# Patient Record
Sex: FEMALE | Employment: OTHER | URBAN - METROPOLITAN AREA
[De-identification: names, ages, dates, MRNs, and addresses within clinical notes are randomized per-mention and may not be internally consistent; named-entity substitution may affect disease eponyms.]

---

## 2017-02-04 ENCOUNTER — OFFICE VISIT (OUTPATIENT)
Dept: INTERNAL MEDICINE CLINIC | Age: 52
End: 2017-02-04

## 2017-02-04 VITALS
HEART RATE: 82 BPM | OXYGEN SATURATION: 98 % | WEIGHT: 259 LBS | SYSTOLIC BLOOD PRESSURE: 118 MMHG | DIASTOLIC BLOOD PRESSURE: 98 MMHG | HEIGHT: 67 IN | BODY MASS INDEX: 40.65 KG/M2

## 2017-02-04 DIAGNOSIS — M25.50 ARTHRALGIA, UNSPECIFIED JOINT: ICD-10-CM

## 2017-02-04 DIAGNOSIS — Z77.011 LEAD EXPOSURE: ICD-10-CM

## 2017-02-04 DIAGNOSIS — Z13.9 SCREENING: ICD-10-CM

## 2017-02-04 DIAGNOSIS — E78.00 PURE HYPERCHOLESTEROLEMIA: ICD-10-CM

## 2017-02-04 LAB
A/G RATIO: 1.5 (ref 1.1–2.2)
ALBUMIN SERPL-MCNC: 4.3 G/DL (ref 3.4–5)
ALP BLD-CCNC: 67 U/L (ref 40–129)
ALT SERPL-CCNC: 15 U/L (ref 10–40)
ANION GAP SERPL CALCULATED.3IONS-SCNC: 11 MMOL/L (ref 3–16)
AST SERPL-CCNC: 11 U/L (ref 15–37)
BILIRUB SERPL-MCNC: 0.3 MG/DL (ref 0–1)
BILIRUBIN URINE: NEGATIVE
BLOOD, URINE: ABNORMAL
BUN BLDV-MCNC: 10 MG/DL (ref 7–20)
CALCIUM SERPL-MCNC: 10 MG/DL (ref 8.3–10.6)
CHLORIDE BLD-SCNC: 101 MMOL/L (ref 99–110)
CHOLESTEROL, TOTAL: 221 MG/DL (ref 0–199)
CLARITY: CLEAR
CO2: 28 MMOL/L (ref 21–32)
COLOR: YELLOW
CREAT SERPL-MCNC: 0.7 MG/DL (ref 0.6–1.1)
CREATININE URINE: 191.4 MG/DL (ref 28–259)
EPITHELIAL CELLS, UA: 0 /HPF (ref 0–5)
GFR AFRICAN AMERICAN: >60
GFR NON-AFRICAN AMERICAN: >60
GLOBULIN: 2.8 G/DL
GLUCOSE BLD-MCNC: 93 MG/DL (ref 70–99)
GLUCOSE URINE: NEGATIVE MG/DL
HCT VFR BLD CALC: 43.6 % (ref 36–48)
HDLC SERPL-MCNC: 54 MG/DL (ref 40–60)
HEMOGLOBIN: 14.8 G/DL (ref 12–16)
HYALINE CASTS: 1 /HPF (ref 0–8)
KETONES, URINE: NEGATIVE MG/DL
LDL CHOLESTEROL CALCULATED: 153 MG/DL
LEUKOCYTE ESTERASE, URINE: NEGATIVE
MCH RBC QN AUTO: 31.7 PG (ref 26–34)
MCHC RBC AUTO-ENTMCNC: 33.9 G/DL (ref 31–36)
MCV RBC AUTO: 93.6 FL (ref 80–100)
MICROALBUMIN UR-MCNC: <1.2 MG/DL
MICROALBUMIN/CREAT UR-RTO: NORMAL MG/G (ref 0–30)
MICROSCOPIC EXAMINATION: YES
NITRITE, URINE: NEGATIVE
PDW BLD-RTO: 12.9 % (ref 12.4–15.4)
PH UA: 6.5
PLATELET # BLD: 193 K/UL (ref 135–450)
PMV BLD AUTO: 9.6 FL (ref 5–10.5)
POTASSIUM SERPL-SCNC: 4.2 MMOL/L (ref 3.5–5.1)
PROTEIN UA: NEGATIVE MG/DL
RBC # BLD: 4.66 M/UL (ref 4–5.2)
RBC UA: 44 /HPF (ref 0–4)
SODIUM BLD-SCNC: 140 MMOL/L (ref 136–145)
SPECIFIC GRAVITY UA: 1.02
TOTAL PROTEIN: 7.1 G/DL (ref 6.4–8.2)
TRIGL SERPL-MCNC: 68 MG/DL (ref 0–150)
TSH REFLEX: 1.4 UIU/ML (ref 0.27–4.2)
URINE TYPE: ABNORMAL
UROBILINOGEN, URINE: 0.2 E.U./DL
VLDLC SERPL CALC-MCNC: 14 MG/DL
WBC # BLD: 3 K/UL (ref 4–11)
WBC UA: 1 /HPF (ref 0–5)

## 2017-02-04 PROCEDURE — G8419 CALC BMI OUT NRM PARAM NOF/U: HCPCS | Performed by: INTERNAL MEDICINE

## 2017-02-04 PROCEDURE — G8484 FLU IMMUNIZE NO ADMIN: HCPCS | Performed by: INTERNAL MEDICINE

## 2017-02-04 PROCEDURE — 99214 OFFICE O/P EST MOD 30 MIN: CPT | Performed by: INTERNAL MEDICINE

## 2017-02-04 PROCEDURE — 3014F SCREEN MAMMO DOC REV: CPT | Performed by: INTERNAL MEDICINE

## 2017-02-04 PROCEDURE — G8427 DOCREV CUR MEDS BY ELIG CLIN: HCPCS | Performed by: INTERNAL MEDICINE

## 2017-02-04 PROCEDURE — 1036F TOBACCO NON-USER: CPT | Performed by: INTERNAL MEDICINE

## 2017-02-12 ASSESSMENT — ENCOUNTER SYMPTOMS
RESPIRATORY NEGATIVE: 1
GASTROINTESTINAL NEGATIVE: 1
EYES NEGATIVE: 1

## 2017-05-10 ENCOUNTER — TELEPHONE (OUTPATIENT)
Dept: INTERNAL MEDICINE CLINIC | Age: 52
End: 2017-05-10

## 2017-07-12 ENCOUNTER — OFFICE VISIT (OUTPATIENT)
Dept: INTERNAL MEDICINE CLINIC | Age: 52
End: 2017-07-12

## 2017-07-12 VITALS
HEART RATE: 74 BPM | BODY MASS INDEX: 41.35 KG/M2 | WEIGHT: 264 LBS | SYSTOLIC BLOOD PRESSURE: 124 MMHG | OXYGEN SATURATION: 98 % | DIASTOLIC BLOOD PRESSURE: 94 MMHG

## 2017-07-12 DIAGNOSIS — I83.93 VARICOSE VEINS OF BOTH LOWER EXTREMITIES: ICD-10-CM

## 2017-07-12 DIAGNOSIS — I10 ESSENTIAL HYPERTENSION: Primary | ICD-10-CM

## 2017-07-12 DIAGNOSIS — N63.0 BREAST LUMP: ICD-10-CM

## 2017-07-12 PROCEDURE — 99214 OFFICE O/P EST MOD 30 MIN: CPT | Performed by: INTERNAL MEDICINE

## 2017-07-12 RX ORDER — AMLODIPINE BESYLATE 2.5 MG/1
2.5 TABLET ORAL DAILY
Qty: 30 TABLET | Refills: 3 | Status: SHIPPED | OUTPATIENT
Start: 2017-07-12 | End: 2018-02-06 | Stop reason: ALTCHOICE

## 2017-07-25 ENCOUNTER — OFFICE VISIT (OUTPATIENT)
Dept: SURGERY | Age: 52
End: 2017-07-25

## 2017-07-25 ENCOUNTER — HOSPITAL ENCOUNTER (OUTPATIENT)
Dept: MAMMOGRAPHY | Age: 52
Discharge: HOME | End: 2017-07-25
Attending: SURGERY | Admitting: SURGERY

## 2017-07-25 VITALS
WEIGHT: 260 LBS | HEART RATE: 73 BPM | DIASTOLIC BLOOD PRESSURE: 91 MMHG | BODY MASS INDEX: 40.81 KG/M2 | SYSTOLIC BLOOD PRESSURE: 130 MMHG | HEIGHT: 67 IN

## 2017-07-25 DIAGNOSIS — L91.0 KELOID SCAR: ICD-10-CM

## 2017-07-25 DIAGNOSIS — R92.8 ABNORMAL FINDING ON MAMMOGRAPHY: ICD-10-CM

## 2017-07-25 DIAGNOSIS — L91.0 KELOID SCAR: Primary | ICD-10-CM

## 2017-07-25 DIAGNOSIS — Z12.31 VISIT FOR SCREENING MAMMOGRAM: ICD-10-CM

## 2017-07-25 PROCEDURE — 99244 OFF/OP CNSLTJ NEW/EST MOD 40: CPT | Performed by: SURGERY

## 2017-07-25 ASSESSMENT — ENCOUNTER SYMPTOMS
RESPIRATORY NEGATIVE: 1
GASTROINTESTINAL NEGATIVE: 1
EYES NEGATIVE: 1

## 2017-09-15 ENCOUNTER — OFFICE VISIT (OUTPATIENT)
Dept: INTERNAL MEDICINE CLINIC | Age: 52
End: 2017-09-15

## 2017-09-15 VITALS
HEIGHT: 67 IN | DIASTOLIC BLOOD PRESSURE: 82 MMHG | WEIGHT: 257.6 LBS | HEART RATE: 60 BPM | BODY MASS INDEX: 40.43 KG/M2 | SYSTOLIC BLOOD PRESSURE: 120 MMHG | TEMPERATURE: 98.4 F

## 2017-09-15 DIAGNOSIS — M54.2 NECK PAIN: ICD-10-CM

## 2017-09-15 DIAGNOSIS — I10 ESSENTIAL HYPERTENSION: ICD-10-CM

## 2017-09-15 DIAGNOSIS — R51.9 ACUTE NONINTRACTABLE HEADACHE, UNSPECIFIED HEADACHE TYPE: Primary | ICD-10-CM

## 2017-09-15 PROCEDURE — 99213 OFFICE O/P EST LOW 20 MIN: CPT | Performed by: NURSE PRACTITIONER

## 2017-09-15 RX ORDER — ECHINACEA PURPUREA EXTRACT 125 MG
1 TABLET ORAL PRN
Qty: 1 BOTTLE | Refills: 3 | Status: SHIPPED | OUTPATIENT
Start: 2017-09-15

## 2017-09-15 RX ORDER — LORATADINE AND PSEUDOEPHEDRINE 10; 240 MG/1; MG/1
1 TABLET, EXTENDED RELEASE ORAL DAILY
Qty: 10 TABLET | Refills: 0 | Status: ON HOLD | OUTPATIENT
Start: 2017-09-15 | End: 2019-12-03

## 2017-09-15 ASSESSMENT — PATIENT HEALTH QUESTIONNAIRE - PHQ9
SUM OF ALL RESPONSES TO PHQ9 QUESTIONS 1 & 2: 0
2. FEELING DOWN, DEPRESSED OR HOPELESS: 0
1. LITTLE INTEREST OR PLEASURE IN DOING THINGS: 0
SUM OF ALL RESPONSES TO PHQ QUESTIONS 1-9: 0

## 2017-09-15 ASSESSMENT — ENCOUNTER SYMPTOMS
PHOTOPHOBIA: 0
RHINORRHEA: 0
APNEA: 0
VOICE CHANGE: 0
SINUS PRESSURE: 1
CHEST TIGHTNESS: 0
GASTROINTESTINAL NEGATIVE: 1
SORE THROAT: 0
TROUBLE SWALLOWING: 0
SHORTNESS OF BREATH: 0

## 2017-09-18 ENCOUNTER — TELEPHONE (OUTPATIENT)
Dept: INTERNAL MEDICINE CLINIC | Age: 52
End: 2017-09-18

## 2017-10-18 ENCOUNTER — OFFICE VISIT (OUTPATIENT)
Dept: INTERNAL MEDICINE CLINIC | Age: 52
End: 2017-10-18

## 2017-10-18 VITALS
TEMPERATURE: 98.2 F | HEIGHT: 68 IN | SYSTOLIC BLOOD PRESSURE: 118 MMHG | DIASTOLIC BLOOD PRESSURE: 78 MMHG | HEART RATE: 78 BPM | RESPIRATION RATE: 12 BRPM | OXYGEN SATURATION: 97 % | BODY MASS INDEX: 40.25 KG/M2 | WEIGHT: 265.6 LBS

## 2017-10-18 DIAGNOSIS — I10 ESSENTIAL HYPERTENSION: Primary | ICD-10-CM

## 2017-10-18 DIAGNOSIS — G47.9 SLEEP DISORDER: ICD-10-CM

## 2017-10-18 DIAGNOSIS — B34.9 VIRAL SYNDROME: ICD-10-CM

## 2017-10-18 PROCEDURE — 1036F TOBACCO NON-USER: CPT | Performed by: INTERNAL MEDICINE

## 2017-10-18 PROCEDURE — G8417 CALC BMI ABV UP PARAM F/U: HCPCS | Performed by: INTERNAL MEDICINE

## 2017-10-18 PROCEDURE — 3014F SCREEN MAMMO DOC REV: CPT | Performed by: INTERNAL MEDICINE

## 2017-10-18 PROCEDURE — G8427 DOCREV CUR MEDS BY ELIG CLIN: HCPCS | Performed by: INTERNAL MEDICINE

## 2017-10-18 PROCEDURE — G8484 FLU IMMUNIZE NO ADMIN: HCPCS | Performed by: INTERNAL MEDICINE

## 2017-10-18 PROCEDURE — 3017F COLORECTAL CA SCREEN DOC REV: CPT | Performed by: INTERNAL MEDICINE

## 2017-10-18 PROCEDURE — 99214 OFFICE O/P EST MOD 30 MIN: CPT | Performed by: INTERNAL MEDICINE

## 2017-10-22 ASSESSMENT — ENCOUNTER SYMPTOMS
EYES NEGATIVE: 1
GASTROINTESTINAL NEGATIVE: 1

## 2017-12-20 ENCOUNTER — OFFICE VISIT (OUTPATIENT)
Dept: INTERNAL MEDICINE CLINIC | Age: 52
End: 2017-12-20

## 2017-12-20 VITALS
SYSTOLIC BLOOD PRESSURE: 124 MMHG | HEART RATE: 75 BPM | RESPIRATION RATE: 16 BRPM | OXYGEN SATURATION: 97 % | BODY MASS INDEX: 40.28 KG/M2 | DIASTOLIC BLOOD PRESSURE: 60 MMHG | WEIGHT: 261 LBS

## 2017-12-20 DIAGNOSIS — H02.9 EYELID LESION: ICD-10-CM

## 2017-12-20 DIAGNOSIS — I10 ESSENTIAL HYPERTENSION: ICD-10-CM

## 2017-12-20 PROCEDURE — 99214 OFFICE O/P EST MOD 30 MIN: CPT | Performed by: INTERNAL MEDICINE

## 2017-12-20 PROCEDURE — G8427 DOCREV CUR MEDS BY ELIG CLIN: HCPCS | Performed by: INTERNAL MEDICINE

## 2017-12-20 PROCEDURE — 3017F COLORECTAL CA SCREEN DOC REV: CPT | Performed by: INTERNAL MEDICINE

## 2017-12-20 PROCEDURE — 3014F SCREEN MAMMO DOC REV: CPT | Performed by: INTERNAL MEDICINE

## 2017-12-20 PROCEDURE — 1036F TOBACCO NON-USER: CPT | Performed by: INTERNAL MEDICINE

## 2017-12-20 PROCEDURE — G8484 FLU IMMUNIZE NO ADMIN: HCPCS | Performed by: INTERNAL MEDICINE

## 2017-12-20 PROCEDURE — G8417 CALC BMI ABV UP PARAM F/U: HCPCS | Performed by: INTERNAL MEDICINE

## 2017-12-20 NOTE — PROGRESS NOTES
physical activity discussed. 3. Eyelid lesion  Amb External Referral To Ophthalmology                   Plan:    See plans above.

## 2017-12-25 ASSESSMENT — ENCOUNTER SYMPTOMS
EYES NEGATIVE: 1
GASTROINTESTINAL NEGATIVE: 1

## 2018-02-06 ENCOUNTER — HOSPITAL ENCOUNTER (OUTPATIENT)
Dept: ENDOSCOPY | Age: 53
Discharge: HOME | End: 2018-02-06
Attending: INTERNAL MEDICINE | Admitting: INTERNAL MEDICINE

## 2018-02-06 VITALS
TEMPERATURE: 98 F | DIASTOLIC BLOOD PRESSURE: 70 MMHG | RESPIRATION RATE: 18 BRPM | OXYGEN SATURATION: 100 % | WEIGHT: 263 LBS | BODY MASS INDEX: 39.86 KG/M2 | HEIGHT: 68 IN | SYSTOLIC BLOOD PRESSURE: 144 MMHG | HEART RATE: 90 BPM

## 2018-02-06 LAB — PREGNANCY, URINE: NEGATIVE

## 2018-02-06 ASSESSMENT — PAIN - FUNCTIONAL ASSESSMENT: PAIN_FUNCTIONAL_ASSESSMENT: 0-10

## 2018-02-06 NOTE — OP NOTE
after midnight, as ordered [x] Verify NPO status [x] IV fluids as support [x] Clear liquids and/or ice chips as ordered  [x] Tolerating clear liquids  [x] Special diet as ordered  [x] D/C IV fluids   ACTIVITY  [x] Assess level of function  [x]  Specified by physician  [x]  Activity as tolerated [x] Position on left side [x] Position on left side and reposition patient as physician ordered [x] Gradually elevate HOB to popes position  [x] Position changes as patient tolerated  [x] Ambulate as pre-procedure   PATIENT / S.O. EDUCATION [x] Pre, Intra Post-procedure  teaching appropriate to procedure  [x] Conscious Sedation Teaching  [x] Pain Management - instructed [x] Encourage questions  [x] Clarify any concerns [x] Safety devices maintain to  prevent patient injury  [x] Assist and support patient  [x] Observe standard precautions [x] Physician confers with the family/S.O. [x] Short visit from family in [place] area  [x] Physician specific post-procedure orders  [x] S/S complications with proper [x]F/U; office visits F/U  [x] Review discharge instructions, medicine to family /S. O. [x] Medication/ special diet information given to family/ S.O. [x]  Copy of discharge instructions givn to family/S.O.   OUTCOME PLANNING  DISCHARGE PLAN [x] Patient/S. O. will verbalize understanding of admission procedure & expectations of outcome in realistic terms   [x] Patient verbalize the role of family/S. O. in plan of care  [x] Patient will have designated responsible person available for discharge.  [x] Patient will demonstrate an  understanding of the planned  procedure and its related procedures and conscious sedation [x] Patient will:  - receive services according to the           standards of care  - receive standards for conscious       sedation  - remain free of injury [x] Patient will:  - have stable vital signs based on Garland Score   - be pain free or tolerable, have no bleeding  - have minimal abdominal distention   - return to pre-procedure level of orientation   -  tolerate fluid with no nausea and vomiting  -  ambulate as pre-procedure ADL   - verbalize understanding of the discharge instructions     Electronically signed by Juan Shrestha RN on 2/6/2018 at 10:15 AM

## 2018-02-06 NOTE — H&P
History and Physical / Pre-Sedation Assessment    Patient:  Florence Bartlett   :   1965     Intended Procedure:  Colonoscopy and egd    HPI: colon cancer screening. GERD, r/o amin. dyspepsia    Nurses notes reviewed and agreed. Medications reviewed  Allergies: No Known Allergies    Physical Exam:  Vital Signs: BP (!) 144/70   Pulse 90   Temp 98 °F (36.7 °C)   Resp 18   Ht 5' 7.5\" (1.715 m)   Wt 263 lb (119.3 kg)   SpO2 100%   BMI 40.58 kg/m²    Pulmonary:Normal  Cardiac:Normal  Abdomen:Normal    Pre-Procedure Assessment / Plan:  ASA 2 - Patient with mild systemic disease with no functional limitations  Level of Sedation Plan: Moderate sedation  Post Procedure plan: Return to same level of care    I assessed the patient and find that the patient is in satisfactory condition to proceed with the planned procedure and sedation plan. I have explained the risk, benefits, and alternatives to the procedure. The patient understands and agrees to proceed.   Adilson Lackey  12:57 PM 2018

## 2018-02-07 NOTE — OP NOTE
[address], [address]                                 OPERATIVE REPORT    PATIENT NAME: Adan Loving             :        1965  MED REC NO:   [id number]                          ROOM:  ACCOUNT NO:   [de-identified]                          ADMIT DATE: 2018  PROVIDER:     Musa Cota MD    ADDENDUM    DATE OF PROCEDURE:  2018    SURGEON:  Musa Cota MD    A small polyp noted in the rectum, which was removed with the biopsy  forceps technique.         Bronson Manning MD    D: 2018 13:06:51       T: 2018 23:49:46     [email or web address]  Job#: [id number]     Doc#: [id number]    CC:

## 2018-03-06 ENCOUNTER — HOSPITAL ENCOUNTER (OUTPATIENT)
Dept: ULTRASOUND IMAGING | Age: 53
Discharge: HOME | End: 2018-03-06
Attending: INTERNAL MEDICINE | Admitting: INTERNAL MEDICINE

## 2018-03-06 DIAGNOSIS — R10.30 LOWER ABDOMINAL PAIN: ICD-10-CM

## 2018-04-12 ENCOUNTER — OFFICE VISIT (OUTPATIENT)
Dept: INTERNAL MEDICINE CLINIC | Age: 53
End: 2018-04-12

## 2018-04-12 VITALS
OXYGEN SATURATION: 98 % | HEIGHT: 68 IN | WEIGHT: 262.2 LBS | BODY MASS INDEX: 39.74 KG/M2 | HEART RATE: 85 BPM | SYSTOLIC BLOOD PRESSURE: 124 MMHG | DIASTOLIC BLOOD PRESSURE: 74 MMHG

## 2018-04-12 DIAGNOSIS — E55.9 VITAMIN D DEFICIENCY: ICD-10-CM

## 2018-04-12 DIAGNOSIS — D50.8 OTHER IRON DEFICIENCY ANEMIA: ICD-10-CM

## 2018-04-12 DIAGNOSIS — I10 ESSENTIAL HYPERTENSION: ICD-10-CM

## 2018-04-12 LAB
HCT VFR BLD CALC: 43.1 % (ref 36–48)
HEMOGLOBIN: 14.6 G/DL (ref 12–16)
MCH RBC QN AUTO: 31.6 PG (ref 26–34)
MCHC RBC AUTO-ENTMCNC: 33.8 G/DL (ref 31–36)
MCV RBC AUTO: 93.3 FL (ref 80–100)
PDW BLD-RTO: 13.6 % (ref 12.4–15.4)
PLATELET # BLD: 176 K/UL (ref 135–450)
PMV BLD AUTO: 9.7 FL (ref 5–10.5)
RBC # BLD: 4.62 M/UL (ref 4–5.2)
WBC # BLD: 2.7 K/UL (ref 4–11)

## 2018-04-12 PROCEDURE — G8427 DOCREV CUR MEDS BY ELIG CLIN: HCPCS | Performed by: INTERNAL MEDICINE

## 2018-04-12 PROCEDURE — G8417 CALC BMI ABV UP PARAM F/U: HCPCS | Performed by: INTERNAL MEDICINE

## 2018-04-12 PROCEDURE — 99214 OFFICE O/P EST MOD 30 MIN: CPT | Performed by: INTERNAL MEDICINE

## 2018-04-12 PROCEDURE — 3014F SCREEN MAMMO DOC REV: CPT | Performed by: INTERNAL MEDICINE

## 2018-04-12 PROCEDURE — 3017F COLORECTAL CA SCREEN DOC REV: CPT | Performed by: INTERNAL MEDICINE

## 2018-04-12 PROCEDURE — 1036F TOBACCO NON-USER: CPT | Performed by: INTERNAL MEDICINE

## 2018-04-12 RX ORDER — AMLODIPINE BESYLATE 2.5 MG/1
2.5 TABLET ORAL DAILY
Qty: 30 TABLET | Refills: 3 | Status: SHIPPED | OUTPATIENT
Start: 2018-04-12 | End: 2020-07-05

## 2018-04-13 LAB
A/G RATIO: 1.3 (ref 1.1–2.2)
ALBUMIN SERPL-MCNC: 4 G/DL (ref 3.4–5)
ALP BLD-CCNC: 59 U/L (ref 40–129)
ALT SERPL-CCNC: 18 U/L (ref 10–40)
ANION GAP SERPL CALCULATED.3IONS-SCNC: 11 MMOL/L (ref 3–16)
AST SERPL-CCNC: 16 U/L (ref 15–37)
BILIRUB SERPL-MCNC: 0.3 MG/DL (ref 0–1)
BUN BLDV-MCNC: 11 MG/DL (ref 7–20)
CALCIUM SERPL-MCNC: 9.4 MG/DL (ref 8.3–10.6)
CHLORIDE BLD-SCNC: 102 MMOL/L (ref 99–110)
CHOLESTEROL, TOTAL: 195 MG/DL (ref 0–199)
CO2: 25 MMOL/L (ref 21–32)
CREAT SERPL-MCNC: 0.6 MG/DL (ref 0.6–1.1)
GFR AFRICAN AMERICAN: >60
GFR NON-AFRICAN AMERICAN: >60
GLOBULIN: 3.2 G/DL
GLUCOSE BLD-MCNC: 81 MG/DL (ref 70–99)
HDLC SERPL-MCNC: 56 MG/DL (ref 40–60)
LDL CHOLESTEROL CALCULATED: 130 MG/DL
POTASSIUM SERPL-SCNC: 4.8 MMOL/L (ref 3.5–5.1)
SODIUM BLD-SCNC: 138 MMOL/L (ref 136–145)
TOTAL PROTEIN: 7.2 G/DL (ref 6.4–8.2)
TRIGL SERPL-MCNC: 47 MG/DL (ref 0–150)
TSH REFLEX: 0.94 UIU/ML (ref 0.27–4.2)
VITAMIN D 25-HYDROXY: 41.8 NG/ML
VLDLC SERPL CALC-MCNC: 9 MG/DL

## 2018-04-14 ASSESSMENT — ENCOUNTER SYMPTOMS
EYES NEGATIVE: 1
GASTROINTESTINAL NEGATIVE: 1

## 2018-10-24 ENCOUNTER — OFFICE VISIT (OUTPATIENT)
Dept: INTERNAL MEDICINE CLINIC | Age: 53
End: 2018-10-24
Payer: COMMERCIAL

## 2018-10-24 VITALS
HEART RATE: 72 BPM | SYSTOLIC BLOOD PRESSURE: 126 MMHG | HEIGHT: 67 IN | DIASTOLIC BLOOD PRESSURE: 72 MMHG | WEIGHT: 257.2 LBS | BODY MASS INDEX: 40.37 KG/M2 | OXYGEN SATURATION: 99 %

## 2018-10-24 DIAGNOSIS — Z77.011 LEAD EXPOSURE: ICD-10-CM

## 2018-10-24 DIAGNOSIS — I10 ESSENTIAL HYPERTENSION: Primary | ICD-10-CM

## 2018-10-24 DIAGNOSIS — M77.32 HEEL SPUR, LEFT: ICD-10-CM

## 2018-10-24 DIAGNOSIS — R73.9 HYPERGLYCEMIA: ICD-10-CM

## 2018-10-24 PROCEDURE — 99214 OFFICE O/P EST MOD 30 MIN: CPT | Performed by: INTERNAL MEDICINE

## 2018-10-24 PROCEDURE — 83036 HEMOGLOBIN GLYCOSYLATED A1C: CPT | Performed by: INTERNAL MEDICINE

## 2018-10-24 ASSESSMENT — PATIENT HEALTH QUESTIONNAIRE - PHQ9
SUM OF ALL RESPONSES TO PHQ QUESTIONS 1-9: 0
2. FEELING DOWN, DEPRESSED OR HOPELESS: 0
SUM OF ALL RESPONSES TO PHQ9 QUESTIONS 1 & 2: 0
1. LITTLE INTEREST OR PLEASURE IN DOING THINGS: 0
SUM OF ALL RESPONSES TO PHQ QUESTIONS 1-9: 0

## 2018-10-28 LAB — LEAD LEVEL BLOOD: <2 UG/DL (ref 0–4.9)

## 2018-11-05 ASSESSMENT — ENCOUNTER SYMPTOMS
RESPIRATORY NEGATIVE: 1
GASTROINTESTINAL NEGATIVE: 1
EYES NEGATIVE: 1

## 2018-11-08 ENCOUNTER — OFFICE VISIT (OUTPATIENT)
Dept: ORTHOPEDIC SURGERY | Age: 53
End: 2018-11-08
Payer: COMMERCIAL

## 2018-11-08 VITALS
SYSTOLIC BLOOD PRESSURE: 135 MMHG | WEIGHT: 257.28 LBS | HEART RATE: 77 BPM | HEIGHT: 67 IN | DIASTOLIC BLOOD PRESSURE: 74 MMHG | BODY MASS INDEX: 40.38 KG/M2

## 2018-11-08 DIAGNOSIS — M72.2 PLANTAR FASCIITIS OF RIGHT FOOT: ICD-10-CM

## 2018-11-08 DIAGNOSIS — M79.671 PAIN OF RIGHT HEEL: Primary | ICD-10-CM

## 2018-11-08 PROCEDURE — L3040 FT ARCH SUPRT PREMOLD LONGIT: HCPCS | Performed by: PODIATRIST

## 2018-11-08 PROCEDURE — G8427 DOCREV CUR MEDS BY ELIG CLIN: HCPCS | Performed by: PODIATRIST

## 2018-11-08 PROCEDURE — 99203 OFFICE O/P NEW LOW 30 MIN: CPT | Performed by: PODIATRIST

## 2018-11-08 PROCEDURE — G8417 CALC BMI ABV UP PARAM F/U: HCPCS | Performed by: PODIATRIST

## 2018-11-08 PROCEDURE — G8484 FLU IMMUNIZE NO ADMIN: HCPCS | Performed by: PODIATRIST

## 2018-11-08 PROCEDURE — 1036F TOBACCO NON-USER: CPT | Performed by: PODIATRIST

## 2018-11-08 PROCEDURE — 3017F COLORECTAL CA SCREEN DOC REV: CPT | Performed by: PODIATRIST

## 2018-11-08 RX ORDER — PREDNISONE 10 MG/1
TABLET ORAL
Qty: 26 TABLET | Refills: 0 | Status: SHIPPED | OUTPATIENT
Start: 2018-11-08 | End: 2019-11-22

## 2018-11-08 RX ORDER — MULTIVIT WITH MINERALS/LUTEIN
250 TABLET ORAL
COMMUNITY
End: 2019-03-14

## 2018-11-08 NOTE — PROGRESS NOTES
HISTORY OF PRESENT ILLNESS: This is an initial visit for a 28-year-old female with a chief complaint of right plantar heel pain. She's had pain for the past couple of months. There is no history of trauma. The pain is worsened with activity and relieved by rest.  Pain is often present in the mornings and after short periods of     inactivity. Initially, after movement, the pain is sharp but seems to resolve for a short time with activity, only to return with prolonged activity. The patient denies any shooting pain to the toes or on the bottom of the foot. No treatment has been tried other than rest     and occasional anti-inflammatories. FAMILY HISTORY: Documented in chart. SOCIAL HISTORY: Documented in chart. REVIEW OF SYSTEMS: The patient denies any problems with cardiovascular, pulmonary, gastrointestinal, neurologic, urologic, genitourinary, psychiatric, dermatologic, and HEENT systems. Family History, Social History, and Review of Systems were reviewed from patient history form dated on 11/8/2018 and available in the patient's chart under the MEDIA tab. PHYSICAL EXAMINATION:  The patient is alert and orientated x3. The area of greatest palpable tenderness is at the plantar medial aspect of the right heel. There is no pain with side-to-side compression of the heel. There are no paresthesias elicited at the tibial nerve, over the tarsal tunnel. Sensation is otherwise grossly intact. There is no erythema, ecchymosis, or edema noted. Palpable pedal pulses are present. The remainder of the exam is unremarkable. RADIOGRAPHS:Two weightbearing views of the right heel were taken. X-rays demonstrate no signs of stress fracture in the right heel. No acute fractures or periosteal reactions are noted. ASSESSMENT: Plantar fasciitis and Tenosynovitis of the foot, right foot, with right foot pain. PLAN: The patient was educated on the pathology and its treatment options.      The

## 2018-12-13 ENCOUNTER — OFFICE VISIT (OUTPATIENT)
Dept: ORTHOPEDIC SURGERY | Age: 53
End: 2018-12-13
Payer: COMMERCIAL

## 2018-12-13 VITALS
BODY MASS INDEX: 40.38 KG/M2 | DIASTOLIC BLOOD PRESSURE: 82 MMHG | HEIGHT: 67 IN | HEART RATE: 78 BPM | SYSTOLIC BLOOD PRESSURE: 133 MMHG | WEIGHT: 257.28 LBS

## 2018-12-13 DIAGNOSIS — M72.2 PLANTAR FASCIITIS OF RIGHT FOOT: Primary | ICD-10-CM

## 2018-12-13 PROCEDURE — G8417 CALC BMI ABV UP PARAM F/U: HCPCS | Performed by: PODIATRIST

## 2018-12-13 PROCEDURE — 99213 OFFICE O/P EST LOW 20 MIN: CPT | Performed by: PODIATRIST

## 2018-12-13 PROCEDURE — 1036F TOBACCO NON-USER: CPT | Performed by: PODIATRIST

## 2018-12-13 PROCEDURE — G8427 DOCREV CUR MEDS BY ELIG CLIN: HCPCS | Performed by: PODIATRIST

## 2018-12-13 PROCEDURE — G8484 FLU IMMUNIZE NO ADMIN: HCPCS | Performed by: PODIATRIST

## 2018-12-13 PROCEDURE — 3017F COLORECTAL CA SCREEN DOC REV: CPT | Performed by: PODIATRIST

## 2019-03-14 ENCOUNTER — OFFICE VISIT (OUTPATIENT)
Dept: INTERNAL MEDICINE CLINIC | Age: 54
End: 2019-03-14
Payer: COMMERCIAL

## 2019-03-14 VITALS
SYSTOLIC BLOOD PRESSURE: 132 MMHG | DIASTOLIC BLOOD PRESSURE: 78 MMHG | WEIGHT: 262 LBS | BODY MASS INDEX: 41.12 KG/M2 | OXYGEN SATURATION: 97 % | HEART RATE: 85 BPM | HEIGHT: 67 IN

## 2019-03-14 DIAGNOSIS — I10 ESSENTIAL HYPERTENSION: ICD-10-CM

## 2019-03-14 DIAGNOSIS — J11.1 FLU: Primary | ICD-10-CM

## 2019-03-14 PROCEDURE — 3017F COLORECTAL CA SCREEN DOC REV: CPT | Performed by: INTERNAL MEDICINE

## 2019-03-14 PROCEDURE — 1036F TOBACCO NON-USER: CPT | Performed by: INTERNAL MEDICINE

## 2019-03-14 PROCEDURE — 99214 OFFICE O/P EST MOD 30 MIN: CPT | Performed by: INTERNAL MEDICINE

## 2019-03-14 PROCEDURE — G8417 CALC BMI ABV UP PARAM F/U: HCPCS | Performed by: INTERNAL MEDICINE

## 2019-03-14 PROCEDURE — G8427 DOCREV CUR MEDS BY ELIG CLIN: HCPCS | Performed by: INTERNAL MEDICINE

## 2019-03-14 PROCEDURE — G8484 FLU IMMUNIZE NO ADMIN: HCPCS | Performed by: INTERNAL MEDICINE

## 2019-03-14 ASSESSMENT — PATIENT HEALTH QUESTIONNAIRE - PHQ9
2. FEELING DOWN, DEPRESSED OR HOPELESS: 0
SUM OF ALL RESPONSES TO PHQ QUESTIONS 1-9: 0
1. LITTLE INTEREST OR PLEASURE IN DOING THINGS: 0
SUM OF ALL RESPONSES TO PHQ QUESTIONS 1-9: 0
SUM OF ALL RESPONSES TO PHQ9 QUESTIONS 1 & 2: 0

## 2019-03-18 ASSESSMENT — ENCOUNTER SYMPTOMS
EYES NEGATIVE: 1
GASTROINTESTINAL NEGATIVE: 1

## 2019-07-17 ENCOUNTER — OFFICE VISIT (OUTPATIENT)
Dept: INTERNAL MEDICINE CLINIC | Age: 54
End: 2019-07-17
Payer: COMMERCIAL

## 2019-07-17 VITALS
HEART RATE: 75 BPM | TEMPERATURE: 98.2 F | WEIGHT: 260.8 LBS | DIASTOLIC BLOOD PRESSURE: 85 MMHG | HEIGHT: 68 IN | BODY MASS INDEX: 39.53 KG/M2 | OXYGEN SATURATION: 98 % | SYSTOLIC BLOOD PRESSURE: 127 MMHG | RESPIRATION RATE: 18 BRPM

## 2019-07-17 DIAGNOSIS — R10.30 LOWER ABDOMINAL PAIN: Primary | ICD-10-CM

## 2019-07-17 LAB
BILIRUBIN URINE: NEGATIVE MG/DL
BLOOD, URINE: ABNORMAL
CLARITY: ABNORMAL
COLOR: ABNORMAL
GLUCOSE URINE: NEGATIVE MG/DL
KETONES, URINE: NEGATIVE MG/DL
LEUKOCYTE ESTERASE, URINE: NEGATIVE
MICROSCOPIC EXAMINATION: YES
NITRITE, URINE: NEGATIVE
PH UA: 6 (ref 5–8)
PREGNANCY, URINE: NEGATIVE
PROTEIN UA: ABNORMAL MG/DL
SPECIFIC GRAVITY UA: 1.02 (ref 1–1.03)
UROBILINOGEN, URINE: 1 E.U./DL

## 2019-07-17 PROCEDURE — 81003 URINALYSIS AUTO W/O SCOPE: CPT

## 2019-07-17 PROCEDURE — 99213 OFFICE O/P EST LOW 20 MIN: CPT | Performed by: STUDENT IN AN ORGANIZED HEALTH CARE EDUCATION/TRAINING PROGRAM

## 2019-07-17 PROCEDURE — 84703 CHORIONIC GONADOTROPIN ASSAY: CPT

## 2019-07-17 NOTE — PROGRESS NOTES
Outpatient Clinic Visit Note    Patient: Kurtis Adan  : 1965 (47 y.o.)  MRN: [id number]  Date: 2019    CC:  Abdominal pain    HPI:    The patient is a 47 y.o. female who presents with abdominal pain. Pt states that she had onset of abdominal pain that stated yesterday. The pain worsened today upon waking. The pain is in the LLQ and radiates to the L flank. She woke up with 10/10 abd pain that was progressively improving during the course of the day to a 5/10 however the pain increased when ambulating. It is associated with nausea and loose bowel movement but non emesis. No blood or mucus in diarrhea. She has not tried any anelgesics for pain. The pain is similar to when she had fibroid pains which was diagnosed 6 years ago. She did state that she has had a 3.5cm L ovary cyst. She did indicate she has been having irregular periods over last year, every few months. Last period 4 mo ago. Pain more severe than prior menstrual cramps. Sexually active with  without dysparuinia. Hx of uterine ablation and bilateral tubal ligation. Pt had been fasting yesterday and ate at night. No diet changes from normal. No sick contacts. Pt denies F/C/N/V, HA, fatigue, CP, dyspnea, abdominal pain, and constipation/diarrhea She endorses urinary retention over the past year. Past Medical History:    Past Medical History:   Diagnosis Date    Arrhythmia     pt reports intermittent arrhythmias -- Dr. Steph Rios is cardiologist    Cataplexy     Essential hypertension 9/15/2017    High blood pressure     Migraine     Narcolepsy     Unspecified sleep apnea     Varicose veins of lower extremities 10/9/2011       Past Surgical History:  Past Surgical History:   Procedure Laterality Date    ENDOMETRIAL ABLATION  2009    TUBAL LIGATION      tubes tied 13 yrs ago --        Home Medications:  Has been reviewed and as documented. Allergies:    Patient has no known allergies.     Family

## 2019-07-18 ENCOUNTER — HOSPITAL ENCOUNTER (OUTPATIENT)
Dept: ULTRASOUND IMAGING | Age: 54
Discharge: HOME | End: 2019-07-18
Payer: COMMERCIAL

## 2019-07-18 DIAGNOSIS — R10.30 LOWER ABDOMINAL PAIN: ICD-10-CM

## 2019-07-18 PROCEDURE — 76830 TRANSVAGINAL US NON-OB: CPT

## 2019-07-18 PROCEDURE — 76856 US EXAM PELVIC COMPLETE: CPT

## 2019-09-26 ENCOUNTER — OFFICE VISIT (OUTPATIENT)
Dept: INTERNAL MEDICINE CLINIC | Age: 54
End: 2019-09-26
Payer: COMMERCIAL

## 2019-09-26 VITALS
HEART RATE: 77 BPM | TEMPERATURE: 98.1 F | BODY MASS INDEX: 38.19 KG/M2 | OXYGEN SATURATION: 98 % | HEIGHT: 68 IN | WEIGHT: 252 LBS | DIASTOLIC BLOOD PRESSURE: 82 MMHG | SYSTOLIC BLOOD PRESSURE: 128 MMHG

## 2019-09-26 DIAGNOSIS — K42.9 UMBILICAL HERNIA WITHOUT OBSTRUCTION AND WITHOUT GANGRENE: Primary | ICD-10-CM

## 2019-09-26 DIAGNOSIS — I83.93 VARICOSE VEINS OF BOTH LOWER EXTREMITIES, UNSPECIFIED WHETHER COMPLICATED: ICD-10-CM

## 2019-09-26 PROCEDURE — 1036F TOBACCO NON-USER: CPT | Performed by: INTERNAL MEDICINE

## 2019-09-26 PROCEDURE — 3017F COLORECTAL CA SCREEN DOC REV: CPT | Performed by: INTERNAL MEDICINE

## 2019-09-26 PROCEDURE — 99214 OFFICE O/P EST MOD 30 MIN: CPT | Performed by: INTERNAL MEDICINE

## 2019-09-26 PROCEDURE — G8427 DOCREV CUR MEDS BY ELIG CLIN: HCPCS | Performed by: INTERNAL MEDICINE

## 2019-09-26 PROCEDURE — G8417 CALC BMI ABV UP PARAM F/U: HCPCS | Performed by: INTERNAL MEDICINE

## 2019-09-26 NOTE — PROGRESS NOTES
Subjective:      Patient ID: Sidney Delatorre is a 47 y.o. female. HPIShe is here for a check up. Complains of an umbilical hernia which has increased in size. Minimal discomfort. She has varicose veins and questions whether something should be done about them. She continues to work on a more balanced meal plan and more physical activity. Review of Systems   Constitutional:        Elevated BMI at 38.9. See HPI. HENT: Negative. Eyes: Negative. Respiratory: Negative. Cardiovascular: Negative. Gastrointestinal:        Hernia, see HPI. Genitourinary: Negative. Musculoskeletal:        Varicose veins. Skin: Negative. Neurological: Negative. Psychiatric/Behavioral: Negative. Objective:   Physical Exam   Constitutional: She is oriented to person, place, and time. She appears well-developed and well-nourished. No distress. HENT:   Head: Normocephalic and atraumatic. Right Ear: External ear normal.   Left Ear: External ear normal.   Nose: Nose normal.   Mouth/Throat: Oropharynx is clear and moist.   Eyes: Pupils are equal, round, and reactive to light. Conjunctivae and EOM are normal. No scleral icterus. Neck: Normal range of motion. Neck supple. No thyromegaly present. Cardiovascular: Normal rate, regular rhythm, normal heart sounds and intact distal pulses. Pulmonary/Chest: Effort normal and breath sounds normal.   Abdominal: Soft. Bowel sounds are normal. She exhibits no mass. Umbilical hernia and keloid tissue. Tender to deeper palpation. Musculoskeletal:   Varicose veins, most prominent in calf region. Slightly tender. Lymphadenopathy:     She has no cervical adenopathy. Neurological: She is alert and oriented to person, place, and time. She has normal reflexes. Skin: Skin is warm and dry. Psychiatric: She has a normal mood and affect.  Her behavior is normal. Judgment and thought content normal.       Assessment:        Diagnosis

## 2019-09-30 ASSESSMENT — ENCOUNTER SYMPTOMS
EYES NEGATIVE: 1
RESPIRATORY NEGATIVE: 1

## 2019-10-16 ENCOUNTER — HOSPITAL ENCOUNTER (OUTPATIENT)
Dept: CT IMAGING | Age: 54
Discharge: HOME | End: 2019-10-16
Payer: COMMERCIAL

## 2019-10-16 DIAGNOSIS — K42.9 UMBILICAL HERNIA WITHOUT OBSTRUCTION AND WITHOUT GANGRENE: ICD-10-CM

## 2019-10-16 LAB
GFR AFRICAN AMERICAN: >60
GFR NON-AFRICAN AMERICAN: >60
PERFORMED ON: NORMAL
POC CREATININE: 0.7 MG/DL (ref 0.6–1.1)
POC SAMPLE TYPE: NORMAL

## 2019-10-16 PROCEDURE — 6360000004 HC RX CONTRAST MEDICATION: Performed by: INTERNAL MEDICINE

## 2019-10-16 PROCEDURE — 82565 ASSAY OF CREATININE: CPT

## 2019-10-16 PROCEDURE — 74160 CT ABDOMEN W/CONTRAST: CPT

## 2019-10-16 RX ADMIN — IOHEXOL 50 ML: 240 INJECTION, SOLUTION INTRATHECAL; INTRAVASCULAR; INTRAVENOUS; ORAL at 11:11

## 2019-10-16 RX ADMIN — IOPAMIDOL 75 ML: 755 INJECTION, SOLUTION INTRAVENOUS at 11:11

## 2019-10-18 ENCOUNTER — OFFICE VISIT (OUTPATIENT)
Dept: SURGERY | Age: 54
End: 2019-10-18
Payer: COMMERCIAL

## 2019-10-18 VITALS
DIASTOLIC BLOOD PRESSURE: 90 MMHG | BODY MASS INDEX: 39.25 KG/M2 | HEIGHT: 68 IN | WEIGHT: 259 LBS | TEMPERATURE: 98 F | SYSTOLIC BLOOD PRESSURE: 130 MMHG

## 2019-10-18 DIAGNOSIS — K43.2 INCISIONAL HERNIA, WITHOUT OBSTRUCTION OR GANGRENE: Primary | ICD-10-CM

## 2019-10-18 PROCEDURE — G8484 FLU IMMUNIZE NO ADMIN: HCPCS | Performed by: SURGERY

## 2019-10-18 PROCEDURE — 99203 OFFICE O/P NEW LOW 30 MIN: CPT | Performed by: SURGERY

## 2019-10-18 PROCEDURE — 3017F COLORECTAL CA SCREEN DOC REV: CPT | Performed by: SURGERY

## 2019-10-18 PROCEDURE — G8427 DOCREV CUR MEDS BY ELIG CLIN: HCPCS | Performed by: SURGERY

## 2019-10-18 PROCEDURE — G8417 CALC BMI ABV UP PARAM F/U: HCPCS | Performed by: SURGERY

## 2019-10-18 PROCEDURE — 1036F TOBACCO NON-USER: CPT | Performed by: SURGERY

## 2019-10-22 ENCOUNTER — TELEPHONE (OUTPATIENT)
Dept: SURGERY | Age: 54
End: 2019-10-22

## 2019-11-06 ENCOUNTER — TELEPHONE (OUTPATIENT)
Dept: INTERNAL MEDICINE CLINIC | Age: 54
End: 2019-11-06

## 2019-11-07 ENCOUNTER — OFFICE VISIT (OUTPATIENT)
Dept: INTERNAL MEDICINE CLINIC | Age: 54
End: 2019-11-07
Payer: COMMERCIAL

## 2019-11-07 VITALS
SYSTOLIC BLOOD PRESSURE: 130 MMHG | DIASTOLIC BLOOD PRESSURE: 80 MMHG | OXYGEN SATURATION: 99 % | TEMPERATURE: 98.3 F | HEART RATE: 77 BPM | BODY MASS INDEX: 40.49 KG/M2 | HEIGHT: 67 IN | WEIGHT: 258 LBS

## 2019-11-07 DIAGNOSIS — Z01.818 PRE-OP EXAMINATION: Primary | ICD-10-CM

## 2019-11-07 DIAGNOSIS — K42.9 UMBILICAL HERNIA WITHOUT OBSTRUCTION AND WITHOUT GANGRENE: ICD-10-CM

## 2019-11-07 PROCEDURE — G8427 DOCREV CUR MEDS BY ELIG CLIN: HCPCS | Performed by: INTERNAL MEDICINE

## 2019-11-07 PROCEDURE — G8484 FLU IMMUNIZE NO ADMIN: HCPCS | Performed by: INTERNAL MEDICINE

## 2019-11-07 PROCEDURE — 93000 ELECTROCARDIOGRAM COMPLETE: CPT | Performed by: INTERNAL MEDICINE

## 2019-11-07 PROCEDURE — 99244 OFF/OP CNSLTJ NEW/EST MOD 40: CPT | Performed by: INTERNAL MEDICINE

## 2019-11-07 PROCEDURE — G8417 CALC BMI ABV UP PARAM F/U: HCPCS | Performed by: INTERNAL MEDICINE

## 2019-11-25 ENCOUNTER — TELEPHONE (OUTPATIENT)
Dept: SURGERY | Age: 54
End: 2019-11-25

## 2019-11-27 ENCOUNTER — TELEPHONE (OUTPATIENT)
Dept: SURGERY | Age: 54
End: 2019-11-27

## 2019-12-02 ENCOUNTER — ANESTHESIA EVENT (OUTPATIENT)
Dept: OPERATING ROOM | Age: 54
End: 2019-12-02
Payer: COMMERCIAL

## 2019-12-03 ENCOUNTER — HOSPITAL ENCOUNTER (OUTPATIENT)
Age: 54
Setting detail: OUTPATIENT SURGERY
Discharge: HOME | End: 2019-12-03
Attending: SURGERY | Admitting: SURGERY
Payer: COMMERCIAL

## 2019-12-03 ENCOUNTER — ANESTHESIA (OUTPATIENT)
Dept: OPERATING ROOM | Age: 54
End: 2019-12-03
Payer: COMMERCIAL

## 2019-12-03 VITALS
HEIGHT: 68 IN | BODY MASS INDEX: 38.95 KG/M2 | WEIGHT: 257 LBS | HEART RATE: 77 BPM | SYSTOLIC BLOOD PRESSURE: 113 MMHG | RESPIRATION RATE: 18 BRPM | OXYGEN SATURATION: 93 % | DIASTOLIC BLOOD PRESSURE: 77 MMHG | TEMPERATURE: 98.4 F

## 2019-12-03 VITALS — TEMPERATURE: 97 F | DIASTOLIC BLOOD PRESSURE: 70 MMHG | OXYGEN SATURATION: 86 % | SYSTOLIC BLOOD PRESSURE: 148 MMHG

## 2019-12-03 DIAGNOSIS — K43.2 INCISIONAL HERNIA, WITHOUT OBSTRUCTION OR GANGRENE: Primary | ICD-10-CM

## 2019-12-03 LAB — PREGNANCY, URINE: NEGATIVE

## 2019-12-03 PROCEDURE — 6370000000 HC RX 637 (ALT 250 FOR IP): Performed by: STUDENT IN AN ORGANIZED HEALTH CARE EDUCATION/TRAINING PROGRAM

## 2019-12-03 PROCEDURE — 2709999900 HC NON-CHARGEABLE SUPPLY: Performed by: SURGERY

## 2019-12-03 PROCEDURE — 2500000003 HC RX 250 WO HCPCS: Performed by: SURGERY

## 2019-12-03 PROCEDURE — 2580000003 HC RX 258: Performed by: ANESTHESIOLOGY

## 2019-12-03 PROCEDURE — 7100000000 HC PACU RECOVERY - FIRST 15 MIN: Performed by: SURGERY

## 2019-12-03 PROCEDURE — 6360000002 HC RX W HCPCS: Performed by: SURGERY

## 2019-12-03 PROCEDURE — 49568 PR IMPLANT MESH HERNIA REPAIR/DEBRIDEMENT CLOSURE: CPT | Performed by: SURGERY

## 2019-12-03 PROCEDURE — 7100000010 HC PHASE II RECOVERY - FIRST 15 MIN: Performed by: SURGERY

## 2019-12-03 PROCEDURE — 2580000003 HC RX 258: Performed by: SURGERY

## 2019-12-03 PROCEDURE — 6360000002 HC RX W HCPCS: Performed by: ANESTHESIOLOGY

## 2019-12-03 PROCEDURE — 88304 TISSUE EXAM BY PATHOLOGIST: CPT

## 2019-12-03 PROCEDURE — 6360000002 HC RX W HCPCS: Performed by: NURSE ANESTHETIST, CERTIFIED REGISTERED

## 2019-12-03 PROCEDURE — 3700000001 HC ADD 15 MINUTES (ANESTHESIA): Performed by: SURGERY

## 2019-12-03 PROCEDURE — 2500000003 HC RX 250 WO HCPCS: Performed by: ANESTHESIOLOGY

## 2019-12-03 PROCEDURE — 6370000000 HC RX 637 (ALT 250 FOR IP): Performed by: SURGERY

## 2019-12-03 PROCEDURE — 49560 PR REPAIR INCISIONAL HERNIA,REDUCIBLE: CPT | Performed by: SURGERY

## 2019-12-03 PROCEDURE — C1781 MESH (IMPLANTABLE): HCPCS | Performed by: SURGERY

## 2019-12-03 PROCEDURE — 7100000011 HC PHASE II RECOVERY - ADDTL 15 MIN: Performed by: SURGERY

## 2019-12-03 PROCEDURE — 2500000003 HC RX 250 WO HCPCS: Performed by: NURSE ANESTHETIST, CERTIFIED REGISTERED

## 2019-12-03 PROCEDURE — 3600000004 HC SURGERY LEVEL 4 BASE: Performed by: SURGERY

## 2019-12-03 PROCEDURE — 3700000000 HC ANESTHESIA ATTENDED CARE: Performed by: SURGERY

## 2019-12-03 PROCEDURE — 88302 TISSUE EXAM BY PATHOLOGIST: CPT

## 2019-12-03 PROCEDURE — 7100000001 HC PACU RECOVERY - ADDTL 15 MIN: Performed by: SURGERY

## 2019-12-03 PROCEDURE — L0450 TLSO FLEX TRUNK/THOR PRE OTS: HCPCS | Performed by: SURGERY

## 2019-12-03 PROCEDURE — 84703 CHORIONIC GONADOTROPIN ASSAY: CPT

## 2019-12-03 PROCEDURE — 3600000014 HC SURGERY LEVEL 4 ADDTL 15MIN: Performed by: SURGERY

## 2019-12-03 DEVICE — MESH HERN W6XL6IN INGUINAL POLYPR SQ L PORE MFIL SFT KNIT: Type: IMPLANTABLE DEVICE | Site: ABDOMEN | Status: FUNCTIONAL

## 2019-12-03 RX ORDER — PROPOFOL 10 MG/ML
INJECTION, EMULSION INTRAVENOUS PRN
Status: DISCONTINUED | OUTPATIENT
Start: 2019-12-03 | End: 2019-12-03 | Stop reason: SDUPTHER

## 2019-12-03 RX ORDER — FENTANYL CITRATE 50 UG/ML
INJECTION, SOLUTION INTRAMUSCULAR; INTRAVENOUS PRN
Status: DISCONTINUED | OUTPATIENT
Start: 2019-12-03 | End: 2019-12-03 | Stop reason: SDUPTHER

## 2019-12-03 RX ORDER — DEXAMETHASONE SODIUM PHOSPHATE 4 MG/ML
INJECTION, SOLUTION INTRA-ARTICULAR; INTRALESIONAL; INTRAMUSCULAR; INTRAVENOUS; SOFT TISSUE PRN
Status: DISCONTINUED | OUTPATIENT
Start: 2019-12-03 | End: 2019-12-03 | Stop reason: SDUPTHER

## 2019-12-03 RX ORDER — SODIUM CHLORIDE, SODIUM LACTATE, POTASSIUM CHLORIDE, CALCIUM CHLORIDE 600; 310; 30; 20 MG/100ML; MG/100ML; MG/100ML; MG/100ML
INJECTION, SOLUTION INTRAVENOUS CONTINUOUS
Status: DISCONTINUED | OUTPATIENT
Start: 2019-12-03 | End: 2019-12-03 | Stop reason: HOSPADM

## 2019-12-03 RX ORDER — SODIUM CHLORIDE 0.9 % (FLUSH) 0.9 %
10 SYRINGE (ML) INJECTION PRN
Status: DISCONTINUED | OUTPATIENT
Start: 2019-12-03 | End: 2019-12-03 | Stop reason: HOSPADM

## 2019-12-03 RX ORDER — GLYCOPYRROLATE 0.2 MG/ML
0.2 INJECTION INTRAMUSCULAR; INTRAVENOUS ONCE
Status: COMPLETED | OUTPATIENT
Start: 2019-12-03 | End: 2019-12-03

## 2019-12-03 RX ORDER — OXYCODONE HYDROCHLORIDE 5 MG/1
5 TABLET ORAL EVERY 4 HOURS PRN
Status: DISCONTINUED | OUTPATIENT
Start: 2019-12-03 | End: 2019-12-03 | Stop reason: HOSPADM

## 2019-12-03 RX ORDER — LABETALOL 20 MG/4 ML (5 MG/ML) INTRAVENOUS SYRINGE
5 EVERY 10 MIN PRN
Status: DISCONTINUED | OUTPATIENT
Start: 2019-12-03 | End: 2019-12-03 | Stop reason: HOSPADM

## 2019-12-03 RX ORDER — MORPHINE SULFATE 4 MG/ML
2 INJECTION, SOLUTION INTRAMUSCULAR; INTRAVENOUS EVERY 5 MIN PRN
Status: DISCONTINUED | OUTPATIENT
Start: 2019-12-03 | End: 2019-12-03 | Stop reason: HOSPADM

## 2019-12-03 RX ORDER — MIDAZOLAM HYDROCHLORIDE 1 MG/ML
INJECTION INTRAMUSCULAR; INTRAVENOUS PRN
Status: DISCONTINUED | OUTPATIENT
Start: 2019-12-03 | End: 2019-12-03 | Stop reason: SDUPTHER

## 2019-12-03 RX ORDER — SODIUM CHLORIDE 0.9 % (FLUSH) 0.9 %
10 SYRINGE (ML) INJECTION EVERY 12 HOURS SCHEDULED
Status: DISCONTINUED | OUTPATIENT
Start: 2019-12-03 | End: 2019-12-03 | Stop reason: HOSPADM

## 2019-12-03 RX ORDER — ENALAPRILAT 2.5 MG/2ML
1.25 INJECTION INTRAVENOUS
Status: DISCONTINUED | OUTPATIENT
Start: 2019-12-03 | End: 2019-12-03 | Stop reason: HOSPADM

## 2019-12-03 RX ORDER — ONDANSETRON 2 MG/ML
4 INJECTION INTRAMUSCULAR; INTRAVENOUS ONCE
Status: COMPLETED | OUTPATIENT
Start: 2019-12-03 | End: 2019-12-03

## 2019-12-03 RX ORDER — FENTANYL CITRATE 50 UG/ML
25 INJECTION, SOLUTION INTRAMUSCULAR; INTRAVENOUS EVERY 5 MIN PRN
Status: DISCONTINUED | OUTPATIENT
Start: 2019-12-03 | End: 2019-12-03 | Stop reason: HOSPADM

## 2019-12-03 RX ORDER — LIDOCAINE HYDROCHLORIDE 10 MG/ML
1 INJECTION, SOLUTION EPIDURAL; INFILTRATION; INTRACAUDAL; PERINEURAL
Status: DISCONTINUED | OUTPATIENT
Start: 2019-12-03 | End: 2019-12-03 | Stop reason: HOSPADM

## 2019-12-03 RX ORDER — DOCUSATE SODIUM 100 MG/1
100 CAPSULE, LIQUID FILLED ORAL 2 TIMES DAILY
Qty: 10 CAPSULE | Refills: 1 | Status: SHIPPED | OUTPATIENT
Start: 2019-12-03 | End: 2019-12-08

## 2019-12-03 RX ORDER — DEXAMETHASONE SODIUM PHOSPHATE 4 MG/ML
4 INJECTION, SOLUTION INTRA-ARTICULAR; INTRALESIONAL; INTRAMUSCULAR; INTRAVENOUS; SOFT TISSUE ONCE
Status: COMPLETED | OUTPATIENT
Start: 2019-12-03 | End: 2019-12-03

## 2019-12-03 RX ORDER — ONDANSETRON 2 MG/ML
4 INJECTION INTRAMUSCULAR; INTRAVENOUS
Status: COMPLETED | OUTPATIENT
Start: 2019-12-03 | End: 2019-12-03

## 2019-12-03 RX ORDER — LIDOCAINE HYDROCHLORIDE 20 MG/ML
INJECTION, SOLUTION INTRAVENOUS PRN
Status: DISCONTINUED | OUTPATIENT
Start: 2019-12-03 | End: 2019-12-03 | Stop reason: SDUPTHER

## 2019-12-03 RX ORDER — ROCURONIUM BROMIDE 10 MG/ML
INJECTION, SOLUTION INTRAVENOUS PRN
Status: DISCONTINUED | OUTPATIENT
Start: 2019-12-03 | End: 2019-12-03 | Stop reason: SDUPTHER

## 2019-12-03 RX ORDER — BUPIVACAINE HYDROCHLORIDE 5 MG/ML
INJECTION, SOLUTION EPIDURAL; INTRACAUDAL PRN
Status: DISCONTINUED | OUTPATIENT
Start: 2019-12-03 | End: 2019-12-03 | Stop reason: ALTCHOICE

## 2019-12-03 RX ORDER — FIBRINOGEN HUMAN AND THROMBIN HUMAN 90; 500 [IU]/ML; [IU]/ML
SOLUTION TOPICAL PRN
Status: DISCONTINUED | OUTPATIENT
Start: 2019-12-03 | End: 2019-12-03 | Stop reason: ALTCHOICE

## 2019-12-03 RX ORDER — ONDANSETRON 2 MG/ML
INJECTION INTRAMUSCULAR; INTRAVENOUS PRN
Status: DISCONTINUED | OUTPATIENT
Start: 2019-12-03 | End: 2019-12-03 | Stop reason: SDUPTHER

## 2019-12-03 RX ORDER — HYDRALAZINE HYDROCHLORIDE 20 MG/ML
5 INJECTION INTRAMUSCULAR; INTRAVENOUS EVERY 5 MIN PRN
Status: DISCONTINUED | OUTPATIENT
Start: 2019-12-03 | End: 2019-12-03 | Stop reason: HOSPADM

## 2019-12-03 RX ORDER — OXYCODONE HYDROCHLORIDE 5 MG/1
5 TABLET ORAL EVERY 6 HOURS PRN
Qty: 20 TABLET | Refills: 0 | Status: SHIPPED | OUTPATIENT
Start: 2019-12-03 | End: 2019-12-08

## 2019-12-03 RX ORDER — MAGNESIUM HYDROXIDE 1200 MG/15ML
LIQUID ORAL CONTINUOUS PRN
Status: COMPLETED | OUTPATIENT
Start: 2019-12-03 | End: 2019-12-03

## 2019-12-03 RX ADMIN — FENTANYL CITRATE 100 MCG: 50 INJECTION INTRAMUSCULAR; INTRAVENOUS at 09:45

## 2019-12-03 RX ADMIN — Medication 2 G: at 09:46

## 2019-12-03 RX ADMIN — SODIUM CHLORIDE, SODIUM LACTATE, POTASSIUM CHLORIDE, AND CALCIUM CHLORIDE: 600; 310; 30; 20 INJECTION, SOLUTION INTRAVENOUS at 07:57

## 2019-12-03 RX ADMIN — PROPOFOL 150 MG: 10 INJECTION, EMULSION INTRAVENOUS at 09:45

## 2019-12-03 RX ADMIN — ROCURONIUM BROMIDE 10 MG: 10 INJECTION, SOLUTION INTRAVENOUS at 10:37

## 2019-12-03 RX ADMIN — FENTANYL CITRATE 25 MCG: 50 INJECTION INTRAMUSCULAR; INTRAVENOUS at 12:00

## 2019-12-03 RX ADMIN — FENTANYL CITRATE 25 MCG: 50 INJECTION INTRAMUSCULAR; INTRAVENOUS at 13:13

## 2019-12-03 RX ADMIN — ROCURONIUM BROMIDE 50 MG: 10 INJECTION, SOLUTION INTRAVENOUS at 09:45

## 2019-12-03 RX ADMIN — DEXAMETHASONE SODIUM PHOSPHATE 4 MG: 4 INJECTION, SOLUTION INTRAMUSCULAR; INTRAVENOUS at 10:48

## 2019-12-03 RX ADMIN — OXYCODONE HYDROCHLORIDE 5 MG: 5 TABLET ORAL at 15:12

## 2019-12-03 RX ADMIN — FENTANYL CITRATE 25 MCG: 50 INJECTION INTRAMUSCULAR; INTRAVENOUS at 12:06

## 2019-12-03 RX ADMIN — ONDANSETRON 4 MG: 2 INJECTION INTRAMUSCULAR; INTRAVENOUS at 08:18

## 2019-12-03 RX ADMIN — ONDANSETRON 4 MG: 2 INJECTION INTRAMUSCULAR; INTRAVENOUS at 11:24

## 2019-12-03 RX ADMIN — HYDROMORPHONE HYDROCHLORIDE 0.5 MG: 1 INJECTION, SOLUTION INTRAMUSCULAR; INTRAVENOUS; SUBCUTANEOUS at 13:43

## 2019-12-03 RX ADMIN — ONDANSETRON 4 MG: 2 INJECTION INTRAMUSCULAR; INTRAVENOUS at 13:45

## 2019-12-03 RX ADMIN — DEXAMETHASONE SODIUM PHOSPHATE 4 MG: 4 INJECTION, SOLUTION INTRAMUSCULAR; INTRAVENOUS at 08:17

## 2019-12-03 RX ADMIN — ROCURONIUM BROMIDE 10 MG: 10 INJECTION, SOLUTION INTRAVENOUS at 10:10

## 2019-12-03 RX ADMIN — MIDAZOLAM HYDROCHLORIDE 2 MG: 2 INJECTION, SOLUTION INTRAMUSCULAR; INTRAVENOUS at 09:35

## 2019-12-03 RX ADMIN — LIDOCAINE HYDROCHLORIDE 50 MG: 20 INJECTION, SOLUTION INTRAVENOUS at 09:45

## 2019-12-03 RX ADMIN — SUGAMMADEX 200 MG: 100 INJECTION, SOLUTION INTRAVENOUS at 11:27

## 2019-12-03 RX ADMIN — FENTANYL CITRATE 50 MCG: 50 INJECTION INTRAMUSCULAR; INTRAVENOUS at 11:42

## 2019-12-03 RX ADMIN — GLYCOPYRROLATE 0.2 MG: 0.2 INJECTION, SOLUTION INTRAMUSCULAR; INTRAVENOUS at 08:55

## 2019-12-03 RX ADMIN — FENTANYL CITRATE 100 MCG: 50 INJECTION INTRAMUSCULAR; INTRAVENOUS at 10:04

## 2019-12-03 RX ADMIN — ROCURONIUM BROMIDE 10 MG: 10 INJECTION, SOLUTION INTRAVENOUS at 11:00

## 2019-12-03 RX ADMIN — ROCURONIUM BROMIDE 10 MG: 10 INJECTION, SOLUTION INTRAVENOUS at 10:28

## 2019-12-03 ASSESSMENT — PULMONARY FUNCTION TESTS
PIF_VALUE: 23
PIF_VALUE: 20
PIF_VALUE: 23
PIF_VALUE: 21
PIF_VALUE: 20
PIF_VALUE: 20
PIF_VALUE: 19
PIF_VALUE: 20
PIF_VALUE: 1
PIF_VALUE: 19
PIF_VALUE: 22
PIF_VALUE: 0
PIF_VALUE: 20
PIF_VALUE: 2
PIF_VALUE: 20
PIF_VALUE: 21
PIF_VALUE: 20
PIF_VALUE: 19
PIF_VALUE: 4
PIF_VALUE: 23
PIF_VALUE: 19
PIF_VALUE: 22
PIF_VALUE: 22
PIF_VALUE: 19
PIF_VALUE: 20
PIF_VALUE: 21
PIF_VALUE: 19
PIF_VALUE: 4
PIF_VALUE: 0
PIF_VALUE: 20
PIF_VALUE: 23
PIF_VALUE: 1
PIF_VALUE: 19
PIF_VALUE: 19
PIF_VALUE: 21
PIF_VALUE: 21
PIF_VALUE: 20
PIF_VALUE: 25
PIF_VALUE: 19
PIF_VALUE: 20
PIF_VALUE: 20
PIF_VALUE: 22
PIF_VALUE: 22
PIF_VALUE: 21
PIF_VALUE: 20
PIF_VALUE: 21
PIF_VALUE: 23
PIF_VALUE: 21
PIF_VALUE: 21
PIF_VALUE: 22
PIF_VALUE: 19
PIF_VALUE: 20
PIF_VALUE: 1
PIF_VALUE: 1
PIF_VALUE: 19
PIF_VALUE: 19
PIF_VALUE: 21
PIF_VALUE: 20
PIF_VALUE: 20
PIF_VALUE: 18
PIF_VALUE: 21
PIF_VALUE: 4
PIF_VALUE: 21
PIF_VALUE: 21
PIF_VALUE: 19
PIF_VALUE: 20
PIF_VALUE: 3
PIF_VALUE: 20
PIF_VALUE: 20
PIF_VALUE: 23
PIF_VALUE: 19
PIF_VALUE: 20
PIF_VALUE: 23
PIF_VALUE: 22
PIF_VALUE: 20
PIF_VALUE: 21
PIF_VALUE: 24
PIF_VALUE: 21
PIF_VALUE: 21
PIF_VALUE: 23
PIF_VALUE: 20
PIF_VALUE: 21
PIF_VALUE: 19
PIF_VALUE: 0
PIF_VALUE: 22
PIF_VALUE: 23
PIF_VALUE: 1
PIF_VALUE: 3
PIF_VALUE: 21
PIF_VALUE: 21
PIF_VALUE: 23
PIF_VALUE: 29
PIF_VALUE: 22
PIF_VALUE: 26
PIF_VALUE: 20
PIF_VALUE: 21
PIF_VALUE: 21
PIF_VALUE: 19
PIF_VALUE: 20
PIF_VALUE: 24
PIF_VALUE: 19
PIF_VALUE: 23
PIF_VALUE: 21
PIF_VALUE: 19
PIF_VALUE: 23
PIF_VALUE: 19
PIF_VALUE: 21
PIF_VALUE: 21
PIF_VALUE: 2
PIF_VALUE: 19
PIF_VALUE: 19
PIF_VALUE: 24
PIF_VALUE: 0
PIF_VALUE: 21

## 2019-12-03 ASSESSMENT — PAIN SCALES - GENERAL
PAINLEVEL_OUTOF10: 4
PAINLEVEL_OUTOF10: 5
PAINLEVEL_OUTOF10: 7
PAINLEVEL_OUTOF10: 4

## 2019-12-03 ASSESSMENT — PAIN DESCRIPTION - PAIN TYPE: TYPE: SURGICAL PAIN

## 2019-12-03 ASSESSMENT — PAIN DESCRIPTION - ORIENTATION: ORIENTATION: MID

## 2019-12-03 ASSESSMENT — PAIN - FUNCTIONAL ASSESSMENT: PAIN_FUNCTIONAL_ASSESSMENT: 0-10

## 2019-12-03 ASSESSMENT — PAIN DESCRIPTION - DESCRIPTORS: DESCRIPTORS: ACHING;PRESSURE

## 2019-12-03 ASSESSMENT — PAIN DESCRIPTION - LOCATION: LOCATION: ABDOMEN

## 2019-12-04 ENCOUNTER — TELEPHONE (OUTPATIENT)
Dept: SURGERY | Age: 54
End: 2019-12-04

## 2019-12-13 ENCOUNTER — OFFICE VISIT (OUTPATIENT)
Dept: SURGERY | Age: 54
End: 2019-12-13

## 2019-12-13 VITALS
HEIGHT: 68 IN | SYSTOLIC BLOOD PRESSURE: 134 MMHG | BODY MASS INDEX: 39.56 KG/M2 | DIASTOLIC BLOOD PRESSURE: 92 MMHG | WEIGHT: 261 LBS

## 2019-12-13 DIAGNOSIS — Z09 POSTOP CHECK: Primary | ICD-10-CM

## 2019-12-13 PROCEDURE — 99024 POSTOP FOLLOW-UP VISIT: CPT | Performed by: SURGERY

## 2020-06-03 ENCOUNTER — VIRTUAL VISIT (OUTPATIENT)
Dept: INTERNAL MEDICINE CLINIC | Age: 55
End: 2020-06-03
Payer: COMMERCIAL

## 2020-06-03 PROCEDURE — 3017F COLORECTAL CA SCREEN DOC REV: CPT | Performed by: INTERNAL MEDICINE

## 2020-06-03 PROCEDURE — G8427 DOCREV CUR MEDS BY ELIG CLIN: HCPCS | Performed by: INTERNAL MEDICINE

## 2020-06-03 PROCEDURE — 99214 OFFICE O/P EST MOD 30 MIN: CPT | Performed by: INTERNAL MEDICINE

## 2020-06-03 PROCEDURE — G8417 CALC BMI ABV UP PARAM F/U: HCPCS | Performed by: INTERNAL MEDICINE

## 2020-06-03 PROCEDURE — 1036F TOBACCO NON-USER: CPT | Performed by: INTERNAL MEDICINE

## 2020-06-03 ASSESSMENT — PATIENT HEALTH QUESTIONNAIRE - PHQ9
SUM OF ALL RESPONSES TO PHQ QUESTIONS 1-9: 0
2. FEELING DOWN, DEPRESSED OR HOPELESS: 0
SUM OF ALL RESPONSES TO PHQ9 QUESTIONS 1 & 2: 0
SUM OF ALL RESPONSES TO PHQ QUESTIONS 1-9: 0
1. LITTLE INTEREST OR PLEASURE IN DOING THINGS: 0

## 2020-06-03 NOTE — PROGRESS NOTES
6/3/2020    TELEHEALTH EVALUATION -- Audio/Visual (During WQFAJ-90 public health emergency)    HPI:    Micah Christopher (:  1965) has requested an audio/video evaluation for the following concern(s):    Elevated blood pressure: was treated with b/p med briefly but now is diet controlled. B/P 123/83, 137/89. Continues to work on balanced meal planning and regular physical activity. Varicose veins: wearing support hose now. Seem to help. Review of Systems   Constitutional:        Elevated BMI 40.28 last office visit. HENT: Negative. Eyes: Negative. Respiratory: Negative. Cardiovascular:        Elevated b/p, h/o HTN. See HPI. See HPI. Gastrointestinal: Negative. Endocrine:        Elevated lipids have been noted. Genitourinary: Negative. Musculoskeletal:        Varicose veins, see HPI. Skin: Negative. Neurological: Negative. Psychiatric/Behavioral: Negative. Prior to Visit Medications    Medication Sig Taking? Authorizing Provider   amLODIPine (NORVASC) 2.5 MG tablet Take 1 tablet by mouth daily  Patient not taking: Reported on 2019  Ledy Caraballo MD   sodium chloride (ALTAMIST SPRAY) 0.65 % nasal spray 1 spray by Nasal route as needed for Congestion  Patient not taking: Reported on 2019  NICOLE Merino - CNP   ibuprofen (ADVIL;MOTRIN) 800 MG tablet Take 1 tablet by mouth every 8 hours as needed for Pain  Subhash Spears MD   ascorbic acid (VITAMIN C) 500 MG tablet Take 500 mg by mouth daily. Historical Provider, MD       Social History     Tobacco Use    Smoking status: Never Smoker    Smokeless tobacco: Never Used   Substance Use Topics    Alcohol use: No     Alcohol/week: 0.0 standard drinks    Drug use:  No            PHYSICAL EXAMINATION:  [ INSTRUCTIONS:  \"[x]\" Indicates a positive item  \"[]\" Indicates a negative item  -- DELETE ALL ITEMS NOT EXAMINED]  Vital Signs: (As obtained by patient/caregiver or practitioner observation)    Blood pressure-123/83  Heart rate-    Respiratory rate-    Temperature-  Pulse oximetry-     Constitutional: [x] Appears well-developed and well-nourished [x] No apparent distress      [] Abnormal-   Mental status  [x] Alert and awake  [x] Oriented to person/place/time []Able to follow commands      Eyes:  EOM    [x]  Normal  [] Abnormal-  Sclera  [x]  Normal  [] Abnormal -         Discharge [x]  None visible  [] Abnormal -    HENT:   [x] Normocephalic, atraumatic. [] Abnormal   [x] Mouth/Throat: Mucous membranes are moist.     External Ears [x] Normal  [] Abnormal-     Neck: [x] No visualized mass     Pulmonary/Chest: [x] Respiratory effort normal.  [x] No visualized signs of difficulty breathing or respiratory distress        [] Abnormal-      Musculoskeletal:   [x] Normal gait with no signs of ataxia         [x] Normal range of motion of neck             Varicose both lower extremities. [] Abnormal-       Neurological:        [x] No Facial Asymmetry (Cranial nerve 7 motor function) (limited exam to video visit)          [x] No gaze palsy        [] Abnormal-         Skin:        [x] No significant exanthematous lesions or discoloration noted on facial skin         [] Abnormal-            Psychiatric:       [x] Normal Affect [] No Hallucinations        [] Abnormal-     Other pertinent observable physical exam findings-     ASSESSMENT/PLAN:   Diagnosis Orders   1. Elevated blood pressure reading  DASH diet and low sodium diet discussed. TSH with Reflex    CBC    MICROALBUMIN / CREATININE URINE RATIO   2. Breast cancer screening  RODY DIGITAL SCREEN W OR WO CAD BILATERAL    COMPREHENSIVE METABOLIC PANEL   3. Mixed hyperlipidemia  Lipid Panel  Heart healthy diet discussed. 4. Varicose veins of both lower extremities, unspecified whether complicated  Weight reduction, support hose, lower sodium discussed. No follow-ups on file.     Due to this being a TeleHealth encounter (During GTWTL-51 public health emergency), evaluation of the following organ systems was limited: Vitals/Constitutional/EENT/Resp/CV/GI//MS/Neuro/Skin/Heme-Lymph-Imm. Pursuant to the emergency declaration under the [facility], [address] [place] [address] authority and the [employer] Act, this Virtual Visit was conducted with patient's (and/or legal guardian's) consent, to reduce the patient's risk of exposure to COVID-19 and provide necessary medical care. The patient (and/or legal guardian) has also been advised to contact this office for worsening conditions or problems, and seek emergency medical treatment and/or call 911 if deemed necessary. Patient identification was verified at the start of the visit: YES. Total time spent on this encounter: billing not based on time. Services were provided through a video synchronous discussion virtually to substitute for in-person clinic visit. Patient and provider were located at their individual homes. --Ruba Watts MD on 6/3/2020 at 4:56 PM    An electronic signature was used to authenticate this note.

## 2020-07-05 ASSESSMENT — ENCOUNTER SYMPTOMS
EYES NEGATIVE: 1
RESPIRATORY NEGATIVE: 1
GASTROINTESTINAL NEGATIVE: 1

## 2020-11-20 DIAGNOSIS — E78.2 MIXED HYPERLIPIDEMIA: ICD-10-CM

## 2020-11-20 DIAGNOSIS — Z12.39 BREAST CANCER SCREENING: ICD-10-CM

## 2020-11-20 DIAGNOSIS — R03.0 ELEVATED BLOOD PRESSURE READING: ICD-10-CM

## 2020-11-21 LAB
A/G RATIO: 1.7 (ref 1.1–2.2)
ALBUMIN SERPL-MCNC: 4.4 G/DL (ref 3.4–5)
ALP BLD-CCNC: 81 U/L (ref 40–129)
ALT SERPL-CCNC: 18 U/L (ref 10–40)
ANION GAP SERPL CALCULATED.3IONS-SCNC: 11 MMOL/L (ref 3–16)
AST SERPL-CCNC: 13 U/L (ref 15–37)
BILIRUB SERPL-MCNC: <0.2 MG/DL (ref 0–1)
BUN BLDV-MCNC: 14 MG/DL (ref 7–20)
CALCIUM SERPL-MCNC: 10.7 MG/DL (ref 8.3–10.6)
CHLORIDE BLD-SCNC: 105 MMOL/L (ref 99–110)
CHOLESTEROL, TOTAL: 214 MG/DL (ref 0–199)
CO2: 27 MMOL/L (ref 21–32)
CREAT SERPL-MCNC: 0.5 MG/DL (ref 0.6–1.1)
CREATININE URINE: 222.4 MG/DL (ref 28–259)
GFR AFRICAN AMERICAN: >60
GFR NON-AFRICAN AMERICAN: >60
GLOBULIN: 2.6 G/DL
GLUCOSE BLD-MCNC: 86 MG/DL (ref 70–99)
HCT VFR BLD CALC: 42.3 % (ref 36–48)
HDLC SERPL-MCNC: 53 MG/DL (ref 40–60)
HEMOGLOBIN: 14.3 G/DL (ref 12–16)
LDL CHOLESTEROL CALCULATED: 142 MG/DL
MCH RBC QN AUTO: 31.7 PG (ref 26–34)
MCHC RBC AUTO-ENTMCNC: 33.9 G/DL (ref 31–36)
MCV RBC AUTO: 93.5 FL (ref 80–100)
MICROALBUMIN UR-MCNC: <1.2 MG/DL
MICROALBUMIN/CREAT UR-RTO: NORMAL MG/G (ref 0–30)
PDW BLD-RTO: 13.1 % (ref 12.4–15.4)
PLATELET # BLD: 176 K/UL (ref 135–450)
PMV BLD AUTO: 9.5 FL (ref 5–10.5)
POTASSIUM SERPL-SCNC: 4.2 MMOL/L (ref 3.5–5.1)
RBC # BLD: 4.52 M/UL (ref 4–5.2)
SODIUM BLD-SCNC: 143 MMOL/L (ref 136–145)
TOTAL PROTEIN: 7 G/DL (ref 6.4–8.2)
TRIGL SERPL-MCNC: 93 MG/DL (ref 0–150)
TSH REFLEX: 1.33 UIU/ML (ref 0.27–4.2)
VLDLC SERPL CALC-MCNC: 19 MG/DL
WBC # BLD: 2.8 K/UL (ref 4–11)

## 2020-11-24 ENCOUNTER — TELEPHONE (OUTPATIENT)
Dept: INTERNAL MEDICINE CLINIC | Age: 55
End: 2020-11-24

## 2020-11-24 NOTE — TELEPHONE ENCOUNTER
----- Message from Rosario Arnel sent at 11/24/2020 10:05 AM EST -----  Subject: Message to Provider    QUESTIONS  Information for Provider? Pt is requesting orders for the COVID 19   anti-body testing. Pt would like a call back when this has been ordered. She would like to know if she had COVID 19 or not.  ---------------------------------------------------------------------------  --------------  CALL BACK INFO  What is the best way for the office to contact you? OK to leave message on   voicemail  Preferred Call Back Phone Number? [phone]  ---------------------------------------------------------------------------  --------------  SCRIPT ANSWERS  Relationship to Patient?  Self

## 2021-01-21 ENCOUNTER — OFFICE VISIT (OUTPATIENT)
Dept: INTERNAL MEDICINE CLINIC | Age: 56
End: 2021-01-21
Payer: COMMERCIAL

## 2021-01-21 VITALS
WEIGHT: 270 LBS | DIASTOLIC BLOOD PRESSURE: 64 MMHG | TEMPERATURE: 97.2 F | SYSTOLIC BLOOD PRESSURE: 130 MMHG | OXYGEN SATURATION: 99 % | HEIGHT: 67 IN | BODY MASS INDEX: 42.38 KG/M2 | HEART RATE: 92 BPM

## 2021-01-21 DIAGNOSIS — I10 ESSENTIAL HYPERTENSION: Primary | ICD-10-CM

## 2021-01-21 DIAGNOSIS — R73.02 IGT (IMPAIRED GLUCOSE TOLERANCE): ICD-10-CM

## 2021-01-21 LAB
CHP ED QC CHECK: NORMAL
GLUCOSE BLD-MCNC: 100 MG/DL
HBA1C MFR BLD: 5.9 %

## 2021-01-21 PROCEDURE — 1036F TOBACCO NON-USER: CPT | Performed by: INTERNAL MEDICINE

## 2021-01-21 PROCEDURE — 82962 GLUCOSE BLOOD TEST: CPT | Performed by: INTERNAL MEDICINE

## 2021-01-21 PROCEDURE — 83036 HEMOGLOBIN GLYCOSYLATED A1C: CPT | Performed by: INTERNAL MEDICINE

## 2021-01-21 PROCEDURE — 99214 OFFICE O/P EST MOD 30 MIN: CPT | Performed by: INTERNAL MEDICINE

## 2021-01-21 PROCEDURE — G8484 FLU IMMUNIZE NO ADMIN: HCPCS | Performed by: INTERNAL MEDICINE

## 2021-01-21 PROCEDURE — G8427 DOCREV CUR MEDS BY ELIG CLIN: HCPCS | Performed by: INTERNAL MEDICINE

## 2021-01-21 PROCEDURE — G8417 CALC BMI ABV UP PARAM F/U: HCPCS | Performed by: INTERNAL MEDICINE

## 2021-01-21 PROCEDURE — 3017F COLORECTAL CA SCREEN DOC REV: CPT | Performed by: INTERNAL MEDICINE

## 2021-01-21 SDOH — ECONOMIC STABILITY: FOOD INSECURITY: WITHIN THE PAST 12 MONTHS, THE FOOD YOU BOUGHT JUST DIDN'T LAST AND YOU DIDN'T HAVE MONEY TO GET MORE.: NEVER TRUE

## 2021-01-21 SDOH — ECONOMIC STABILITY: TRANSPORTATION INSECURITY
IN THE PAST 12 MONTHS, HAS THE LACK OF TRANSPORTATION KEPT YOU FROM MEDICAL APPOINTMENTS OR FROM GETTING MEDICATIONS?: NO

## 2021-01-21 ASSESSMENT — PATIENT HEALTH QUESTIONNAIRE - PHQ9
SUM OF ALL RESPONSES TO PHQ QUESTIONS 1-9: 0
SUM OF ALL RESPONSES TO PHQ QUESTIONS 1-9: 0

## 2021-01-21 NOTE — PROGRESS NOTES
Patient: Melanie Hough is a 48 y.o. female who presents today with the following Chief Complaint(s):    Chief Complaint   Patient presents with    Hypertension         HPI She is here for a check up and f/u on hypertension, elevated BMI and blood sugar. She continues to work on better meal planning and more physical activity. She does a treadmill work out, twice weekly. 30 minutes. Current Outpatient Medications   Medication Sig Dispense Refill    sodium chloride (ALTAMIST SPRAY) 0.65 % nasal spray 1 spray by Nasal route as needed for Congestion 1 Bottle 3    ascorbic acid (VITAMIN C) 500 MG tablet Take 500 mg by mouth daily.  ibuprofen (ADVIL;MOTRIN) 800 MG tablet Take 1 tablet by mouth every 8 hours as needed for Pain 15 tablet 0     No current facility-administered medications for this visit. Patient's past medical history, surgical history, family history, medications,and allergies  were all reviewed and updated as appropriate today. Review of Systems   Constitutional:        Elevated BMI 42. 3. see HPI. HENT: Negative. Eyes: Negative. Respiratory: Negative. Cardiovascular:        H/O HTN. Diet controlled. Gastrointestinal: Negative. Endocrine:        H/O elevated blood sugar. A1c 5.9. Genitourinary: Negative. Musculoskeletal: Negative. Skin: Negative. Neurological: Negative. Psychiatric/Behavioral: Negative. Physical Exam  Constitutional:       General: She is not in acute distress. Appearance: She is well-developed. HENT:      Head: Normocephalic and atraumatic. Right Ear: External ear normal.      Left Ear: External ear normal.      Nose: Nose normal.   Eyes:      General: No scleral icterus. Conjunctiva/sclera: Conjunctivae normal.      Pupils: Pupils are equal, round, and reactive to light. Neck:      Musculoskeletal: Normal range of motion and neck supple. Thyroid: No thyromegaly.    Cardiovascular: Rate and Rhythm: Normal rate and regular rhythm. Heart sounds: Normal heart sounds. Pulmonary:      Effort: Pulmonary effort is normal.      Breath sounds: Normal breath sounds. Abdominal:      General: Bowel sounds are normal.      Palpations: Abdomen is soft. There is no mass. Lymphadenopathy:      Cervical: No cervical adenopathy. Skin:     General: Skin is warm and dry. Neurological:      Mental Status: She is alert and oriented to person, place, and time. Deep Tendon Reflexes: Reflexes are normal and symmetric. Psychiatric:         Behavior: Behavior normal.         Thought Content: Thought content normal.         Judgment: Judgment normal.         Vitals:    01/21/21 1302   BP: 130/64   Pulse: 92   Temp: 97.2 °F (36.2 °C)   SpO2: 99%       Assessment:   Diagnosis Orders   1. Essential hypertension  No medication at this time. DASH and low sodium diet emphasized. 2. IGT (impaired glucose tolerance)  Diet, physical activity and weight reduction stressed. 3. BMI 40.0-44.9, adult (HCC)  Lower calories and regular physical activity discussed. Plan:  See plans above.

## 2021-02-06 ASSESSMENT — ENCOUNTER SYMPTOMS
GASTROINTESTINAL NEGATIVE: 1
RESPIRATORY NEGATIVE: 1
EYES NEGATIVE: 1

## 2021-04-15 LAB
ALBUMIN SERPL-MCNC: 4.2 G/DL (ref 3.4–5)
ALP BLD-CCNC: 82 U/L (ref 40–129)
ALT SERPL-CCNC: 16 U/L (ref 10–40)
AST SERPL-CCNC: 20 U/L (ref 15–37)
BILIRUB SERPL-MCNC: 0.3 MG/DL (ref 0–1)
BILIRUBIN DIRECT: <0.2 MG/DL (ref 0–0.3)
BILIRUBIN, INDIRECT: NORMAL MG/DL (ref 0–1)
TOTAL PROTEIN: 7.1 G/DL (ref 6.4–8.2)

## 2021-04-21 ENCOUNTER — OFFICE VISIT (OUTPATIENT)
Dept: INTERNAL MEDICINE CLINIC | Age: 56
End: 2021-04-21
Payer: COMMERCIAL

## 2021-04-21 VITALS
BODY MASS INDEX: 42.79 KG/M2 | HEART RATE: 72 BPM | WEIGHT: 272.6 LBS | SYSTOLIC BLOOD PRESSURE: 125 MMHG | DIASTOLIC BLOOD PRESSURE: 80 MMHG | HEIGHT: 67 IN | OXYGEN SATURATION: 98 %

## 2021-04-21 DIAGNOSIS — F43.21 GRIEF AT LOSS OF CHILD: ICD-10-CM

## 2021-04-21 DIAGNOSIS — I10 ESSENTIAL HYPERTENSION: Primary | ICD-10-CM

## 2021-04-21 DIAGNOSIS — Z63.4 GRIEF AT LOSS OF CHILD: ICD-10-CM

## 2021-04-21 PROCEDURE — 1036F TOBACCO NON-USER: CPT | Performed by: INTERNAL MEDICINE

## 2021-04-21 PROCEDURE — G8427 DOCREV CUR MEDS BY ELIG CLIN: HCPCS | Performed by: INTERNAL MEDICINE

## 2021-04-21 PROCEDURE — 3017F COLORECTAL CA SCREEN DOC REV: CPT | Performed by: INTERNAL MEDICINE

## 2021-04-21 PROCEDURE — G8417 CALC BMI ABV UP PARAM F/U: HCPCS | Performed by: INTERNAL MEDICINE

## 2021-04-21 PROCEDURE — 99214 OFFICE O/P EST MOD 30 MIN: CPT | Performed by: INTERNAL MEDICINE

## 2021-04-21 SDOH — SOCIAL STABILITY - SOCIAL INSECURITY: DISSAPEARANCE AND DEATH OF FAMILY MEMBER: Z63.4

## 2021-04-21 SDOH — EDUCATIONAL SECURITY: EDUCATION ATTAINMENT
WHAT IS THE HIGHEST LEVEL OF SCHOOL YOU HAVE COMPLETED OR THE HIGHEST DEGREE YOU HAVE RECEIVED?: ASSOCIATE DEGREE: OCCUPATIONAL, TECHNICAL, OR VOCATIONAL PROGRAM

## 2021-04-21 ASSESSMENT — PATIENT HEALTH QUESTIONNAIRE - PHQ9
1. LITTLE INTEREST OR PLEASURE IN DOING THINGS: 0
SUM OF ALL RESPONSES TO PHQ QUESTIONS 1-9: 0
SUM OF ALL RESPONSES TO PHQ QUESTIONS 1-9: 0
SUM OF ALL RESPONSES TO PHQ9 QUESTIONS 1 & 2: 0

## 2021-04-21 NOTE — PROGRESS NOTES
Patient: Kenzie Vidal is a 48 y.o. female who presents today with the following Chief Complaint(s):    Chief Complaint   Patient presents with    Hypertension         HPI She is here for a check up. Grieving the death of her son who was murdered recently. Doing the best she can in the process. She has a H/O HTN managed without medication. Mindful of DASH/low sodium diet. She is obese and continues to work on regular physical activity and meal planning. Current Outpatient Medications   Medication Sig Dispense Refill    sodium chloride (ALTAMIST SPRAY) 0.65 % nasal spray 1 spray by Nasal route as needed for Congestion 1 Bottle 3    ibuprofen (ADVIL;MOTRIN) 800 MG tablet Take 1 tablet by mouth every 8 hours as needed for Pain 15 tablet 0    ascorbic acid (VITAMIN C) 500 MG tablet Take 500 mg by mouth daily. No current facility-administered medications for this visit. Patient's past medical history, surgical history, family history, medications,and allergies  were all reviewed and updated as appropriate today. Left medial ankle rash. Ventral hernia keloid. Review of Systems   Constitutional:        Elevated BMI at 42. 7. see HPI. HENT: Negative. Eyes: Negative. Respiratory: Negative. Cardiovascular:        HTN, see HPI. Gastrointestinal: Negative. Genitourinary: Negative. Musculoskeletal: Negative. Skin: Negative. Neurological: Negative. Psychiatric/Behavioral:        Grief, see HPI. Physical Exam    Vitals:    04/21/21 1148   BP: 125/80   Pulse: 72   SpO2: 98%       Assessment:   Diagnosis Orders   1. Essential hypertension  DASH and low sodium diet discussed. Weight loss and physical activity stressed. 2. Grief at loss of child  Counseled and discussed the stages of grief. Advised formal counseling. 3. BMI 40.0-44.9, adult (HCC)  Lower calories and regular physical activity discussed.         Plan:  See

## 2021-05-21 ASSESSMENT — ENCOUNTER SYMPTOMS
GASTROINTESTINAL NEGATIVE: 1
EYES NEGATIVE: 1
RESPIRATORY NEGATIVE: 1

## 2021-06-02 ENCOUNTER — OFFICE VISIT (OUTPATIENT)
Dept: INTERNAL MEDICINE CLINIC | Age: 56
End: 2021-06-02
Payer: COMMERCIAL

## 2021-06-02 VITALS
HEIGHT: 67 IN | BODY MASS INDEX: 42.38 KG/M2 | OXYGEN SATURATION: 97 % | HEART RATE: 76 BPM | WEIGHT: 270 LBS | SYSTOLIC BLOOD PRESSURE: 130 MMHG | DIASTOLIC BLOOD PRESSURE: 70 MMHG

## 2021-06-02 DIAGNOSIS — B34.9 VIRAL SYNDROME: Primary | ICD-10-CM

## 2021-06-02 DIAGNOSIS — Z63.4 GRIEF AT LOSS OF CHILD: ICD-10-CM

## 2021-06-02 DIAGNOSIS — I10 ESSENTIAL HYPERTENSION: ICD-10-CM

## 2021-06-02 DIAGNOSIS — F43.21 GRIEF AT LOSS OF CHILD: ICD-10-CM

## 2021-06-02 PROCEDURE — 1036F TOBACCO NON-USER: CPT | Performed by: INTERNAL MEDICINE

## 2021-06-02 PROCEDURE — G8417 CALC BMI ABV UP PARAM F/U: HCPCS | Performed by: INTERNAL MEDICINE

## 2021-06-02 PROCEDURE — 3017F COLORECTAL CA SCREEN DOC REV: CPT | Performed by: INTERNAL MEDICINE

## 2021-06-02 PROCEDURE — 99214 OFFICE O/P EST MOD 30 MIN: CPT | Performed by: INTERNAL MEDICINE

## 2021-06-02 PROCEDURE — G8427 DOCREV CUR MEDS BY ELIG CLIN: HCPCS | Performed by: INTERNAL MEDICINE

## 2021-06-02 RX ORDER — TERBINAFINE HYDROCHLORIDE 250 MG/1
TABLET ORAL
Status: ON HOLD | COMMUNITY
Start: 2021-04-19 | End: 2022-11-03 | Stop reason: HOSPADM

## 2021-06-02 SDOH — EDUCATIONAL SECURITY: EDUCATION ATTAINMENT: WHAT IS THE HIGHEST LEVEL OF SCHOOL YOU HAVE COMPLETED OR THE HIGHEST DEGREE YOU HAVE RECEIVED?: PATIENT REFUSED

## 2021-06-02 SDOH — SOCIAL STABILITY - SOCIAL INSECURITY: DISSAPEARANCE AND DEATH OF FAMILY MEMBER: Z63.4

## 2021-06-02 SDOH — ECONOMIC STABILITY: FOOD INSECURITY: WITHIN THE PAST 12 MONTHS, THE FOOD YOU BOUGHT JUST DIDN'T LAST AND YOU DIDN'T HAVE MONEY TO GET MORE.: NEVER TRUE

## 2021-06-02 SDOH — ECONOMIC STABILITY: INCOME INSECURITY: HOW HARD IS IT FOR YOU TO PAY FOR THE VERY BASICS LIKE FOOD, HOUSING, MEDICAL CARE, AND HEATING?: NOT VERY HARD

## 2021-06-02 ASSESSMENT — PATIENT HEALTH QUESTIONNAIRE - PHQ9
SUM OF ALL RESPONSES TO PHQ QUESTIONS 1-9: 0
SUM OF ALL RESPONSES TO PHQ QUESTIONS 1-9: 0
1. LITTLE INTEREST OR PLEASURE IN DOING THINGS: 0
2. FEELING DOWN, DEPRESSED OR HOPELESS: 0
SUM OF ALL RESPONSES TO PHQ QUESTIONS 1-9: 0
SUM OF ALL RESPONSES TO PHQ9 QUESTIONS 1 & 2: 0

## 2021-06-02 NOTE — PROGRESS NOTES
Patient: Veda Lujan is a 48 y.o. female who presents today with the following Chief Complaint(s):    Chief Complaint   Patient presents with    Follow-up         HPI She recently experienced sob, anosmia, ageusia,, fatigue. Not tested for COVID 19 infection. Took C and D vitamins, beet juice with ginger. She got thru it. Feels almost back to baseline now. Current Outpatient Medications   Medication Sig Dispense Refill    terbinafine (LAMISIL) 250 MG tablet TAKE 1 TABLET BY MOUTH DAILY      sodium chloride (ALTAMIST SPRAY) 0.65 % nasal spray 1 spray by Nasal route as needed for Congestion 1 Bottle 3    ibuprofen (ADVIL;MOTRIN) 800 MG tablet Take 1 tablet by mouth every 8 hours as needed for Pain 15 tablet 0    ascorbic acid (VITAMIN C) 500 MG tablet Take 500 mg by mouth daily. No current facility-administered medications for this visit. Patient's past medical history, surgical history, family history, medications,and allergies  were all reviewed and updated as appropriate today. Review of Systems   Constitutional:        See HPI. Elevated BMI at 41.9. HENT: Negative. Eyes: Negative. Respiratory:        See HPI. Cardiovascular:        Hypertension, controlled with lifestyle. Gastrointestinal: Negative. Genitourinary: Negative. Musculoskeletal: Negative. Skin: Negative. Neurological: Negative. Psychiatric/Behavioral:        In grief from the death of her son. Doing a bit better. Physical Exam  Constitutional:       General: She is not in acute distress. Appearance: She is well-developed. She is obese. HENT:      Head: Normocephalic and atraumatic. Right Ear: External ear normal.      Left Ear: External ear normal.      Nose: Nose normal.   Eyes:      General: No scleral icterus. Conjunctiva/sclera: Conjunctivae normal.      Pupils: Pupils are equal, round, and reactive to light. Neck:      Thyroid: No thyromegaly.

## 2021-06-10 LAB
ALBUMIN SERPL-MCNC: 4.2 G/DL (ref 3.4–5)
ALP BLD-CCNC: 75 U/L (ref 40–129)
ALT SERPL-CCNC: 20 U/L (ref 10–40)
AST SERPL-CCNC: 13 U/L (ref 15–37)
BILIRUB SERPL-MCNC: 0.4 MG/DL (ref 0–1)
BILIRUBIN DIRECT: <0.2 MG/DL (ref 0–0.3)
BILIRUBIN, INDIRECT: ABNORMAL MG/DL (ref 0–1)
TOTAL PROTEIN: 6.7 G/DL (ref 6.4–8.2)

## 2021-06-25 ENCOUNTER — OFFICE VISIT (OUTPATIENT)
Dept: INTERNAL MEDICINE CLINIC | Age: 56
End: 2021-06-25
Payer: COMMERCIAL

## 2021-06-25 VITALS
DIASTOLIC BLOOD PRESSURE: 70 MMHG | HEIGHT: 67 IN | WEIGHT: 268 LBS | BODY MASS INDEX: 42.06 KG/M2 | SYSTOLIC BLOOD PRESSURE: 128 MMHG | HEART RATE: 82 BPM | OXYGEN SATURATION: 93 %

## 2021-06-25 DIAGNOSIS — M25.461 PAIN AND SWELLING OF RIGHT KNEE: Primary | ICD-10-CM

## 2021-06-25 DIAGNOSIS — M25.561 PAIN AND SWELLING OF RIGHT KNEE: Primary | ICD-10-CM

## 2021-06-25 PROCEDURE — 1036F TOBACCO NON-USER: CPT | Performed by: NURSE PRACTITIONER

## 2021-06-25 PROCEDURE — G8417 CALC BMI ABV UP PARAM F/U: HCPCS | Performed by: NURSE PRACTITIONER

## 2021-06-25 PROCEDURE — 99213 OFFICE O/P EST LOW 20 MIN: CPT | Performed by: NURSE PRACTITIONER

## 2021-06-25 PROCEDURE — 3017F COLORECTAL CA SCREEN DOC REV: CPT | Performed by: NURSE PRACTITIONER

## 2021-06-25 PROCEDURE — G8427 DOCREV CUR MEDS BY ELIG CLIN: HCPCS | Performed by: NURSE PRACTITIONER

## 2021-06-25 RX ORDER — NAPROXEN 500 MG/1
500 TABLET ORAL 2 TIMES DAILY WITH MEALS
Qty: 60 TABLET | Refills: 0 | Status: SHIPPED | OUTPATIENT
Start: 2021-06-25 | End: 2021-10-30

## 2021-06-25 RX ORDER — TRIAMCINOLONE ACETONIDE 1 MG/G
CREAM TOPICAL
Status: ON HOLD | COMMUNITY
Start: 2021-04-29 | End: 2022-11-03 | Stop reason: HOSPADM

## 2021-06-25 ASSESSMENT — ENCOUNTER SYMPTOMS
GASTROINTESTINAL NEGATIVE: 1
RESPIRATORY NEGATIVE: 1

## 2021-06-25 NOTE — PATIENT INSTRUCTIONS
I will call or send a Panorama9 message when I get your xray results. Patient Education        Knee Pain or Injury: Care Instructions  Your Care Instructions     Injuries are a common cause of knee problems. Sudden (acute) injuries may be caused by a direct blow to the knee. They can also be caused by abnormal twisting, bending, or falling on the knee. Pain, bruising, or swelling may be severe, and may start within minutes of the injury. Overuse is another cause of knee pain. Other causes are climbing stairs, kneeling, and other activities that use the knee. Everyday wear and tear, especially as you get older, also can cause knee pain. Rest, along with home treatment, often relieves pain and allows your knee to heal. If you have a serious knee injury, you may need tests and treatment. Follow-up care is a key part of your treatment and safety. Be sure to make and go to all appointments, and call your doctor if you are having problems. It's also a good idea to know your test results and keep a list of the medicines you take. How can you care for yourself at home? · Be safe with medicines. Read and follow all instructions on the label. ? If the doctor gave you a prescription medicine for pain, take it as prescribed. ? If you are not taking a prescription pain medicine, ask your doctor if you can take an over-the-counter medicine. · Rest and protect your knee. Take a break from any activity that may cause pain. · Put ice or a cold pack on your knee for 10 to 20 minutes at a time. Put a thin cloth between the ice and your skin. · Prop up a sore knee on a pillow when you ice it or anytime you sit or lie down for the next 3 days. Try to keep it above the level of your heart. This will help reduce swelling. · If your knee is not swollen, you can put moist heat, a heating pad, or a warm cloth on your knee.   · If your doctor recommends an elastic bandage, sleeve, or other type of support for your knee, wear it as an account, please click on the \"Sign Up Now\" link. Current as of: October 19, 2020               Content Version: 12.9  © 2006-2021 Healthwise[employer]. Care instructions adapted under license by [facility] [place] ([facility]). If you have questions about a medical condition or this instruction, always ask your healthcare professional. [address] any warranty or liability for your use of this information.

## 2021-06-25 NOTE — PROGRESS NOTES
Patient: Florecita Dorsey is a 48 y.o. female who presents today with the following Chief Complaint(s):  Chief Complaint   Patient presents with    Knee Pain     right side        HPI:  Patient presents to the office c/o right knee pain that started about 5 days ago. Knee Pain   The incident occurred 3 to 5 days ago. There was no injury mechanism. The pain is present in the right knee. The quality of the pain is described as aching. The pain is at a severity of 7/10. The pain is moderate. The pain has been improving since onset. Pertinent negatives include no inability to bear weight, loss of motion, loss of sensation, muscle weakness, numbness or tingling. She reports no foreign bodies present. The symptoms are aggravated by weight bearing and palpation (sitting to standing). Treatments tried: topical pain meds and knee brace. The treatment provided mild relief. Current Outpatient Medications   Medication Sig Dispense Refill    triamcinolone (KENALOG) 0.1 % cream APPLY TOPICALLY TO THE AFFECTED AREA TWICE DAILY      sodium oxybate (XYREM) 500 MG/ML SOLN solution Take by mouth 2 times daily as needed.  cyanocobalamin 1000 MCG tablet Take 1,000 mcg by mouth daily      naproxen (NAPROSYN) 500 MG tablet Take 1 tablet by mouth 2 times daily (with meals) 60 tablet 0    terbinafine (LAMISIL) 250 MG tablet TAKE 1 TABLET BY MOUTH DAILY      sodium chloride (ALTAMIST SPRAY) 0.65 % nasal spray 1 spray by Nasal route as needed for Congestion 1 Bottle 3    ascorbic acid (VITAMIN C) 500 MG tablet Take 500 mg by mouth daily.  ibuprofen (ADVIL;MOTRIN) 800 MG tablet Take 1 tablet by mouth every 8 hours as needed for Pain 15 tablet 0     No current facility-administered medications for this visit. Patient's past medical history, surgical history, family history, medications,  and allergies  were all reviewed and updated as appropriate today.     Patient Active Problem List Diagnosis    Narcolepsy    Cataplexy    Varicose veins of both lower extremities    History of migraine    Essential hypertension    Plantar fasciitis of right foot    Incisional hernia, without obstruction or gangrene    Pain and swelling of right knee     Past Medical History:   Diagnosis Date    Arrhythmia     pt reports intermittent arrhythmias -- Dr. Roc Quiñones is cardiologist    Cataplexy     Essential hypertension 9/15/2017    High blood pressure     Migraine     Narcolepsy     Unspecified sleep apnea     NO CPAP    Varicose veins of lower extremities 10/9/2011      Past Surgical History:   Procedure Laterality Date    COLONOSCOPY      ENDOMETRIAL ABLATION  06/2009    TUBAL LIGATION      tubes tied 13 yrs ago -- 2001    [facility] N/A 12/3/2019    OPEN INCISIONAL HERNIA REPAIR WITH UMBILECTOMY performed by Jeremy Sun MD at [address] History   Problem Relation Age of Onset    Diabetes Mother     High Blood Pressure Mother     Mental Illness Father     Arthritis Father     Rheum Arthritis Neg Hx     Osteoarthritis Neg Hx     Asthma Neg Hx     Breast Cancer Neg Hx     Cancer Neg Hx     Heart Failure Neg Hx     High Cholesterol Neg Hx     Hypertension Neg Hx     Migraines Neg Hx     Ovarian Cancer Neg Hx     Rashes/Skin Problems Neg Hx     Seizures Neg Hx     Stroke Neg Hx     Thyroid Disease Neg Hx       No Known Allergies      Review of Systems   Constitutional: Negative. Respiratory: Negative. Cardiovascular: Negative. Gastrointestinal: Negative. Genitourinary: Negative. Musculoskeletal: Positive for arthralgias and joint swelling. Skin: Negative. Neurological: Negative for tingling and numbness. Psychiatric/Behavioral: Negative.         Vitals:    06/25/21 1018   BP: 128/70   Pulse: 82   SpO2: 93%   Weight: 268 lb (121.6 kg)   Height: 5' 7\" (1.702 m)     Physical Exam  Constitutional:       General: She is not in acute distress. Appearance: Normal appearance. She is not ill-appearing, toxic-appearing or diaphoretic. Cardiovascular:      Rate and Rhythm: Normal rate and regular rhythm. Pulses: Normal pulses. Heart sounds: Normal heart sounds. No murmur heard. No friction rub. No gallop. Comments: Negative Francesco's sign  Pulmonary:      Effort: Pulmonary effort is normal. No respiratory distress. Breath sounds: Normal breath sounds. No stridor. No wheezing, rhonchi or rales. Abdominal:      General: Bowel sounds are normal. There is no distension. Palpations: Abdomen is soft. Tenderness: There is no abdominal tenderness. There is no guarding. Musculoskeletal:      Cervical back: Normal range of motion and neck supple. No rigidity. Right knee: Swelling and crepitus present. Normal range of motion. Tenderness present. Left knee: Normal.      Right lower leg: No edema. Left lower leg: No edema. Skin:     General: Skin is warm and dry. Neurological:      Mental Status: She is alert and oriented to person, place, and time. Psychiatric:         Mood and Affect: Mood normal.         Behavior: Behavior normal.         Thought Content: Thought content normal.         Judgment: Judgment normal.         Assessment/Plan:  1. Pain and swelling of right knee  - XR KNEE RIGHT (3 VIEWS); Future        Return if symptoms worsen or fail to improve.

## 2021-06-29 ENCOUNTER — HOSPITAL ENCOUNTER (OUTPATIENT)
Age: 56
Discharge: HOME | End: 2021-06-29
Payer: COMMERCIAL

## 2021-06-29 ENCOUNTER — HOSPITAL ENCOUNTER (OUTPATIENT)
Dept: GENERAL RADIOLOGY | Age: 56
Discharge: HOME | End: 2021-06-29
Payer: COMMERCIAL

## 2021-06-29 DIAGNOSIS — M25.561 PAIN AND SWELLING OF RIGHT KNEE: ICD-10-CM

## 2021-06-29 DIAGNOSIS — M25.461 PAIN AND SWELLING OF RIGHT KNEE: ICD-10-CM

## 2021-06-29 PROCEDURE — 73562 X-RAY EXAM OF KNEE 3: CPT

## 2021-06-29 PROCEDURE — 73560 X-RAY EXAM OF KNEE 1 OR 2: CPT

## 2021-06-30 DIAGNOSIS — M25.461 PAIN AND SWELLING OF RIGHT KNEE: Primary | ICD-10-CM

## 2021-06-30 DIAGNOSIS — M25.461 KNEE EFFUSION, RIGHT: ICD-10-CM

## 2021-06-30 DIAGNOSIS — M25.561 PAIN AND SWELLING OF RIGHT KNEE: Primary | ICD-10-CM

## 2021-07-01 ASSESSMENT — ENCOUNTER SYMPTOMS
GASTROINTESTINAL NEGATIVE: 1
EYES NEGATIVE: 1

## 2021-07-22 ENCOUNTER — OFFICE VISIT (OUTPATIENT)
Dept: ORTHOPEDIC SURGERY | Age: 56
End: 2021-07-22

## 2021-07-22 VITALS
HEART RATE: 76 BPM | SYSTOLIC BLOOD PRESSURE: 117 MMHG | HEIGHT: 67 IN | DIASTOLIC BLOOD PRESSURE: 76 MMHG | BODY MASS INDEX: 42.38 KG/M2 | WEIGHT: 270 LBS

## 2021-07-22 DIAGNOSIS — M25.461 PAIN AND SWELLING OF RIGHT KNEE: Primary | ICD-10-CM

## 2021-07-22 DIAGNOSIS — M25.561 PAIN AND SWELLING OF RIGHT KNEE: Primary | ICD-10-CM

## 2021-07-22 PROCEDURE — G8417 CALC BMI ABV UP PARAM F/U: HCPCS | Performed by: ORTHOPAEDIC SURGERY

## 2021-07-22 PROCEDURE — 99203 OFFICE O/P NEW LOW 30 MIN: CPT | Performed by: ORTHOPAEDIC SURGERY

## 2021-07-22 PROCEDURE — 3017F COLORECTAL CA SCREEN DOC REV: CPT | Performed by: ORTHOPAEDIC SURGERY

## 2021-07-22 PROCEDURE — 1036F TOBACCO NON-USER: CPT | Performed by: ORTHOPAEDIC SURGERY

## 2021-07-22 PROCEDURE — G8427 DOCREV CUR MEDS BY ELIG CLIN: HCPCS | Performed by: ORTHOPAEDIC SURGERY

## 2021-07-22 NOTE — PROGRESS NOTES
Patient Name: Bon Sabillon  : 1965  DOS: 2021        Chief Complaint:   Chief Complaint   Patient presents with    Knee Pain     RIGHT KNEE-Pain for one month, no injury       History of Present Illness:  Bon Sabillon is a 48 y.o. female who presents with a chief complaint of continued complaints of pain in the knee with irritation with walking, stairs and with overuse. Pain in the knee regularly with swelling and discomfort. Increase in pain over the past several months time. No obvious injury. No history of lupus/Ra    No additional joint issues. No exposure to woods/camping. Medical History  Current Medications:   Prior to Admission medications    Medication Sig Start Date End Date Taking? Authorizing Provider   diclofenac (VOLTAREN) 50 MG EC tablet Take 1 tablet by mouth 2 times daily (with meals) 21  Yes Harpal Kennedy MD   triamcinolone (KENALOG) 0.1 % cream APPLY TOPICALLY TO THE AFFECTED AREA TWICE DAILY 21   Historical Provider, MD   sodium oxybate (XYREM) 500 MG/ML SOLN solution Take by mouth 2 times daily as needed. Historical Provider, MD   cyanocobalamin 1000 MCG tablet Take 1,000 mcg by mouth daily    Historical Provider, MD   naproxen (NAPROSYN) 500 MG tablet Take 1 tablet by mouth 2 times daily (with meals) 21   NICOLE Carrera   terbinafine (LAMISIL) 250 MG tablet TAKE 1 TABLET BY MOUTH DAILY 21   Historical Provider, MD   sodium chloride (ALTAMIST SPRAY) 0.65 % nasal spray 1 spray by Nasal route as needed for Congestion 9/15/17   NICOLE York - CNP   ibuprofen (ADVIL;MOTRIN) 800 MG tablet Take 1 tablet by mouth every 8 hours as needed for Pain 17  Jaime Topete MD   ascorbic acid (VITAMIN C) 500 MG tablet Take 500 mg by mouth daily.     Historical Provider, MD     Allergies: No Known Allergies    Review of Systems:     Review of Systems ______  Constitutional: Negative for fever and diaphoresis. ____________  Respiratory: Negative for shortness of breath.  ________  Gastrointestinal: Negative for abdominal pain. ________  Musculoskeletal: Positive for joint pain. ____  Skin: Negative for itching. ____  Neurological: Negative for loss of consciousness.  ______________  All other systems reviewed and negative     Past Medical History:   Diagnosis Date    Arrhythmia     pt reports intermittent arrhythmias -- Dr. Monica Lovell is cardiologist    Cataplexy     Essential hypertension 9/15/2017    High blood pressure     Migraine     Narcolepsy     Unspecified sleep apnea     NO CPAP    Varicose veins of lower extremities 10/9/2011     Family History   Problem Relation Age of Onset    Diabetes Mother     High Blood Pressure Mother     Mental Illness Father     Arthritis Father     Rheum Arthritis Neg Hx     Osteoarthritis Neg Hx     Asthma Neg Hx     Breast Cancer Neg Hx     Cancer Neg Hx     Heart Failure Neg Hx     High Cholesterol Neg Hx     Hypertension Neg Hx     Migraines Neg Hx     Ovarian Cancer Neg Hx     Rashes/Skin Problems Neg Hx     Seizures Neg Hx     Stroke Neg Hx     Thyroid Disease Neg Hx      Social History     Socioeconomic History    Marital status:      Spouse name: Not on file    Number of children: Not on file    Years of education: Not on file    Highest education level: Not on file   Occupational History    Not on file   Tobacco Use    Smoking status: Never Smoker    Smokeless tobacco: Never Used   Vaping Use    Vaping Use: Never used   Substance and Sexual Activity    Alcohol use: No     Alcohol/week: 0.0 standard drinks    Drug use: No    Sexual activity: Yes     Partners: Male   Other Topics Concern    Not on file   Social History Narrative    Not on file     Social Determinants of Health     Financial Resource Strain: Low Risk     Difficulty of Paying Living Expenses: Not hard at all   Food Insecurity: No Food Insecurity    Worried About [address] in the Last Year: Never true    [place] of Food in the Last Year: Never true   Transportation Needs: No Transportation Needs    Lack of Transportation (Medical): No    Lack of Transportation (Non-Medical): No   Physical Activity:     Days of Exercise per Week:     Minutes of Exercise per Session:    Stress:     Feeling of Stress :    Social Connections:     Frequency of Communication with Friends and Family:     Frequency of Social Gatherings with Friends and Family:     Attends [employer] Services:     Active Member of Clubs or Organizations:     Attends Club or Organization Meetings:     Marital Status:    Intimate Partner Violence:     Fear of Current or Ex-Partner:     Emotionally Abused:     Physically Abused:     Sexually Abused:          Physical Exam __  Constitutional: She is oriented to person, place, and time and well-developed, well-nourished, and in no distress. No distress. ____  HENT:   Head: Normocephalic and atraumatic. ____  Eyes: Conjunctivae are normal. ________  Cardiovascular: Intact distal pulses. ____  Pulmonary/Chest: Effort normal. ________________________  Neurological: She is alert and oriented to person, place, and time. ____  Skin: Skin is dry. She is not diaphoretic. ____  Psychiatric: Mood, affect and judgment normal. ______          Assessment   Vital Signs:      Vitals:    07/22/21 1428   BP: 117/76   Pulse: 76       right Knee shows evidence for no obvious pseudolaxity, pain with weight bearing, antalgic gait relatively mild    Inspection: Moderate anterior swelling. Swelling is present with  mild effusion. The posterior aspect of the knee appears to be full with tenderness. There is no erythema, rash, or ecchymosis. Range of Motion:  Right 0-120 left0-120     Pain with medial joint line and mild patellofemroal testing    There is deformitynone    Strength:  Hamstrings rated: 4/5.  Quadriceps rated: 5/5    Palpation: There is moderate tenderness along the medial joint line. Special Tests: Patellar Compression test is mildly positive. Valgus & Varus test is positive. No obvious instability  Skin: There are no rashes, ulcerations or lesions. Gait: Gait pattern is antalgic  Skin shows no rashes/ecchymosis to the affected area, no hyperesthesias, no discoloration, no temperature or color discrepancies. NEUROLOGICALLY: There is no evidence for sensory or motor deficits in the extremity. Coordination appears full with no spacticity or rigidity. Reflexes appear to be symmetric. Distal circulation intact. No signs of RSD. Additional Comments:no hip pathology. Procedure(s): none    Diagnostic Test Findings:   Xray   Have reviewed the xrays above from 07/22/21   and my impression is: no major osteoarthritis changes. Assessment and Plan:       Diagnosis Orders   1. Pain and swelling of right knee                The orders below, if any, were placed during this visit:   No orders of the defined types were placed in this encounter. Treatment Plan:     I discussed the diagnosis of arthritis with the patient. We've discussed treatment options which would include anti-inflammatory medication, physical therapy, bracing, cortisone injections, and Visco supplementation. We have also discussed the benefits of low impact exercise and weight loss. Diclofenac. No physical therapy at this point as she has adequate strength and rom. .     Swelling control. Use ice on a regular basis to control pain inflammation and swelling.           Rory Brown MD

## 2021-10-20 ENCOUNTER — OFFICE VISIT (OUTPATIENT)
Dept: INTERNAL MEDICINE CLINIC | Age: 56
End: 2021-10-20
Payer: COMMERCIAL

## 2021-10-20 VITALS
OXYGEN SATURATION: 95 % | HEIGHT: 67 IN | WEIGHT: 272.6 LBS | TEMPERATURE: 98.3 F | SYSTOLIC BLOOD PRESSURE: 130 MMHG | BODY MASS INDEX: 42.79 KG/M2 | DIASTOLIC BLOOD PRESSURE: 80 MMHG | HEART RATE: 83 BPM

## 2021-10-20 DIAGNOSIS — Z12.31 ENCOUNTER FOR SCREENING MAMMOGRAM FOR BREAST CANCER: ICD-10-CM

## 2021-10-20 DIAGNOSIS — R73.02 IGT (IMPAIRED GLUCOSE TOLERANCE): ICD-10-CM

## 2021-10-20 DIAGNOSIS — Z00.00 PHYSICAL EXAM: Primary | ICD-10-CM

## 2021-10-20 DIAGNOSIS — M17.11 ARTHRITIS OF RIGHT KNEE: ICD-10-CM

## 2021-10-20 LAB
CHP ED QC CHECK: NORMAL
GLUCOSE BLD-MCNC: 83 MG/DL
HBA1C MFR BLD: 5.4 %

## 2021-10-20 PROCEDURE — G8427 DOCREV CUR MEDS BY ELIG CLIN: HCPCS | Performed by: INTERNAL MEDICINE

## 2021-10-20 PROCEDURE — G8484 FLU IMMUNIZE NO ADMIN: HCPCS | Performed by: INTERNAL MEDICINE

## 2021-10-20 PROCEDURE — 3017F COLORECTAL CA SCREEN DOC REV: CPT | Performed by: INTERNAL MEDICINE

## 2021-10-20 PROCEDURE — 82962 GLUCOSE BLOOD TEST: CPT | Performed by: INTERNAL MEDICINE

## 2021-10-20 PROCEDURE — 1036F TOBACCO NON-USER: CPT | Performed by: INTERNAL MEDICINE

## 2021-10-20 PROCEDURE — G8417 CALC BMI ABV UP PARAM F/U: HCPCS | Performed by: INTERNAL MEDICINE

## 2021-10-20 PROCEDURE — 99396 PREV VISIT EST AGE 40-64: CPT | Performed by: INTERNAL MEDICINE

## 2021-10-20 PROCEDURE — 83036 HEMOGLOBIN GLYCOSYLATED A1C: CPT | Performed by: INTERNAL MEDICINE

## 2021-10-20 RX ORDER — MELOXICAM 7.5 MG/1
7.5 TABLET ORAL 2 TIMES DAILY PRN
Qty: 30 TABLET | Refills: 1 | Status: ON HOLD | OUTPATIENT
Start: 2021-10-20 | End: 2022-11-03 | Stop reason: HOSPADM

## 2021-10-20 SDOH — ECONOMIC STABILITY: FOOD INSECURITY: WITHIN THE PAST 12 MONTHS, THE FOOD YOU BOUGHT JUST DIDN'T LAST AND YOU DIDN'T HAVE MONEY TO GET MORE.: NEVER TRUE

## 2021-10-20 SDOH — EDUCATIONAL SECURITY: EDUCATION ATTAINMENT: WHAT IS THE HIGHEST LEVEL OF SCHOOL YOU HAVE COMPLETED OR THE HIGHEST DEGREE YOU HAVE RECEIVED?: PATIENT REFUSED

## 2021-10-20 SDOH — ECONOMIC STABILITY: INCOME INSECURITY: HOW HARD IS IT FOR YOU TO PAY FOR THE VERY BASICS LIKE FOOD, HOUSING, MEDICAL CARE, AND HEATING?: NOT HARD AT ALL

## 2021-10-20 ASSESSMENT — PATIENT HEALTH QUESTIONNAIRE - PHQ9
1. LITTLE INTEREST OR PLEASURE IN DOING THINGS: 0
SUM OF ALL RESPONSES TO PHQ QUESTIONS 1-9: 0
2. FEELING DOWN, DEPRESSED OR HOPELESS: 0
SUM OF ALL RESPONSES TO PHQ9 QUESTIONS 1 & 2: 0
SUM OF ALL RESPONSES TO PHQ QUESTIONS 1-9: 0
SUM OF ALL RESPONSES TO PHQ QUESTIONS 1-9: 0

## 2021-10-20 NOTE — PROGRESS NOTES
Patient: Trinidad Black is a 55 y.o. female who presents today with the following Chief Complaint(s):    Chief Complaint   Patient presents with    Annual Exam    Abdominal Pain    Joint Pain     knee right          HPI She is here for a physical exam. Complains of right knee pain and stiffness with radiographic proof of arthritis. Doing exercises and working on weight reduction. Has seen ortho. Current Outpatient Medications   Medication Sig Dispense Refill    diclofenac (VOLTAREN) 50 MG EC tablet Take 1 tablet by mouth 2 times daily (with meals) 60 tablet 3    triamcinolone (KENALOG) 0.1 % cream APPLY TOPICALLY TO THE AFFECTED AREA TWICE DAILY      sodium oxybate (XYREM) 500 MG/ML SOLN solution Take by mouth 2 times daily as needed.  cyanocobalamin 1000 MCG tablet Take 1,000 mcg by mouth daily      naproxen (NAPROSYN) 500 MG tablet Take 1 tablet by mouth 2 times daily (with meals) 60 tablet 0    terbinafine (LAMISIL) 250 MG tablet TAKE 1 TABLET BY MOUTH DAILY      sodium chloride (ALTAMIST SPRAY) 0.65 % nasal spray 1 spray by Nasal route as needed for Congestion 1 Bottle 3    ascorbic acid (VITAMIN C) 500 MG tablet Take 500 mg by mouth daily.  ibuprofen (ADVIL;MOTRIN) 800 MG tablet Take 1 tablet by mouth every 8 hours as needed for Pain 15 tablet 0     No current facility-administered medications for this visit. Patient's past medical history, surgical history, family history, medications,and allergies  were all reviewed and updated as appropriate today. Review of Systems   Constitutional:        Elevated BMI at 42.7. HENT: Negative. Eyes: Negative. Respiratory: Negative. Cardiovascular:        Hypertension, diet controlled. Gastrointestinal: Negative. Endocrine:        IGT, A1c 5.4 currently. Diet controlled. Genitourinary: Negative. Musculoskeletal:        Right knee arthritis, see HPI. Skin: Negative. Neurological: Negative. Psychiatric/Behavioral: Negative. Physical Exam  Constitutional:       General: She is not in acute distress. Appearance: She is well-developed. HENT:      Head: Normocephalic and atraumatic. Right Ear: External ear normal.      Left Ear: External ear normal.      Nose: Nose normal.   Eyes:      General: No scleral icterus. Conjunctiva/sclera: Conjunctivae normal.      Pupils: Pupils are equal, round, and reactive to light. Neck:      Thyroid: No thyromegaly. Cardiovascular:      Rate and Rhythm: Normal rate and regular rhythm. Heart sounds: Normal heart sounds. Pulmonary:      Effort: Pulmonary effort is normal.      Breath sounds: Normal breath sounds. Abdominal:      General: Bowel sounds are normal.      Palpations: Abdomen is soft. There is no mass. Musculoskeletal:      Cervical back: Normal range of motion and neck supple. Lymphadenopathy:      Cervical: No cervical adenopathy. Skin:     General: Skin is warm and dry. Neurological:      Mental Status: She is alert and oriented to person, place, and time. Deep Tendon Reflexes: Reflexes are normal and symmetric. Psychiatric:         Behavior: Behavior normal.         Thought Content: Thought content normal.         Judgment: Judgment normal.         Vitals:    10/20/21 1146   BP: 130/80   Pulse: 83   Temp: 98.3 °F (36.8 °C)   SpO2: 95%       Assessment:   Diagnosis Orders   1. IGT (impaired glucose tolerance)  POCT Glucose  Diet, exercise and weight reduction discussed. POCT glycosylated hemoglobin (Hb A1C)   2. Arthritis of right knee  Exercises discussed. External Referral To Orthopedic Surgery    meloxicam (MOBIC) 7.5 MG tablet   3. BMI 40.0-44.9, adult (HCC)  Lower calories and regular physical activity discussed. 4. Encounter for screening mammogram for breast cancer  RODY Digital Screen Bilateral [[id number]]   5. Physical exam: health and wellness advice given. Questions answered.  Literature

## 2021-10-30 ASSESSMENT — ENCOUNTER SYMPTOMS
EYES NEGATIVE: 1
RESPIRATORY NEGATIVE: 1
GASTROINTESTINAL NEGATIVE: 1

## 2022-01-19 ENCOUNTER — OFFICE VISIT (OUTPATIENT)
Dept: INTERNAL MEDICINE CLINIC | Age: 57
End: 2022-01-19
Payer: COMMERCIAL

## 2022-01-19 VITALS
HEIGHT: 67 IN | WEIGHT: 278.6 LBS | DIASTOLIC BLOOD PRESSURE: 70 MMHG | SYSTOLIC BLOOD PRESSURE: 115 MMHG | OXYGEN SATURATION: 98 % | HEART RATE: 85 BPM | TEMPERATURE: 96.4 F | BODY MASS INDEX: 43.73 KG/M2

## 2022-01-19 DIAGNOSIS — R35.89 POLYURIA: ICD-10-CM

## 2022-01-19 DIAGNOSIS — R68.81 EARLY SATIETY: ICD-10-CM

## 2022-01-19 DIAGNOSIS — D70.8 OTHER NEUTROPENIA (HCC): ICD-10-CM

## 2022-01-19 DIAGNOSIS — Z00.00 PHYSICAL EXAM: ICD-10-CM

## 2022-01-19 DIAGNOSIS — N39.41 URGE INCONTINENCE OF URINE: ICD-10-CM

## 2022-01-19 DIAGNOSIS — M17.12 ARTHRITIS OF LEFT KNEE: Primary | ICD-10-CM

## 2022-01-19 PROCEDURE — 99214 OFFICE O/P EST MOD 30 MIN: CPT | Performed by: INTERNAL MEDICINE

## 2022-01-19 ASSESSMENT — PATIENT HEALTH QUESTIONNAIRE - PHQ9
2. FEELING DOWN, DEPRESSED OR HOPELESS: 0
SUM OF ALL RESPONSES TO PHQ QUESTIONS 1-9: 0
1. LITTLE INTEREST OR PLEASURE IN DOING THINGS: 0
SUM OF ALL RESPONSES TO PHQ9 QUESTIONS 1 & 2: 0
SUM OF ALL RESPONSES TO PHQ QUESTIONS 1-9: 0

## 2022-01-19 NOTE — PATIENT INSTRUCTIONS
[facility] Locations - No appointment necessary. Sites open Monday to Friday. @ indicates the location is open Saturdays. Call your preferred location for test preparation, business hours and other information you need. [employer] accepts [employer]. [facility]    @ [place] [facility] [address] [facility] [address] [place] [address]. [address]   Ph: [phone] [facility] [address]   Ph: [phone]  @ [facility] 3[address] [facility]   Ph: [phone]    [facility] [address]   Ph: [address] Lab Sv[facility]  Ph: [phone] [facility] Sv[facility]. [address] [address]   Ph: [phone]      [facility] 1801 [facility], 400 [facility]   Ph: [address] [facility] [address], 800 [facility]   Ph: [phone] [facility] S[facility] [address] [facility], [address] [place] [address]   Ph: [phone] [address] [facility]. Lab Svcs. 211 [facility], [address]   Ph: [address] Lab Svcs[address] [facility] [place] [address]   Ph: [phone] [address] [facility]. Lab Svcs.   54 [facility]   Ph: [phone]

## 2022-01-19 NOTE — PROGRESS NOTES
Patient: Sadia Waldron is a 55 y.o. female who presents today with the following Chief Complaint(s):    Chief Complaint   Patient presents with    Hypertension    Abdominal Pain         HPIShe is here for a physical exam. She complains of abdominal bloating. BM ok. No N/V. Has protuberant abdominal wall, muscle laxity. H/O hernia repair. She also complains of urine incontinence. If she has to urinate unable to stop it. No dysuria, or blood noted. Left knee pain and stiffness with h/o arthritis. Current Outpatient Medications   Medication Sig Dispense Refill    meloxicam (MOBIC) 7.5 MG tablet Take 1 tablet by mouth 2 times daily as needed for Pain 30 tablet 1    triamcinolone (KENALOG) 0.1 % cream APPLY TOPICALLY TO THE AFFECTED AREA TWICE DAILY      sodium oxybate (XYREM) 500 MG/ML SOLN solution Take by mouth 2 times daily as needed.  cyanocobalamin 1000 MCG tablet Take 1,000 mcg by mouth daily      terbinafine (LAMISIL) 250 MG tablet TAKE 1 TABLET BY MOUTH DAILY      sodium chloride (ALTAMIST SPRAY) 0.65 % nasal spray 1 spray by Nasal route as needed for Congestion 1 Bottle 3    ascorbic acid (VITAMIN C) 500 MG tablet Take 500 mg by mouth daily.  ibuprofen (ADVIL;MOTRIN) 800 MG tablet Take 1 tablet by mouth every 8 hours as needed for Pain 15 tablet 0     No current facility-administered medications for this visit. Patient's past medical history, surgical history, family history, medications,and allergies  were all reviewed and updated as appropriate today. Review of Systems   Constitutional: Negative. HENT: Negative. Eyes: Negative. Respiratory: Negative. Cardiovascular: Negative. Gastrointestinal: Negative for nausea and vomiting. See HPI. H/O early satiety. Genitourinary: Positive for frequency. Negative for dysuria and vaginal pain. See HPI. Musculoskeletal: Positive for arthralgias. See HPI.    Skin: Negative. Neurological: Negative. Hematological:        H/O neutropenia. Psychiatric/Behavioral: Negative. Physical Exam  Constitutional:       General: She is not in acute distress. Appearance: She is well-developed. HENT:      Head: Normocephalic and atraumatic. Right Ear: External ear normal.      Left Ear: External ear normal.      Nose: Nose normal.   Eyes:      General: No scleral icterus. Conjunctiva/sclera: Conjunctivae normal.      Pupils: Pupils are equal, round, and reactive to light. Neck:      Thyroid: No thyromegaly. Cardiovascular:      Rate and Rhythm: Normal rate and regular rhythm. Heart sounds: Normal heart sounds. Comments: H/O elevated b/p. Controlled without medication. Pulmonary:      Effort: Pulmonary effort is normal.      Breath sounds: Normal breath sounds. Abdominal:      General: Bowel sounds are normal. There is distension. Palpations: Abdomen is soft. There is no mass. Tenderness: There is no abdominal tenderness. There is no guarding or rebound. Musculoskeletal:      Cervical back: Normal range of motion and neck supple. Comments: Arthritic left knee with crepitance and limited extension. Lymphadenopathy:      Cervical: No cervical adenopathy. Skin:     General: Skin is warm and dry. Neurological:      Mental Status: She is alert and oriented to person, place, and time. Deep Tendon Reflexes: Reflexes are normal and symmetric. Psychiatric:         Behavior: Behavior normal.         Thought Content: Thought content normal.         Judgment: Judgment normal.         Vitals:    01/19/22 1158   BP: 115/70   Pulse: 85   Temp: 96.4 °F (35.8 °C)   SpO2: 98%       Assessment:   Diagnosis Orders   1. Arthritis of left knee  [address], Danisha Buckner MD, Orthopedic Surgery, [facility]  Exercises, diet, weight reduction discussed. 2. Polyuria  External Referral To Urology    URINALYSIS    Culture, Urine   3. Physical exam  Health and wellness advice given. Questions answered. Literature provided. COMPREHENSIVE METABOLIC PANEL    TSH with Reflex    Lipid Panel   4. Early satiety  [facility] - Minh Holley MD, Gastroenterology, [facility]   5. Urge incontinence of urine  External Referral To Urology    Culture, Urine. Urinalysis. 6. Other neutropenia (HCC)  CBC WITH AUTO DIFFERENTIAL  Discussed possible causes. Plan:  See plans above.

## 2022-02-05 ASSESSMENT — ENCOUNTER SYMPTOMS
RESPIRATORY NEGATIVE: 1
VOMITING: 0
EYES NEGATIVE: 1
NAUSEA: 0

## 2022-02-09 ENCOUNTER — OFFICE VISIT (OUTPATIENT)
Dept: ORTHOPEDIC SURGERY | Age: 57
End: 2022-02-09
Payer: COMMERCIAL

## 2022-02-09 VITALS — HEIGHT: 67 IN | WEIGHT: 278 LBS | BODY MASS INDEX: 43.63 KG/M2

## 2022-02-09 DIAGNOSIS — M25.461 PAIN AND SWELLING OF RIGHT KNEE: Primary | ICD-10-CM

## 2022-02-09 DIAGNOSIS — M25.561 PAIN AND SWELLING OF RIGHT KNEE: Primary | ICD-10-CM

## 2022-02-09 PROCEDURE — 99214 OFFICE O/P EST MOD 30 MIN: CPT | Performed by: ORTHOPAEDIC SURGERY

## 2022-02-09 NOTE — PROGRESS NOTES
Patient Name: Gayatri Rosa  : 1965  DOS: 2022        Chief Complaint:   Chief Complaint   Patient presents with    Knee Pain     RT KNEE-Last visit 21     Right knee with continued throbbing of the knee. History of Present Illness:  Gayatri Rosa is a 55 y.o. female who presents with a chief complaint of continued complaints of pain in the knee with irritation with walking, stairs and with overuse. Pain in the knee regularly with swelling and discomfort. Increase in pain over the past several months time. No obvious injury. No history of lupus/Ra    No additional joint issues. No exposure to woods/camping. Medical History  Current Medications:   Prior to Admission medications    Medication Sig Start Date End Date Taking? Authorizing Provider   meloxicam (MOBIC) 7.5 MG tablet Take 1 tablet by mouth 2 times daily as needed for Pain 10/20/21   Salinas Shah MD   triamcinolone (KENALOG) 0.1 % cream APPLY TOPICALLY TO THE AFFECTED AREA TWICE DAILY 21   Historical Provider, MD   sodium oxybate (XYREM) 500 MG/ML SOLN solution Take by mouth 2 times daily as needed. Historical Provider, MD   cyanocobalamin 1000 MCG tablet Take 1,000 mcg by mouth daily    Historical Provider, MD   terbinafine (LAMISIL) 250 MG tablet TAKE 1 TABLET BY MOUTH DAILY 21   Historical Provider, MD   sodium chloride (ALTAMIST SPRAY) 0.65 % nasal spray 1 spray by Nasal route as needed for Congestion 9/15/17   NICOLE Marino - CNP   ibuprofen (ADVIL;MOTRIN) 800 MG tablet Take 1 tablet by mouth every 8 hours as needed for Pain 17  Niya Linda MD   ascorbic acid (VITAMIN C) 500 MG tablet Take 500 mg by mouth daily. Historical Provider, MD     Allergies: No Known Allergies    Review of Systems:     Review of Systems ______  Constitutional: Negative for fever and diaphoresis. ____________  Respiratory: Negative for shortness of breath. ________  Gastrointestinal: Negative for abdominal pain. ________  Musculoskeletal: Positive for joint pain. ____  Skin: Negative for itching. ____  Neurological: Negative for loss of consciousness.  ______________  All other systems reviewed and negative     Past Medical History:   Diagnosis Date    Arrhythmia     pt reports intermittent arrhythmias -- Dr. Shantanu Blackwell is cardiologist    Cataplexy     Essential hypertension 9/15/2017    High blood pressure     Migraine     Narcolepsy     Unspecified sleep apnea     NO CPAP    Varicose veins of lower extremities 10/9/2011     Family History   Problem Relation Age of Onset    Diabetes Mother     High Blood Pressure Mother     Mental Illness Father     Arthritis Father     Rheum Arthritis Neg Hx     Osteoarthritis Neg Hx     Asthma Neg Hx     Breast Cancer Neg Hx     Cancer Neg Hx     Heart Failure Neg Hx     High Cholesterol Neg Hx     Hypertension Neg Hx     Migraines Neg Hx     Ovarian Cancer Neg Hx     Rashes/Skin Problems Neg Hx     Seizures Neg Hx     Stroke Neg Hx     Thyroid Disease Neg Hx      Social History     Socioeconomic History    Marital status:      Spouse name: Not on file    Number of children: Not on file    Years of education: Not on file    Highest education level: Not on file   Occupational History    Not on file   Tobacco Use    Smoking status: Never Smoker    Smokeless tobacco: Never Used   Vaping Use    Vaping Use: Never used   Substance and Sexual Activity    Alcohol use: No     Alcohol/week: 0.0 standard drinks    Drug use: No    Sexual activity: Yes     Partners: Male   Other Topics Concern    Not on file   Social History Narrative    Not on file     Social Determinants of Health     Financial Resource Strain:     Difficulty of Paying Living Expenses: Not on file   Food Insecurity:     Worried About Running Out of Food in the Last Year: Not on file    Piedad of Food in the Last Year: Not on file   Transportation Needs:     Lack of Transportation (Medical): Not on file    Lack of Transportation (Non-Medical): Not on file   Physical Activity:     Days of Exercise per Week: Not on file    Minutes of Exercise per Session: Not on file   Stress:     Feeling of Stress : Not on file   Social Connections:     Frequency of Communication with Friends and Family: Not on file    Frequency of Social Gatherings with Friends and Family: Not on file    Attends Scientology Services: Not on file    Active Member of [address] [place] [address] [employer] or Organizations: Not on file    Attends Club or Organization Meetings: Not on file    Marital Status: Not on file   Intimate Partner Violence:     Fear of Current or Ex-Partner: Not on file    Emotionally Abused: Not on file    Physically Abused: Not on file    Sexually Abused: Not on file   Housing Stability:     Unable to Pay for Housing in the Last Year: Not on file    Number of [place] in the Last Year: Not on file    Unstable Housing in the Last Year: Not on file         Physical Exam __  Constitutional: She is oriented to person, place, and time and well-developed, well-nourished, and in no distress. No distress. ____  HENT:   Head: Normocephalic and atraumatic. ____  Eyes: Conjunctivae are normal. ________  Cardiovascular: Intact distal pulses. ____  Pulmonary/Chest: Effort normal. ________________________  Neurological: She is alert and oriented to person, place, and time. ____  Skin: Skin is dry. She is not diaphoretic. ____  Psychiatric: Mood, affect and judgment normal. ______          Assessment   Vital Signs: There were no vitals filed for this visit.    right Knee shows evidence for no obvious pseudolaxity, pain with weight bearing, antalgic gait relatively mild    Inspection: Moderate anterior swelling. Swelling is present with  mild effusion. The posterior aspect of the knee appears to be full with tenderness. There is no erythema, rash, or ecchymosis. Range of Motion:  Right 0-120 left0-120     Pain with medial joint line and mild patellofemroal testing    There is deformitynone    Strength:  Hamstrings rated: 4/5. Quadriceps rated: 5/5    Palpation: There is moderate tenderness along the medial joint line. Special Tests: Patellar Compression test is mildly positive. Valgus & Varus test is positive. No obvious instability  Skin: There are no rashes, ulcerations or lesions. Gait: Gait pattern is antalgic  Skin shows no rashes/ecchymosis to the affected area, no hyperesthesias, no discoloration, no temperature or color discrepancies. NEUROLOGICALLY: There is no evidence for sensory or motor deficits in the extremity. Coordination appears full with no spacticity or rigidity. Reflexes appear to be symmetric. Distal circulation intact. No signs of RSD. Additional Comments:no hip pathology. Procedure(s): none    Diagnostic Test Findings:   Xray   Have reviewed the xrays above from 02/09/22   and my impression is: no major osteoarthritis changes. Assessment and Plan:       Diagnosis Orders   1. Pain and swelling of right knee                The orders below, if any, were placed during this visit:   No orders of the defined types were placed in this encounter. Treatment Plan:     I discussed the diagnosis of arthritis with the patient. We've discussed treatment options which would include anti-inflammatory medication, physical therapy, bracing, cortisone injections, and Visco supplementation. We have also discussed the benefits of low impact exercise and weight loss. Diclofenac. No physical therapy at this point as she has adequate strength and rom. .     Swelling control. Use ice on a regular basis to control pain inflammation and swelling.      Use of mobic at night and potentially bid       Gloria Gutierrez MD

## 2022-02-25 ENCOUNTER — TELEPHONE (OUTPATIENT)
Dept: ORTHOPEDIC SURGERY | Age: 57
End: 2022-02-25

## 2022-02-25 NOTE — TELEPHONE ENCOUNTER
I called the patient regarding her current insurance. She states that it is [employer], but she is having trouble with getting established with them. Once she gets it corrected she will call and let us know the correct information.

## 2022-03-03 ENCOUNTER — HOSPITAL ENCOUNTER (OUTPATIENT)
Dept: MRI IMAGING | Age: 57
Discharge: HOME | End: 2022-03-03

## 2022-03-03 DIAGNOSIS — M25.461 PAIN AND SWELLING OF RIGHT KNEE: ICD-10-CM

## 2022-03-03 DIAGNOSIS — Z00.00 PHYSICAL EXAM: ICD-10-CM

## 2022-03-03 DIAGNOSIS — R35.89 POLYURIA: ICD-10-CM

## 2022-03-03 DIAGNOSIS — N39.41 URGE INCONTINENCE OF URINE: ICD-10-CM

## 2022-03-03 DIAGNOSIS — D70.8 OTHER NEUTROPENIA (HCC): ICD-10-CM

## 2022-03-03 DIAGNOSIS — M25.561 PAIN AND SWELLING OF RIGHT KNEE: ICD-10-CM

## 2022-03-03 LAB
A/G RATIO: 1.6 (ref 1.1–2.2)
ALBUMIN SERPL-MCNC: 4 G/DL (ref 3.4–5)
ALP BLD-CCNC: 73 U/L (ref 40–129)
ALT SERPL-CCNC: 17 U/L (ref 10–40)
ANION GAP SERPL CALCULATED.3IONS-SCNC: 11 MMOL/L (ref 3–16)
AST SERPL-CCNC: 11 U/L (ref 15–37)
BASOPHILS ABSOLUTE: 0 K/UL (ref 0–0.2)
BASOPHILS RELATIVE PERCENT: 0.8 %
BILIRUB SERPL-MCNC: <0.2 MG/DL (ref 0–1)
BUN BLDV-MCNC: 13 MG/DL (ref 7–20)
CALCIUM SERPL-MCNC: 9.6 MG/DL (ref 8.3–10.6)
CHLORIDE BLD-SCNC: 106 MMOL/L (ref 99–110)
CHOLESTEROL, TOTAL: 208 MG/DL (ref 0–199)
CO2: 25 MMOL/L (ref 21–32)
CREAT SERPL-MCNC: 0.7 MG/DL (ref 0.6–1.1)
EOSINOPHILS ABSOLUTE: 0.1 K/UL (ref 0–0.6)
EOSINOPHILS RELATIVE PERCENT: 3.6 %
GFR AFRICAN AMERICAN: >60
GFR NON-AFRICAN AMERICAN: >60
GLUCOSE BLD-MCNC: 94 MG/DL (ref 70–99)
HCT VFR BLD CALC: 42.1 % (ref 36–48)
HDLC SERPL-MCNC: 52 MG/DL (ref 40–60)
HEMOGLOBIN: 14 G/DL (ref 12–16)
LDL CHOLESTEROL CALCULATED: 144 MG/DL
LYMPHOCYTES ABSOLUTE: 1.7 K/UL (ref 1–5.1)
LYMPHOCYTES RELATIVE PERCENT: 51.4 %
MCH RBC QN AUTO: 30.9 PG (ref 26–34)
MCHC RBC AUTO-ENTMCNC: 33.2 G/DL (ref 31–36)
MCV RBC AUTO: 93.2 FL (ref 80–100)
MONOCYTES ABSOLUTE: 0.3 K/UL (ref 0–1.3)
MONOCYTES RELATIVE PERCENT: 8.5 %
NEUTROPHILS ABSOLUTE: 1.2 K/UL (ref 1.7–7.7)
NEUTROPHILS RELATIVE PERCENT: 35.7 %
PDW BLD-RTO: 13.3 % (ref 12.4–15.4)
PLATELET # BLD: 226 K/UL (ref 135–450)
PMV BLD AUTO: 9 FL (ref 5–10.5)
POTASSIUM SERPL-SCNC: 4.7 MMOL/L (ref 3.5–5.1)
RBC # BLD: 4.52 M/UL (ref 4–5.2)
SODIUM BLD-SCNC: 142 MMOL/L (ref 136–145)
TOTAL PROTEIN: 6.5 G/DL (ref 6.4–8.2)
TRIGL SERPL-MCNC: 58 MG/DL (ref 0–150)
TSH REFLEX: 2.09 UIU/ML (ref 0.27–4.2)
VLDLC SERPL CALC-MCNC: 12 MG/DL
WBC # BLD: 3.3 K/UL (ref 4–11)

## 2022-03-04 ENCOUNTER — HOSPITAL ENCOUNTER (OUTPATIENT)
Dept: CT IMAGING | Age: 57
Discharge: HOME | End: 2022-03-04
Payer: COMMERCIAL

## 2022-03-04 DIAGNOSIS — R19.00 ABDOMINAL SWELLING: ICD-10-CM

## 2022-03-04 LAB — URINE CULTURE, ROUTINE: NORMAL

## 2022-03-04 PROCEDURE — 6360000004 HC RX CONTRAST MEDICATION: Performed by: INTERNAL MEDICINE

## 2022-03-04 PROCEDURE — 74177 CT ABD & PELVIS W/CONTRAST: CPT

## 2022-03-04 RX ADMIN — IOPAMIDOL 75 ML: 755 INJECTION, SOLUTION INTRAVENOUS at 08:23

## 2022-03-18 ENCOUNTER — OFFICE VISIT (OUTPATIENT)
Dept: SURGERY | Age: 57
End: 2022-03-18
Payer: COMMERCIAL

## 2022-03-18 VITALS
HEIGHT: 67 IN | TEMPERATURE: 97.6 F | DIASTOLIC BLOOD PRESSURE: 91 MMHG | SYSTOLIC BLOOD PRESSURE: 137 MMHG | HEART RATE: 82 BPM | WEIGHT: 280.4 LBS | BODY MASS INDEX: 44.01 KG/M2

## 2022-03-18 DIAGNOSIS — K43.2 INCISIONAL HERNIA, WITHOUT OBSTRUCTION OR GANGRENE: Primary | ICD-10-CM

## 2022-03-18 PROCEDURE — 99214 OFFICE O/P EST MOD 30 MIN: CPT | Performed by: SURGERY

## 2022-03-21 ENCOUNTER — TELEPHONE (OUTPATIENT)
Dept: ORTHOPEDIC SURGERY | Age: 57
End: 2022-03-21

## 2022-03-21 NOTE — TELEPHONE ENCOUNTER
I spoke with the patient and she believes that the insurance is good to go now. I will have pre-cert run the MRI through again. LM on 3/22 that her MRI has been authorized.

## 2022-03-22 ENCOUNTER — TELEPHONE (OUTPATIENT)
Dept: SURGERY | Age: 57
End: 2022-03-22

## 2022-03-25 ENCOUNTER — HOSPITAL ENCOUNTER (OUTPATIENT)
Dept: MRI IMAGING | Age: 57
Discharge: HOME | End: 2022-03-25
Payer: COMMERCIAL

## 2022-03-25 DIAGNOSIS — M25.561 RIGHT KNEE PAIN, UNSPECIFIED CHRONICITY: ICD-10-CM

## 2022-03-25 PROCEDURE — 73721 MRI JNT OF LWR EXTRE W/O DYE: CPT

## 2022-03-28 NOTE — PROGRESS NOTES
drinks    Drug use: No    Sexual activity: Yes     Partners: Male   Other Topics Concern    Not on file   Social History Narrative    Not on file     Social Determinants of Health     Financial Resource Strain:     Difficulty of Paying Living Expenses: Not on file   Food Insecurity:     Worried About Running Out of Food in the Last Year: Not on file    Piedad of Food in the Last Year: Not on file   Transportation Needs:     Lack of Transportation (Medical): Not on file    Lack of Transportation (Non-Medical):  Not on file   Physical Activity:     Days of Exercise per Week: Not on file    Minutes of Exercise per Session: Not on file   Stress:     Feeling of Stress : Not on file   Social Connections:     Frequency of Communication with Friends and Family: Not on file    Frequency of Social Gatherings with Friends and Family: Not on file    Attends Rastafarian Services: Not on file    Active Member of [address] [place] [address] or Organizations: Not on file    Attends Club or Organization Meetings: Not on file    Marital Status: Not on file   Intimate Partner Violence:     Fear of Current or Ex-Partner: Not on file    Emotionally Abused: Not on file    Physically Abused: Not on file    Sexually Abused: Not on file   Housing Stability:     Unable to Pay for Housing in the Last Year: Not on file    Number of [place] in the Last Year: Not on file    Unstable Housing in the Last Year: Not on file       Allergy: No Known Allergies    PHYSICAL EXAM:  VITALS:  BP (!) 137/91   Pulse 82   Temp 97.6 °F (36.4 °C)   Ht 5' 7\" (1.702 m)   Wt 280 lb 6.4 oz (127.2 kg)   BMI 43.92 kg/m²     CONSTITUTIONAL:  alert, no apparent distress and moderately obese  EYES:  sclera clear  ENT:  normocepalic, without obvious abnormality  NECK:  supple, symmetrical, trachea midline and no carotid bruits  LUNGS:  clear to auscultation  CARDIOVASCULAR:  regular rate and rhythm and no murmur noted  ABDOMEN:  scars noted umbilical, normal bowel sounds, soft, non-distended, non-tender, voluntary guarding absent, no masses palpated and hernia present at the umbilicus  MUSCULOSKELETAL:  0+ pitting edema lower extremities  NEUROLOGIC:  Mental Status Exam:  Level of Alertness:   awake  Orientation:   person, place, time  SKIN:  no bruising or bleeding and normal skin color, texture, turgor    DATA:    Radiology Review:    Organs: The liver, spleen, pancreas, adrenal glands and kidneys are   unremarkable.  No change in the too small to characterize low attenuating   lesion within the right hepatic lobe favoring a benign cyst.       GI/Bowel: There is no bowel obstruction.  The appendix is within normal   limits.       Pelvis: There is a fibroid uterus.       Peritoneum/Retroperitoneum: There is no evidence of free fluid or adenopathy.       There is a new large ventral hernia containing multiple loops of unobstructed   small bowel, likely incisional.  There is approximately 4.7 cm of diastasis   involving the rectus abdominus muscles.       Bones/Soft Tissues: Degenerative changes involve the thoracolumbar spine.  No   change in the grade 1 anterolisthesis of L4 on L5. IMPRESSION/RECOMMENDATIONS:    Patient with a symptomatic recurrent incisional hernia for which I have recommended repair. We discussed the surgical options and have elected to proceed with open repair. We discussed her weight and have elected to pursue weight loss before proceeding with repair. She will return to the office in 2 months or unless she develops increasing symptoms.      Lane Glasgow MD

## 2022-04-22 ENCOUNTER — TELEPHONE (OUTPATIENT)
Dept: SURGERY | Age: 57
End: 2022-04-22

## 2022-05-02 ENCOUNTER — OFFICE VISIT (OUTPATIENT)
Dept: SURGERY | Age: 57
End: 2022-05-02
Payer: COMMERCIAL

## 2022-05-02 VITALS
HEIGHT: 67 IN | WEIGHT: 282.6 LBS | BODY MASS INDEX: 44.36 KG/M2 | SYSTOLIC BLOOD PRESSURE: 127 MMHG | HEART RATE: 84 BPM | DIASTOLIC BLOOD PRESSURE: 88 MMHG

## 2022-05-02 DIAGNOSIS — K43.2 INCISIONAL HERNIA, WITHOUT OBSTRUCTION OR GANGRENE: Primary | ICD-10-CM

## 2022-05-02 PROCEDURE — 99213 OFFICE O/P EST LOW 20 MIN: CPT | Performed by: SURGERY

## 2022-05-11 ENCOUNTER — OFFICE VISIT (OUTPATIENT)
Dept: INTERNAL MEDICINE CLINIC | Age: 57
End: 2022-05-11
Payer: COMMERCIAL

## 2022-05-11 VITALS
DIASTOLIC BLOOD PRESSURE: 70 MMHG | WEIGHT: 283.8 LBS | BODY MASS INDEX: 44.54 KG/M2 | SYSTOLIC BLOOD PRESSURE: 128 MMHG | HEIGHT: 67 IN | OXYGEN SATURATION: 98 % | TEMPERATURE: 96.3 F | HEART RATE: 100 BPM

## 2022-05-11 DIAGNOSIS — Z12.31 ENCOUNTER FOR SCREENING MAMMOGRAM FOR BREAST CANCER: Primary | ICD-10-CM

## 2022-05-11 DIAGNOSIS — K46.9 RECURRENT HERNIA: ICD-10-CM

## 2022-05-11 DIAGNOSIS — M17.12 ARTHRITIS OF LEFT KNEE: ICD-10-CM

## 2022-05-11 PROCEDURE — 99214 OFFICE O/P EST MOD 30 MIN: CPT | Performed by: INTERNAL MEDICINE

## 2022-05-11 SDOH — ECONOMIC STABILITY: FOOD INSECURITY: WITHIN THE PAST 12 MONTHS, YOU WORRIED THAT YOUR FOOD WOULD RUN OUT BEFORE YOU GOT MONEY TO BUY MORE.: NEVER TRUE

## 2022-05-11 SDOH — ECONOMIC STABILITY: FOOD INSECURITY: WITHIN THE PAST 12 MONTHS, THE FOOD YOU BOUGHT JUST DIDN'T LAST AND YOU DIDN'T HAVE MONEY TO GET MORE.: NEVER TRUE

## 2022-05-11 ASSESSMENT — ANXIETY QUESTIONNAIRES
6. BECOMING EASILY ANNOYED OR IRRITABLE: 0
IF YOU CHECKED OFF ANY PROBLEMS ON THIS QUESTIONNAIRE, HOW DIFFICULT HAVE THESE PROBLEMS MADE IT FOR YOU TO DO YOUR WORK, TAKE CARE OF THINGS AT HOME, OR GET ALONG WITH OTHER PEOPLE: NOT DIFFICULT AT ALL
1. FEELING NERVOUS, ANXIOUS, OR ON EDGE: 0
5. BEING SO RESTLESS THAT IT IS HARD TO SIT STILL: 0
2. NOT BEING ABLE TO STOP OR CONTROL WORRYING: 0
GAD7 TOTAL SCORE: 0
7. FEELING AFRAID AS IF SOMETHING AWFUL MIGHT HAPPEN: 0
3. WORRYING TOO MUCH ABOUT DIFFERENT THINGS: 0
4. TROUBLE RELAXING: 0

## 2022-05-11 ASSESSMENT — PATIENT HEALTH QUESTIONNAIRE - PHQ9
SUM OF ALL RESPONSES TO PHQ QUESTIONS 1-9: 0
1. LITTLE INTEREST OR PLEASURE IN DOING THINGS: 0
SUM OF ALL RESPONSES TO PHQ QUESTIONS 1-9: 0
SUM OF ALL RESPONSES TO PHQ9 QUESTIONS 1 & 2: 0
SUM OF ALL RESPONSES TO PHQ QUESTIONS 1-9: 0
SUM OF ALL RESPONSES TO PHQ QUESTIONS 1-9: 0
2. FEELING DOWN, DEPRESSED OR HOPELESS: 0

## 2022-05-11 ASSESSMENT — SOCIAL DETERMINANTS OF HEALTH (SDOH): HOW HARD IS IT FOR YOU TO PAY FOR THE VERY BASICS LIKE FOOD, HOUSING, MEDICAL CARE, AND HEATING?: NOT HARD AT ALL

## 2022-05-11 NOTE — PROGRESS NOTES
Patient: Lupe Roberts is a 55 y.o. female who presents today with the following Chief Complaint(s):    Chief Complaint   Patient presents with    Follow-up         HPI will need repeat hernia surgery. Also \  Right knee arthtris, meniscus tear. Given anti inflammatory including ice. Left medial ankle hyperpigmented rash. bx suggested. Irregularly shaped. Satellite circumscribed lesions noted. Current Outpatient Medications   Medication Sig Dispense Refill    diclofenac sodium (VOLTAREN) 1 % GEL Apply 4 g topically 4 times daily 350 g 1    meloxicam (MOBIC) 7.5 MG tablet Take 1 tablet by mouth 2 times daily as needed for Pain 30 tablet 1    triamcinolone (KENALOG) 0.1 % cream APPLY TOPICALLY TO THE AFFECTED AREA TWICE DAILY      sodium oxybate (XYREM) 500 MG/ML SOLN solution Take by mouth 2 times daily as needed.  cyanocobalamin 1000 MCG tablet Take 1,000 mcg by mouth daily      terbinafine (LAMISIL) 250 MG tablet TAKE 1 TABLET BY MOUTH DAILY      sodium chloride (ALTAMIST SPRAY) 0.65 % nasal spray 1 spray by Nasal route as needed for Congestion 1 Bottle 3    ascorbic acid (VITAMIN C) 500 MG tablet Take 500 mg by mouth daily.  ibuprofen (ADVIL;MOTRIN) 800 MG tablet Take 1 tablet by mouth every 8 hours as needed for Pain 15 tablet 0     No current facility-administered medications for this visit. Patient's past medical history, surgical history, family history, medications,and allergies  were all reviewed and updated as appropriate today. Review of Systems      Physical Exam    Vitals:    05/11/22 1419   BP: 128/70   Pulse: 100   Temp: 96.3 °F (35.7 °C)   SpO2: 98%       Assessment:  Encounter Diagnosis   Name Primary?  Encounter for screening mammogram for breast cancer Yes       Plan:  1.  Encounter for screening mammogram for breast cancer  ***

## 2022-05-11 NOTE — PATIENT INSTRUCTIONS
[facility] Locations - No appointment necessary. @ indicates the location is open Saturdays in addition to Monday through Friday. Call your preferred location for test preparation, business hours and other information you need. [facility] accepts [employer]. [facility]    @ [place] [facility] [address]   Ph: [phone] [facility] Lab Svcs. [address]   Ph: [phone]  @ [place] [facility] [address] [facility]   Ph: [phone]    [facility] S[facility] [address] [facility] [place], [address]   Ph: [phone] @ [place] [facility] [address]  Ph: [phone] @ [facility] Lab Svcs. [address] Matthews [facility]   Ph: [phone]    [place]   @ [facility] [address] [facility] [address] [place] [address]   Ph: [phone] [facility]. [address] [place] [address]  Ph: [id number] [place] [address] 30:  [address] Lab Svcs[address] [facility]   Ph: [phone] [facility]. Lab Svcs.   [address]   Ph: [phone]

## 2022-05-17 NOTE — PROGRESS NOTES
PATIENT NAME: Kristy Mackay     YOB: 1965     TODAY'S DATE: 10/18/2019    Reason for Visit:  Recurrent incisional hernia    Requesting Physician:  Dr. Caroline Lau:              The patient is a 55 y.o. female with a PMHx as delineated below who presents with a a bulge above her umbilicus that has been noted for some time. This has increased in size and more recently has been associated with discomfort with activities. No obstructive complaints.     Chief Complaint   Patient presents with    Follow-up     Ventral hernia        REVIEW OF SYSTEMS:  CONSTITUTIONAL:  negative  HEENT:  negative  RESPIRATORY:  negative  CARDIOVASCULAR:  negative  GASTROINTESTINAL:  negative except for abdominal pain  GENITOURINARY:  negative  HEMATOLOGIC/LYMPHATIC:  negative  NEUROLOGICAL:  negative    PMH  Past Medical History:   Diagnosis Date    Arrhythmia     pt reports intermittent arrhythmias -- Dr. Andrew Cherry is cardiologist    Cataplexy     Essential hypertension 9/15/2017    High blood pressure     Migraine     Narcolepsy     Unspecified sleep apnea     NO CPAP    Varicose veins of lower extremities 10/9/2011       PSH  Past Surgical History:   Procedure Laterality Date    COLONOSCOPY      ENDOMETRIAL ABLATION  06/2009    TUBAL LIGATION      tubes tied 13 yrs ago -- 2001    VENTRAL HERNIA REPAIR N/A 12/3/2019    OPEN INCISIONAL HERNIA REPAIR WITH UMBILECTOMY performed by Max Guerrero MD at [address] [facility] [address] History     Socioeconomic History    Marital status:      Spouse name: Not on file    Number of children: Not on file    Years of education: Not on file    Highest education level: Not on file   Occupational History    Not on file   Tobacco Use    Smoking status: Never Smoker    Smokeless tobacco: Never Used   Vaping Use    Vaping Use: Never used   Substance and Sexual Activity    Alcohol use: No     Alcohol/week: 0.0 standard drinks    Drug use: No    Sexual activity: Yes     Partners: Male   Other Topics Concern    Not on file   Social History Narrative    Not on file     Social Determinants of Health     Financial Resource Strain: Low Risk     Difficulty of Paying Living Expenses: Not hard at all   Food Insecurity: No Food Insecurity    Worried About Running Out of Food in the Last Year: Never true    [address] in the Last Year: Never true   Transportation Needs:     Lack of Transportation (Medical): Not on file    Lack of Transportation (Non-Medical):  Not on file   Physical Activity:     Days of Exercise per Week: Not on file    Minutes of Exercise per Session: Not on file   Stress:     Feeling of Stress : Not on file   Social Connections:     Frequency of Communication with Friends and Family: Not on file    Frequency of Social Gatherings with Friends and Family: Not on file    Attends Yazdanism Services: Not on file    Active Member of [address] [place] [address] or Organizations: Not on file    Attends Club or Organization Meetings: Not on file    Marital Status: Not on file   Intimate Partner Violence:     Fear of Current or Ex-Partner: Not on file    Emotionally Abused: Not on file    Physically Abused: Not on file    Sexually Abused: Not on file   Housing Stability:     Unable to Pay for Housing in the Last Year: Not on file    Number of [place] in the Last Year: Not on file    Unstable Housing in the Last Year: Not on file       Allergy: No Known Allergies    PHYSICAL EXAM:  VITALS:  /88   Pulse 84   Ht 5' 7\" (1.702 m)   Wt 282 lb 9.6 oz (128.2 kg)   BMI 44.26 kg/m²     CONSTITUTIONAL:  alert, no apparent distress and moderately obese  EYES:  sclera clear  ENT:  normocepalic, without obvious abnormality  NECK:  supple, symmetrical, trachea midline and no carotid bruits  LUNGS:  clear to auscultation  CARDIOVASCULAR:  regular rate and rhythm and no murmur noted  ABDOMEN:  scars noted umbilical, normal bowel sounds, soft, non-distended, non-tender, voluntary guarding absent, no masses palpated and hernia present at the umbilicus  MUSCULOSKELETAL:  0+ pitting edema lower extremities  NEUROLOGIC:  Mental Status Exam:  Level of Alertness:   awake  Orientation:   person, place, time  SKIN:  no bruising or bleeding and normal skin color, texture, turgor    DATA:    Radiology Review:    Organs: The liver, spleen, pancreas, adrenal glands and kidneys are   unremarkable.  No change in the too small to characterize low attenuating   lesion within the right hepatic lobe favoring a benign cyst.       GI/Bowel: There is no bowel obstruction.  The appendix is within normal   limits.       Pelvis: There is a fibroid uterus.       Peritoneum/Retroperitoneum: There is no evidence of free fluid or adenopathy.       There is a new large ventral hernia containing multiple loops of unobstructed   small bowel, likely incisional.  There is approximately 4.7 cm of diastasis   involving the rectus abdominus muscles.       Bones/Soft Tissues: Degenerative changes involve the thoracolumbar spine.  No   change in the grade 1 anterolisthesis of L4 on L5. IMPRESSION/RECOMMENDATIONS:    Patient with a symptomatic recurrent incisional hernia for which I have recommended repair. We discussed the surgical options and have elected to proceed with open repair. We discussed her weight and have elected to pursue weight loss before proceeding with repair. She will return to the office in 2 months or unless she develops increasing symptoms.      Kelly Turk MD

## 2022-06-03 ENCOUNTER — OFFICE VISIT (OUTPATIENT)
Dept: SURGERY | Age: 57
End: 2022-06-03
Payer: COMMERCIAL

## 2022-06-03 VITALS
SYSTOLIC BLOOD PRESSURE: 143 MMHG | HEIGHT: 67 IN | HEART RATE: 68 BPM | BODY MASS INDEX: 44.39 KG/M2 | TEMPERATURE: 97.2 F | WEIGHT: 282.8 LBS | DIASTOLIC BLOOD PRESSURE: 95 MMHG

## 2022-06-03 DIAGNOSIS — K43.2 RECURRENT INCISIONAL HERNIA: Primary | ICD-10-CM

## 2022-06-03 PROCEDURE — 99213 OFFICE O/P EST LOW 20 MIN: CPT | Performed by: SURGERY

## 2022-06-03 NOTE — LETTER
P - [facility] M.D. [facility]  [address] [place] [address]  Phone: [phone] Fax: [phone]            Surgery Order/Time:  6/3/22/11:05 AM  Conf. # _________________  Scheduled by: Jessica Osei Lpn                                **Pre-Surgical Orders in [facility]**  [facility].    Surgery Date: November 3 2022 Time: 730am    Pt arrival: [place]  Second Surgeon: N/A        Patient Name: Melissa Shannon  : 1965  Home [id number] (home)  [facility] [facility] [address]  PCP:  Nataliya Rondon MD   Does patient speak English?: yes    needed? no                                             PROCEDURE: Open repair of recurrent incisional hernia  CPT: 03184: Repair, recurrent incisional or ventral hernia   DIAGNOSIS:      ICD-10-CM    1. Recurrent incisional hernia  K43.2        Anesthesia: General  Time Needed:  2.5 hours   Pt Position:  supine      23 hr Observation   Cardiac Clearance: no  Patient to meet with Anesthesiology prior to surgery: no    Patient Allergies: No Known Allergies  Pre-Op H&P to be done by: Nataliya Rondon MD  Pre-Op Antibiotics: Ancef: 2g IV On Call to OR      Physician: Danielle Mccarty MD Date: 22             Insurance:     ID #      Ph #   Date called ________________   Nori Booty to: _____________ Precert Needed?  Yes  /  No  PreAuth # & Details   ______________________________________________________________________

## 2022-06-06 ENCOUNTER — TELEPHONE (OUTPATIENT)
Dept: SURGERY | Age: 57
End: 2022-06-06

## 2022-06-10 ASSESSMENT — ENCOUNTER SYMPTOMS
VOMITING: 0
RESPIRATORY NEGATIVE: 1
EYES NEGATIVE: 1
NAUSEA: 0

## 2022-06-11 NOTE — PROGRESS NOTES
Patient: Marry Duron is a 55 y.o. female who presents today with the following Chief Complaint(s):    Chief Complaint   Patient presents with    Follow-up         HPIShe is here for a physical exam. She complains of abdominal bloating. BM ok. No N/V. Has protuberant abdominal wall, muscle laxity. H/O hernia repair. Recurrence of hernia. Will need repeat surgery. Left knee pain and stiffness with h/o arthritis. MRI reveals medial meniscus and possible lateral meniscus tear. Has ortho f/u. Current Outpatient Medications   Medication Sig Dispense Refill    diclofenac sodium (VOLTAREN) 1 % GEL Apply 4 g topically 4 times daily 350 g 1    meloxicam (MOBIC) 7.5 MG tablet Take 1 tablet by mouth 2 times daily as needed for Pain 30 tablet 1    triamcinolone (KENALOG) 0.1 % cream APPLY TOPICALLY TO THE AFFECTED AREA TWICE DAILY      sodium oxybate (XYREM) 500 MG/ML SOLN solution Take by mouth 2 times daily as needed.  cyanocobalamin 1000 MCG tablet Take 1,000 mcg by mouth daily      terbinafine (LAMISIL) 250 MG tablet TAKE 1 TABLET BY MOUTH DAILY      sodium chloride (ALTAMIST SPRAY) 0.65 % nasal spray 1 spray by Nasal route as needed for Congestion 1 Bottle 3    ascorbic acid (VITAMIN C) 500 MG tablet Take 500 mg by mouth daily.  ibuprofen (ADVIL;MOTRIN) 800 MG tablet Take 1 tablet by mouth every 8 hours as needed for Pain 15 tablet 0     No current facility-administered medications for this visit. Patient's past medical history, surgical history, family history, medications,and allergies  were all reviewed and updated as appropriate today. Review of Systems   Constitutional: Negative. HENT: Negative. Eyes: Negative. Respiratory: Negative. Cardiovascular: Negative. Gastrointestinal: Negative for nausea and vomiting. See HPI. H/O early satiety. Musculoskeletal: Positive for arthralgias. See HPI. Skin: Negative.     Neurological: Negative. Psychiatric/Behavioral: Negative. Physical Exam  Constitutional:       General: She is not in acute distress. Appearance: She is well-developed. HENT:      Head: Normocephalic and atraumatic. Right Ear: External ear normal.      Left Ear: External ear normal.      Nose: Nose normal.   Eyes:      General: No scleral icterus. Conjunctiva/sclera: Conjunctivae normal.      Pupils: Pupils are equal, round, and reactive to light. Neck:      Thyroid: No thyromegaly. Cardiovascular:      Rate and Rhythm: Normal rate and regular rhythm. Heart sounds: Normal heart sounds. Comments: H/O elevated b/p. Controlled without medication. Pulmonary:      Effort: Pulmonary effort is normal.      Breath sounds: Normal breath sounds. Abdominal:      General: Bowel sounds are normal. There is distension. Palpations: Abdomen is soft. There is no mass. Tenderness: There is no abdominal tenderness. There is no guarding or rebound. Musculoskeletal:      Cervical back: Normal range of motion and neck supple. Comments: Arthritic left knee with crepitance and limited extension. Lymphadenopathy:      Cervical: No cervical adenopathy. Skin:     General: Skin is warm and dry. Neurological:      Mental Status: She is alert and oriented to person, place, and time. Deep Tendon Reflexes: Reflexes are normal and symmetric. Psychiatric:         Behavior: Behavior normal.         Thought Content: Thought content normal.         Judgment: Judgment normal.         Vitals:    05/11/22 1419   BP: 128/70   Pulse: 100   Temp: 96.3 °F (35.7 °C)   SpO2: 98%       Assessment:   Diagnosis Orders   1. Arthritis of left knee  [address], Yajaira Siddiqui MD, Orthopedic Surgery, [facility]  Exercises, diet, weight reduction discussed. 2. Recurrent hernia Surgery plannedBMI           3. BMI: 44.3. Lower calories and regular physical activity discussed.            4. Mammogram  To be scheduled. Plan:  See plans above.

## 2022-06-17 ENCOUNTER — TELEPHONE (OUTPATIENT)
Dept: OTHER | Facility: CLINIC | Age: 57
End: 2022-06-17

## 2022-06-17 NOTE — TELEPHONE ENCOUNTER
Lupe Roberts, Was contacted today as part of [facility] to schedule a mammogram.         After speaking with patient she  has not had a recent mammogram.  Reminded patient that they have an open order from  Wyatt Fields MD and educated on the importance of routine breast cancer screenings. Pt states an understanding and requests to be scheduled out into July.  Mammogram scheduled for 7/21/22 at [facility] [address]

## 2022-06-17 NOTE — TELEPHONE ENCOUNTER
Patient seen on 6/3/22 surgery letter received Open repair of recurrent incisional hernia 2.5he OR time needed under general     Pre op instructions reviewed ncluding the need for a H&P with a EXG  Pre op packet [email or web address] op appt scheduled     Faxed to scheduling     Placed on calender and outlook

## 2022-06-28 NOTE — PROGRESS NOTES
Alcohol/week: 0.0 standard drinks    Drug use: No    Sexual activity: Yes     Partners: Male   Other Topics Concern    Not on file   Social History Narrative    Not on file     Social Determinants of Health     Financial Resource Strain: Low Risk     Difficulty of Paying Living Expenses: Not hard at all   Food Insecurity: No Food Insecurity    Worried About Running Out of Food in the Last Year: Never true    [address] in the Last Year: Never true   Transportation Needs:     Lack of Transportation (Medical): Not on file    Lack of Transportation (Non-Medical):  Not on file   Physical Activity:     Days of Exercise per Week: Not on file    Minutes of Exercise per Session: Not on file   Stress:     Feeling of Stress : Not on file   Social Connections:     Frequency of Communication with Friends and Family: Not on file    Frequency of Social Gatherings with Friends and Family: Not on file    Attends Worship Services: Not on file    Active Member of [address] [place] [address] or Organizations: Not on file    Attends Club or Organization Meetings: Not on file    Marital Status: Not on file   Intimate Partner Violence:     Fear of Current or Ex-Partner: Not on file    Emotionally Abused: Not on file    Physically Abused: Not on file    Sexually Abused: Not on file   Housing Stability:     Unable to Pay for Housing in the Last Year: Not on file    Number of [place] in the Last Year: Not on file    Unstable Housing in the Last Year: Not on file       Allergy: No Known Allergies    PHYSICAL EXAM:  VITALS:  BP (!) 143/95   Pulse 68   Temp 97.2 °F (36.2 °C)   Ht 5' 7\" (1.702 m)   Wt 282 lb 12.8 oz (128.3 kg)   BMI 44.29 kg/m²     CONSTITUTIONAL:  alert, no apparent distress and moderately obese  EYES:  sclera clear  ENT:  normocepalic, without obvious abnormality  NECK:  supple, symmetrical, trachea midline and no carotid bruits  LUNGS:  clear to auscultation  CARDIOVASCULAR:  regular rate and rhythm and no murmur noted  ABDOMEN:  scars noted umbilical, normal bowel sounds, soft, non-distended, non-tender, voluntary guarding absent, no masses palpated and hernia present at the umbilicus  MUSCULOSKELETAL:  0+ pitting edema lower extremities  NEUROLOGIC:  Mental Status Exam:  Level of Alertness:   awake  Orientation:   person, place, time  SKIN:  no bruising or bleeding and normal skin color, texture, turgor    DATA:    Radiology Review:    Organs: The liver, spleen, pancreas, adrenal glands and kidneys are   unremarkable.  No change in the too small to characterize low attenuating   lesion within the right hepatic lobe favoring a benign cyst.       GI/Bowel: There is no bowel obstruction.  The appendix is within normal   limits.       Pelvis: There is a fibroid uterus.       Peritoneum/Retroperitoneum: There is no evidence of free fluid or adenopathy.       There is a new large ventral hernia containing multiple loops of unobstructed   small bowel, likely incisional.  There is approximately 4.7 cm of diastasis   involving the rectus abdominus muscles.       Bones/Soft Tissues: Degenerative changes involve the thoracolumbar spine.  No   change in the grade 1 anterolisthesis of L4 on L5. IMPRESSION/RECOMMENDATIONS:    Patient with a symptomatic recurrent incisional hernia for which I have recommended repair. We discussed the surgical options and have elected to proceed with open repair. We discussed her weight and have elected to pursue weight loss before proceeding with repair. She will return to the office in 2 months or unless she develops increasing symptoms.      Maria Isabel Adkins MD

## 2022-08-11 ENCOUNTER — TELEPHONE (OUTPATIENT)
Dept: SURGERY | Age: 57
End: 2022-08-11

## 2022-08-11 ENCOUNTER — OFFICE VISIT (OUTPATIENT)
Dept: INTERNAL MEDICINE CLINIC | Age: 57
End: 2022-08-11
Payer: COMMERCIAL

## 2022-08-11 VITALS
TEMPERATURE: 96.7 F | HEIGHT: 67 IN | BODY MASS INDEX: 44.73 KG/M2 | OXYGEN SATURATION: 98 % | WEIGHT: 285 LBS | SYSTOLIC BLOOD PRESSURE: 122 MMHG | HEART RATE: 70 BPM | DIASTOLIC BLOOD PRESSURE: 80 MMHG

## 2022-08-11 DIAGNOSIS — R73.02 IGT (IMPAIRED GLUCOSE TOLERANCE): ICD-10-CM

## 2022-08-11 DIAGNOSIS — Z01.818 PRE-OP EVALUATION: Primary | ICD-10-CM

## 2022-08-11 DIAGNOSIS — K43.2 INCISIONAL HERNIA, WITHOUT OBSTRUCTION OR GANGRENE: ICD-10-CM

## 2022-08-11 LAB
CHP ED QC CHECK: NORMAL
GLUCOSE BLD-MCNC: 115 MG/DL
HBA1C MFR BLD: 5.7 %

## 2022-08-11 PROCEDURE — 83036 HEMOGLOBIN GLYCOSYLATED A1C: CPT | Performed by: INTERNAL MEDICINE

## 2022-08-11 PROCEDURE — 82962 GLUCOSE BLOOD TEST: CPT | Performed by: INTERNAL MEDICINE

## 2022-08-11 PROCEDURE — 99214 OFFICE O/P EST MOD 30 MIN: CPT | Performed by: INTERNAL MEDICINE

## 2022-08-11 PROCEDURE — 93000 ELECTROCARDIOGRAM COMPLETE: CPT | Performed by: INTERNAL MEDICINE

## 2022-08-11 NOTE — TELEPHONE ENCOUNTER
Patient called to cancel surgery on 8/18/22. She would like to reschedule for the 1st week of September if possible. If she can not reschedule for September she will keep the 8/18/22 date.     Please call: [phone]

## 2022-08-11 NOTE — PATIENT INSTRUCTIONS
[facility] Laboratory Locations - No appointment necessary. @ indicates the location is open Saturdays in addition to Monday through Friday. Call your preferred location for test preparation, business hours and other information you need. [employer] accepts [employer]. [facility]    @ [place] [facility] Svcs. [address] [facility] [address] [place] [address]. Cassie[address]   Ph: [phone] [facility] Svcs. [address]   Ph: [phone]  @ [facility] Lab Svcs. [address] [facility]   Ph: [phone]    [facility] Lab Svcs. [address]   Ph: [phone] @ [place] [facility] Svcs. [address]  Ph: [phone] @ [facility] Svcs. [address] [facility]. [address]   Ph: [phone]     [facility] Svcs. [place] [address]  Ph: [phone]    [place]   @ [place] [facility] Svcs. [address] [place], [place] [address]   Ph: [phone] [place] [facility] Office Bldg. [address] [facility], [address] [place] [address]  Ph: [address] [place] [phone], [phone]   [facility] [address] Lab Svcs. 54 [facility]   Ph: [phone] [facility]. Lab Svcs.   [address] [facility], [facility]   Ph: [phone]

## 2022-08-12 NOTE — TELEPHONE ENCOUNTER
LM informing patient that MD is booked that week in sept. An will keep the surgery scheduled for 8/18.

## 2022-08-14 NOTE — PROGRESS NOTES
Patient: Bony Castanon is a 55 y.o. female who presents today with the following Chief Complaint(s):    Chief Complaint   Patient presents with    Follow-up     3 mth    Pre-op Exam         HPIShe is here for a pre op examination. Scheduled for repair of incisional hernia. She denies exertional CP, SOB or recent infection. Current Outpatient Medications   Medication Sig Dispense Refill    diclofenac sodium (VOLTAREN) 1 % GEL Apply 4 g topically 4 times daily 350 g 1    meloxicam (MOBIC) 7.5 MG tablet Take 1 tablet by mouth 2 times daily as needed for Pain 30 tablet 1    triamcinolone (KENALOG) 0.1 % cream APPLY TOPICALLY TO THE AFFECTED AREA TWICE DAILY      sodium oxybate (XYREM) 500 MG/ML SOLN solution Take by mouth 2 times daily as needed. cyanocobalamin 1000 MCG tablet Take 1,000 mcg by mouth daily      terbinafine (LAMISIL) 250 MG tablet TAKE 1 TABLET BY MOUTH DAILY      sodium chloride (ALTAMIST SPRAY) 0.65 % nasal spray 1 spray by Nasal route as needed for Congestion 1 Bottle 3    ascorbic acid (VITAMIN C) 500 MG tablet Take 500 mg by mouth daily. ibuprofen (ADVIL;MOTRIN) 800 MG tablet Take 1 tablet by mouth every 8 hours as needed for Pain 15 tablet 0     No current facility-administered medications for this visit. Patient's past medical history, surgical history, family history, medications,and allergies  were all reviewed and updated as appropriate today. Review of Systems see H and P in media. Physical Exam see H and P in media. Vitals:    08/11/22 1144   BP: 122/80   Pulse: 70   Temp: (!) 96.7 °F (35.9 °C)   SpO2: 98%       Assessment:   Diagnosis Orders   1. Pre-op evaluation  EKG 12 Lead - Clinic Performed        2. Incisional hernia, without obstruction or gangrene        3. IGT (impaired glucose tolerance)  POCT glycosylated hemoglobin (Hb A1C)    POCT Glucose      4. BMI 40.0-44.9, adult ([address]        5.    Cardiopulmonary exam is stable.  Cardiac risk is low. 6.    May proceed with planned hernia repair. 7.    See complete H and P in media. Plan:  See plans above.

## 2022-08-15 ENCOUNTER — TELEPHONE (OUTPATIENT)
Dept: SURGERY | Age: 57
End: 2022-08-15

## 2022-08-15 NOTE — TELEPHONE ENCOUNTER
Unable to LM due to mail box was full.    Patient post op appt on 9/2/22 rescheduled due to MD out of town new appt 9/1/22 @ 1pm

## 2022-08-17 ENCOUNTER — TELEPHONE (OUTPATIENT)
Dept: SURGERY | Age: 57
End: 2022-08-17

## 2022-08-17 ENCOUNTER — TELEPHONE (OUTPATIENT)
Dept: INTERNAL MEDICINE CLINIC | Age: 57
End: 2022-08-17

## 2022-08-17 NOTE — TELEPHONE ENCOUNTER
Patient called stating she is not feeling well and is having heart palpitations. She would like to cancel her surgery for 8/18/22.      Please call to reschedule: [phone]

## 2022-08-17 NOTE — TELEPHONE ENCOUNTER
Lulu is needing the patients H&P patient is having his procedure in the morning 8/18. They were told that the report is in Epic and it was not.   Please advise

## 2022-09-02 ENCOUNTER — TELEPHONE (OUTPATIENT)
Dept: SURGERY | Age: 57
End: 2022-09-02

## 2022-09-12 NOTE — TELEPHONE ENCOUNTER
Patient seen on 6/3/22 surgery letter received Open repair of recurrent incisional hernia 2.5 hr OR time needed under general     Rescheduled surgery     Pre op instructions reviewed including the need for a H&P with a EXG  Pre op packet emailed to [email or web address]     Post op appt scheduled     Faxed to scheduling     Placed on calendar and outlook

## 2022-10-25 ENCOUNTER — OFFICE VISIT (OUTPATIENT)
Dept: INTERNAL MEDICINE CLINIC | Age: 57
End: 2022-10-25
Payer: COMMERCIAL

## 2022-10-25 VITALS
WEIGHT: 283 LBS | BODY MASS INDEX: 44.42 KG/M2 | DIASTOLIC BLOOD PRESSURE: 84 MMHG | HEIGHT: 67 IN | OXYGEN SATURATION: 98 % | HEART RATE: 73 BPM | SYSTOLIC BLOOD PRESSURE: 126 MMHG

## 2022-10-25 DIAGNOSIS — K43.9 HERNIA OF ABDOMINAL WALL: ICD-10-CM

## 2022-10-25 DIAGNOSIS — I83.92 ASYMPTOMATIC VARICOSE VEINS OF LEFT LOWER EXTREMITY: ICD-10-CM

## 2022-10-25 DIAGNOSIS — Z01.818 PRE-OP EXAM: Primary | ICD-10-CM

## 2022-10-25 DIAGNOSIS — I10 PRIMARY HYPERTENSION: ICD-10-CM

## 2022-10-25 PROCEDURE — 3074F SYST BP LT 130 MM HG: CPT | Performed by: INTERNAL MEDICINE

## 2022-10-25 PROCEDURE — 3078F DIAST BP <80 MM HG: CPT | Performed by: INTERNAL MEDICINE

## 2022-10-25 PROCEDURE — 99214 OFFICE O/P EST MOD 30 MIN: CPT | Performed by: INTERNAL MEDICINE

## 2022-10-25 RX ORDER — COVID-19 ANTIGEN TEST
KIT MISCELLANEOUS
COMMUNITY
Start: 2022-08-04 | End: 2022-11-02

## 2022-10-25 NOTE — PATIENT INSTRUCTIONS
[facility] Laboratory Locations - No appointment necessary. @ indicates the location is open Saturdays in addition to Monday through Friday. Call your preferred location for test preparation, business hours and other information you need. [employer] accepts [employer]. [facility]    @ [place] Lab Svcs. 3 [facility] [address] [place] [address]. Cassie, [address]   Ph: [phone] [facility] Lab Svcs. [address]   Ph: [phone]  @ [facility] Lab Svcs. [address] Jimmie Goldmann, [facility]   Ph: [phone]    [facility] Lab Svcs. [address]   Ph: [phone] @ [place] [facility] Svcs. [address]  Ph: [phone] @ [facility] Svcs. [facility]. Jack [address]   Ph: [phone]     [facility] Svcs. [place] [address]  Ph: [phone]    [place]   @ [place] [facility] Svcs. [address] [place], [place] [address]   Ph: [phone] [facility] Med. Office Bl. [address]  Ph: [address] [place] [address]   [facility] 30:  [address] Lab Svcs[address] [facility]   Ph: [phone] [facility]. Lab Svcs. [address] [place], [place] [address]   Ph: [phone]     Cooking for your heart and soul. [employer] of [employer]. Web site.

## 2022-10-29 NOTE — PROGRESS NOTES
Patient: Sajan Vazquez is a 65 y.o. female who presents today with the following Chief Complaint(s):    Chief Complaint   Patient presents with    Pre-op Exam         HPIShe is here for a pre op examination. Scheduled for revision of recurrent incisional hernia. She denies exertional CP, SOB or recent infection. Current Outpatient Medications   Medication Sig Dispense Refill    CARESTPlynked COVID-19 HOME TEST KIT       ascorbic acid (VITAMIN C) 500 MG tablet Take 500 mg by mouth daily. diclofenac sodium (VOLTAREN) 1 % GEL Apply 4 g topically 4 times daily (Patient not taking: Reported on 8/15/2022) 350 g 1    meloxicam (MOBIC) 7.5 MG tablet Take 1 tablet by mouth 2 times daily as needed for Pain (Patient not taking: Reported on 8/15/2022) 30 tablet 1    triamcinolone (KENALOG) 0.1 % cream APPLY TOPICALLY TO THE AFFECTED AREA TWICE DAILY (Patient not taking: Reported on 8/15/2022)      sodium oxybate (XYREM) 500 MG/ML SOLN solution Take by mouth 2 times daily as needed. (Patient not taking: Reported on 8/15/2022)      terbinafine (LAMISIL) 250 MG tablet TAKE 1 TABLET BY MOUTH DAILY (Patient not taking: Reported on 8/15/2022)      sodium chloride (ALTAMIST SPRAY) 0.65 % nasal spray 1 spray by Nasal route as needed for Congestion (Patient not taking: Reported on 8/15/2022) 1 Bottle 3    ibuprofen (ADVIL;MOTRIN) 800 MG tablet Take 1 tablet by mouth every 8 hours as needed for Pain 15 tablet 0     No current facility-administered medications for this visit. Patient's past medical history, surgical history, family history, medications,and allergies  were all reviewed and updated as appropriate today. Review of Systems see H and P in media. Physical Exam see H and P in media. Vitals:    10/25/22 1400   BP: 126/84   Pulse: 73   SpO2: 98%       Assessment:   Diagnosis Orders   1. Pre-op exam  Hernia repair. 2. Hernia of abdominal wall  Recurrent excisional hernia.        3. Primary hypertension  DASH diet discussed. 4. Asymptomatic varicose veins of left lower extremity  Weight reduction discussed. 5.     Cardiopulmonary exam is stable. Cardiac risk is low. May proceed with planned excisional hernia repair. 6.     See media for complete H and P.          Plan:  See plans above.

## 2022-11-02 NOTE — PROGRESS NOTES
Place patient label inside box (if no patient label, complete below)  Name:  :  MR#:   Tanesha Camara / PROCEDURE  I (we), Toño Edward  (Patient Name) authorize DR. CLAIR HARRIS (Provider / Bonita Serve) and/or such assistants as may be selected by him/her, to perform the following operation/procedure(s): OPEN REPAIR OF RECURRENT INCISIONAL HERNIA       Note: If unable to obtain consent prior to an emergent procedure, document the emergent reason in the medical record. This procedure has been explained to my (our) satisfaction and included in the explanation was: The intended benefit, nature, and extent of the procedure to be performed; The significant risks involved and the probability of success; Alternative procedures and methods of treatment; The dangers and probable consequences of such alternatives (including no procedure or treatment); The expected consequences of the procedure on my future health; Whether other qualified individuals would be performing important surgical tasks and/or whether  would be present to advise or support the procedure. I (we) understand that there are other risks of infection and other serious complications in the pre-operative/procedural and postoperative/procedural stages of my (our) care. I (we) have asked all of the questions which I (we) thought were important in deciding whether or not to undergo treatment or diagnosis. These questions have been answered to my (our) satisfaction. I (we) understand that no assurance can be given that the procedure will be a success, and no guarantee or warranty of success has been given to me (us). It has been explained to me (us) that during the course of the operation/procedure, unforeseen conditions may be revealed that necessitate extension of the original procedure(s) or different procedure(s) than those set forth in Paragraph 1.  I (we) authorize and request that the above-named physician, his/her assistants or his/her designees, perform procedures as necessary and desirable if deemed to be in my (our) best interest.     Revised 8/2/2021                                                                          Page 1 of 2       I acknowledge that health care personnel may be observing this procedure for the purpose of medical education or other specified purposes as may be necessary as requested and/or approved by my (our) physician. I (we) consent to the disposal by the hospital Pathologist of the removed tissue, parts or organs in accordance with hospital policy. I do ____ do not ____ consent to the use of a local infiltration pain blocking agent that will be used by my provider/surgical provider to help alleviate pain during my procedure. I do ____ do not ____ consent to an emergent blood transfusion in the case of a life-threatening situation that requires blood components to be administered. This consent is valid for 24 hours from the beginning of the procedure. This patient does ____ or does not ____ currently have a DNR status/order. If DNR order is in place, obtain Addendum to the Surgical Consent for ALL Patients with a DNR Order to address rachel-operative status for limited intervention or DNR suspension.      I have read and fully understand the above Consent for Operation/Procedure and that all blanks were completed before I signed the consent.   _____________________________       _____________________      ____/____am/pm  Signature of Patient or legal representative      Printed Name / Relationship            Date / Time   ____________________________       _____________________      ____/____am/pm  Witness to Signature                                    Printed Name                    Date / Time    If patient is unable to sign or is a minor, complete the following)  Patient is a minor, ____ years of age, or unable to sign because: ______________________________________________________________________________________________    If a phone consent is obtained, consent will be documented by using two health care professionals, each affirming that the consenting party has no questions and gives consent for the procedure discussed with the physician/provider.   _____________________          ____________________       _____/_____am/pm   2nd witness to phone consent        Printed name           Date / Time    Informed Consent:  I have provided the explanation described above in section 1 to the patient and/or legal representative.  I have provided the patient and/or legal representative with an opportunity to ask any questions about the proposed operation/procedure.   ___________________________          ____________________         ____/____am/pm  Provider / Proceduralist                            Printed name            Date / Time  Revised 8/2/2021                                                                      Page 2 of 2

## 2022-11-02 NOTE — PROGRESS NOTES

## 2022-11-03 ENCOUNTER — ANESTHESIA EVENT (OUTPATIENT)
Dept: OPERATING ROOM | Age: 57
DRG: 354 | End: 2022-11-03
Payer: COMMERCIAL

## 2022-11-03 ENCOUNTER — ANESTHESIA (OUTPATIENT)
Dept: OPERATING ROOM | Age: 57
DRG: 354 | End: 2022-11-03
Payer: COMMERCIAL

## 2022-11-03 ENCOUNTER — APPOINTMENT (OUTPATIENT)
Dept: GENERAL RADIOLOGY | Age: 57
DRG: 354 | End: 2022-11-03
Attending: SURGERY
Payer: COMMERCIAL

## 2022-11-03 ENCOUNTER — HOSPITAL ENCOUNTER (INPATIENT)
Age: 57
LOS: 5 days | Discharge: HOME | DRG: 354 | End: 2022-11-08
Attending: SURGERY | Admitting: SURGERY
Payer: COMMERCIAL

## 2022-11-03 DIAGNOSIS — K43.2 INCISIONAL HERNIA, WITHOUT OBSTRUCTION OR GANGRENE: Primary | ICD-10-CM

## 2022-11-03 DIAGNOSIS — K43.2 RECURRENT INCISIONAL HERNIA: ICD-10-CM

## 2022-11-03 LAB
ALBUMIN SERPL-MCNC: 3.9 G/DL (ref 3.4–5)
ANION GAP SERPL CALCULATED.3IONS-SCNC: 13 MMOL/L (ref 3–16)
BASOPHILS ABSOLUTE: 0 K/UL (ref 0–0.2)
BASOPHILS RELATIVE PERCENT: 0 %
BUN BLDV-MCNC: 12 MG/DL (ref 7–20)
CALCIUM SERPL-MCNC: 9.8 MG/DL (ref 8.3–10.6)
CHLORIDE BLD-SCNC: 104 MMOL/L (ref 99–110)
CO2: 20 MMOL/L (ref 21–32)
CREAT SERPL-MCNC: 0.7 MG/DL (ref 0.6–1.1)
EOSINOPHILS ABSOLUTE: 0 K/UL (ref 0–0.6)
EOSINOPHILS RELATIVE PERCENT: 0 %
GFR SERPL CREATININE-BSD FRML MDRD: >60 ML/MIN/{1.73_M2}
GLUCOSE BLD-MCNC: 185 MG/DL (ref 70–99)
GLUCOSE BLD-MCNC: 187 MG/DL (ref 70–99)
GLUCOSE BLD-MCNC: 95 MG/DL (ref 70–99)
HCT VFR BLD CALC: 41.4 % (ref 36–48)
HEMOGLOBIN: 14 G/DL (ref 12–16)
LYMPHOCYTES ABSOLUTE: 0.4 K/UL (ref 1–5.1)
LYMPHOCYTES RELATIVE PERCENT: 4.3 %
MAGNESIUM: 1.7 MG/DL (ref 1.8–2.4)
MCH RBC QN AUTO: 31.6 PG (ref 26–34)
MCHC RBC AUTO-ENTMCNC: 33.8 G/DL (ref 31–36)
MCV RBC AUTO: 93.5 FL (ref 80–100)
MONOCYTES ABSOLUTE: 0.4 K/UL (ref 0–1.3)
MONOCYTES RELATIVE PERCENT: 3.9 %
NEUTROPHILS ABSOLUTE: 9 K/UL (ref 1.7–7.7)
NEUTROPHILS RELATIVE PERCENT: 91.8 %
PDW BLD-RTO: 13.3 % (ref 12.4–15.4)
PERFORMED ON: ABNORMAL
PERFORMED ON: NORMAL
PHOSPHORUS: 3.8 MG/DL (ref 2.5–4.9)
PLATELET # BLD: 164 K/UL (ref 135–450)
PLATELET SLIDE REVIEW: ADEQUATE
PMV BLD AUTO: 9.3 FL (ref 5–10.5)
POTASSIUM SERPL-SCNC: 5.1 MMOL/L (ref 3.5–5.1)
PREGNANCY, URINE: NEGATIVE
RBC # BLD: 4.43 M/UL (ref 4–5.2)
SODIUM BLD-SCNC: 137 MMOL/L (ref 136–145)
TROPONIN: 0.01 NG/ML
WBC # BLD: 9.8 K/UL (ref 4–11)

## 2022-11-03 PROCEDURE — 84703 CHORIONIC GONADOTROPIN ASSAY: CPT

## 2022-11-03 PROCEDURE — 6360000002 HC RX W HCPCS

## 2022-11-03 PROCEDURE — A4217 STERILE WATER/SALINE, 500 ML: HCPCS | Performed by: SURGERY

## 2022-11-03 PROCEDURE — 2580000003 HC RX 258: Performed by: SURGERY

## 2022-11-03 PROCEDURE — 2700000000 HC OXYGEN THERAPY PER DAY

## 2022-11-03 PROCEDURE — 88302 TISSUE EXAM BY PATHOLOGIST: CPT

## 2022-11-03 PROCEDURE — 2580000003 HC RX 258: Performed by: STUDENT IN AN ORGANIZED HEALTH CARE EDUCATION/TRAINING PROGRAM

## 2022-11-03 PROCEDURE — 3700000000 HC ANESTHESIA ATTENDED CARE: Performed by: SURGERY

## 2022-11-03 PROCEDURE — 2709999900 HC NON-CHARGEABLE SUPPLY: Performed by: SURGERY

## 2022-11-03 PROCEDURE — 2500000003 HC RX 250 WO HCPCS

## 2022-11-03 PROCEDURE — 94664 DEMO&/EVAL PT USE INHALER: CPT

## 2022-11-03 PROCEDURE — 7100000001 HC PACU RECOVERY - ADDTL 15 MIN: Performed by: SURGERY

## 2022-11-03 PROCEDURE — 2720000010 HC SURG SUPPLY STERILE: Performed by: SURGERY

## 2022-11-03 PROCEDURE — 94761 N-INVAS EAR/PLS OXIMETRY MLT: CPT

## 2022-11-03 PROCEDURE — 0WPF0JZ REMOVAL OF SYNTHETIC SUBSTITUTE FROM ABDOMINAL WALL, OPEN APPROACH: ICD-10-PCS | Performed by: SURGERY

## 2022-11-03 PROCEDURE — 6360000002 HC RX W HCPCS: Performed by: STUDENT IN AN ORGANIZED HEALTH CARE EDUCATION/TRAINING PROGRAM

## 2022-11-03 PROCEDURE — 64488 TAP BLOCK BI INJECTION: CPT | Performed by: ANESTHESIOLOGY

## 2022-11-03 PROCEDURE — 84484 ASSAY OF TROPONIN QUANT: CPT

## 2022-11-03 PROCEDURE — 2580000003 HC RX 258: Performed by: ANESTHESIOLOGY

## 2022-11-03 PROCEDURE — 83735 ASSAY OF MAGNESIUM: CPT

## 2022-11-03 PROCEDURE — 1200000000 HC SEMI PRIVATE

## 2022-11-03 PROCEDURE — 6360000002 HC RX W HCPCS: Performed by: SURGERY

## 2022-11-03 PROCEDURE — 6360000002 HC RX W HCPCS: Performed by: ANESTHESIOLOGY

## 2022-11-03 PROCEDURE — 71045 X-RAY EXAM CHEST 1 VIEW: CPT

## 2022-11-03 PROCEDURE — 0WUF0JZ SUPPLEMENT ABDOMINAL WALL WITH SYNTHETIC SUBSTITUTE, OPEN APPROACH: ICD-10-PCS | Performed by: SURGERY

## 2022-11-03 PROCEDURE — 3600000004 HC SURGERY LEVEL 4 BASE: Performed by: SURGERY

## 2022-11-03 PROCEDURE — 93005 ELECTROCARDIOGRAM TRACING: CPT

## 2022-11-03 PROCEDURE — 88304 TISSUE EXAM BY PATHOLOGIST: CPT

## 2022-11-03 PROCEDURE — 3600000014 HC SURGERY LEVEL 4 ADDTL 15MIN: Performed by: SURGERY

## 2022-11-03 PROCEDURE — 80069 RENAL FUNCTION PANEL: CPT

## 2022-11-03 PROCEDURE — 7100000000 HC PACU RECOVERY - FIRST 15 MIN: Performed by: SURGERY

## 2022-11-03 PROCEDURE — 36415 COLL VENOUS BLD VENIPUNCTURE: CPT

## 2022-11-03 PROCEDURE — 49520 REREPAIR ING HERNIA REDUCE: CPT | Performed by: SURGERY

## 2022-11-03 PROCEDURE — 85025 COMPLETE CBC W/AUTO DIFF WBC: CPT

## 2022-11-03 PROCEDURE — 3700000001 HC ADD 15 MINUTES (ANESTHESIA): Performed by: SURGERY

## 2022-11-03 PROCEDURE — 15734 MUSCLE-SKIN GRAFT TRUNK: CPT | Performed by: SURGERY

## 2022-11-03 PROCEDURE — C1781 MESH (IMPLANTABLE): HCPCS | Performed by: SURGERY

## 2022-11-03 DEVICE — MESH HERN W12XL12IN INGUINAL POLYPR SQ L PORE MFIL SF: Type: IMPLANTABLE DEVICE | Status: FUNCTIONAL

## 2022-11-03 RX ORDER — PROCHLORPERAZINE EDISYLATE 5 MG/ML
5 INJECTION INTRAMUSCULAR; INTRAVENOUS
Status: DISCONTINUED | OUTPATIENT
Start: 2022-11-03 | End: 2022-11-03 | Stop reason: HOSPADM

## 2022-11-03 RX ORDER — VECURONIUM BROMIDE 1 MG/ML
INJECTION, POWDER, LYOPHILIZED, FOR SOLUTION INTRAVENOUS PRN
Status: DISCONTINUED | OUTPATIENT
Start: 2022-11-03 | End: 2022-11-03 | Stop reason: SDUPTHER

## 2022-11-03 RX ORDER — DOCUSATE SODIUM 100 MG/1
100 CAPSULE, LIQUID FILLED ORAL 2 TIMES DAILY PRN
Qty: 28 CAPSULE | Refills: 0 | Status: ON HOLD | OUTPATIENT
Start: 2022-11-03 | End: 2022-11-17 | Stop reason: HOSPADM

## 2022-11-03 RX ORDER — ONDANSETRON 2 MG/ML
INJECTION INTRAMUSCULAR; INTRAVENOUS PRN
Status: DISCONTINUED | OUTPATIENT
Start: 2022-11-03 | End: 2022-11-03 | Stop reason: SDUPTHER

## 2022-11-03 RX ORDER — DEXMEDETOMIDINE HYDROCHLORIDE 100 UG/ML
INJECTION, SOLUTION INTRAVENOUS PRN
Status: DISCONTINUED | OUTPATIENT
Start: 2022-11-03 | End: 2022-11-03 | Stop reason: SDUPTHER

## 2022-11-03 RX ORDER — PROPOFOL 10 MG/ML
INJECTION, EMULSION INTRAVENOUS PRN
Status: DISCONTINUED | OUTPATIENT
Start: 2022-11-03 | End: 2022-11-03 | Stop reason: SDUPTHER

## 2022-11-03 RX ORDER — SODIUM CHLORIDE, SODIUM LACTATE, POTASSIUM CHLORIDE, CALCIUM CHLORIDE 600; 310; 30; 20 MG/100ML; MG/100ML; MG/100ML; MG/100ML
INJECTION, SOLUTION INTRAVENOUS CONTINUOUS
Status: DISCONTINUED | OUTPATIENT
Start: 2022-11-03 | End: 2022-11-06

## 2022-11-03 RX ORDER — HYDROMORPHONE HCL 110MG/55ML
PATIENT CONTROLLED ANALGESIA SYRINGE INTRAVENOUS PRN
Status: DISCONTINUED | OUTPATIENT
Start: 2022-11-03 | End: 2022-11-03 | Stop reason: SDUPTHER

## 2022-11-03 RX ORDER — ONDANSETRON 4 MG/1
4 TABLET, ORALLY DISINTEGRATING ORAL EVERY 8 HOURS PRN
Status: DISCONTINUED | OUTPATIENT
Start: 2022-11-03 | End: 2022-11-08 | Stop reason: HOSPADM

## 2022-11-03 RX ORDER — HYDRALAZINE HYDROCHLORIDE 20 MG/ML
10 INJECTION INTRAMUSCULAR; INTRAVENOUS
Status: DISCONTINUED | OUTPATIENT
Start: 2022-11-03 | End: 2022-11-03 | Stop reason: HOSPADM

## 2022-11-03 RX ORDER — SODIUM CHLORIDE, SODIUM LACTATE, POTASSIUM CHLORIDE, CALCIUM CHLORIDE 600; 310; 30; 20 MG/100ML; MG/100ML; MG/100ML; MG/100ML
INJECTION, SOLUTION INTRAVENOUS CONTINUOUS
Status: DISCONTINUED | OUTPATIENT
Start: 2022-11-03 | End: 2022-11-03 | Stop reason: HOSPADM

## 2022-11-03 RX ORDER — SODIUM CHLORIDE 0.9 % (FLUSH) 0.9 %
5-40 SYRINGE (ML) INJECTION EVERY 12 HOURS SCHEDULED
Status: DISCONTINUED | OUTPATIENT
Start: 2022-11-03 | End: 2022-11-03 | Stop reason: HOSPADM

## 2022-11-03 RX ORDER — FENTANYL CITRATE 50 UG/ML
INJECTION, SOLUTION INTRAMUSCULAR; INTRAVENOUS PRN
Status: DISCONTINUED | OUTPATIENT
Start: 2022-11-03 | End: 2022-11-03 | Stop reason: SDUPTHER

## 2022-11-03 RX ORDER — OXYCODONE HYDROCHLORIDE 5 MG/1
5 TABLET ORAL EVERY 4 HOURS PRN
Status: DISCONTINUED | OUTPATIENT
Start: 2022-11-03 | End: 2022-11-08 | Stop reason: HOSPADM

## 2022-11-03 RX ORDER — SODIUM CHLORIDE 9 MG/ML
INJECTION, SOLUTION INTRAVENOUS PRN
Status: DISCONTINUED | OUTPATIENT
Start: 2022-11-03 | End: 2022-11-08 | Stop reason: HOSPADM

## 2022-11-03 RX ORDER — PHENYLEPHRINE HYDROCHLORIDE 10 MG/ML
INJECTION INTRAVENOUS PRN
Status: DISCONTINUED | OUTPATIENT
Start: 2022-11-03 | End: 2022-11-03 | Stop reason: SDUPTHER

## 2022-11-03 RX ORDER — ENOXAPARIN SODIUM 100 MG/ML
30 INJECTION SUBCUTANEOUS 2 TIMES DAILY
Status: DISCONTINUED | OUTPATIENT
Start: 2022-11-04 | End: 2022-11-08 | Stop reason: HOSPADM

## 2022-11-03 RX ORDER — EPHEDRINE SULFATE 50 MG/ML
INJECTION INTRAVENOUS PRN
Status: DISCONTINUED | OUTPATIENT
Start: 2022-11-03 | End: 2022-11-03 | Stop reason: SDUPTHER

## 2022-11-03 RX ORDER — SODIUM CHLORIDE 0.9 % (FLUSH) 0.9 %
10 SYRINGE (ML) INJECTION PRN
Status: DISCONTINUED | OUTPATIENT
Start: 2022-11-03 | End: 2022-11-08 | Stop reason: HOSPADM

## 2022-11-03 RX ORDER — OXYCODONE HYDROCHLORIDE 5 MG/1
10 TABLET ORAL EVERY 4 HOURS PRN
Status: DISCONTINUED | OUTPATIENT
Start: 2022-11-03 | End: 2022-11-08 | Stop reason: HOSPADM

## 2022-11-03 RX ORDER — MAGNESIUM HYDROXIDE 1200 MG/15ML
LIQUID ORAL CONTINUOUS PRN
Status: DISCONTINUED | OUTPATIENT
Start: 2022-11-03 | End: 2022-11-03 | Stop reason: HOSPADM

## 2022-11-03 RX ORDER — MEPERIDINE HYDROCHLORIDE 25 MG/ML
12.5 INJECTION INTRAMUSCULAR; INTRAVENOUS; SUBCUTANEOUS EVERY 5 MIN PRN
Status: DISCONTINUED | OUTPATIENT
Start: 2022-11-03 | End: 2022-11-03 | Stop reason: HOSPADM

## 2022-11-03 RX ORDER — ACETAMINOPHEN 500 MG
1000 TABLET ORAL EVERY 6 HOURS
Status: DISCONTINUED | OUTPATIENT
Start: 2022-11-03 | End: 2022-11-08 | Stop reason: HOSPADM

## 2022-11-03 RX ORDER — SODIUM CHLORIDE 0.9 % (FLUSH) 0.9 %
5-40 SYRINGE (ML) INJECTION PRN
Status: DISCONTINUED | OUTPATIENT
Start: 2022-11-03 | End: 2022-11-03 | Stop reason: HOSPADM

## 2022-11-03 RX ORDER — SUCCINYLCHOLINE/SOD CL,ISO/PF 200MG/10ML
SYRINGE (ML) INTRAVENOUS PRN
Status: DISCONTINUED | OUTPATIENT
Start: 2022-11-03 | End: 2022-11-03 | Stop reason: SDUPTHER

## 2022-11-03 RX ORDER — LIDOCAINE HYDROCHLORIDE 10 MG/ML
1 INJECTION, SOLUTION EPIDURAL; INFILTRATION; INTRACAUDAL; PERINEURAL
Status: DISCONTINUED | OUTPATIENT
Start: 2022-11-03 | End: 2022-11-03 | Stop reason: HOSPADM

## 2022-11-03 RX ORDER — LIDOCAINE HYDROCHLORIDE 20 MG/ML
INJECTION, SOLUTION INTRAVENOUS PRN
Status: DISCONTINUED | OUTPATIENT
Start: 2022-11-03 | End: 2022-11-03 | Stop reason: SDUPTHER

## 2022-11-03 RX ORDER — KETAMINE HCL IN NACL, ISO-OSM 20 MG/2 ML
SYRINGE (ML) INJECTION PRN
Status: DISCONTINUED | OUTPATIENT
Start: 2022-11-03 | End: 2022-11-03 | Stop reason: SDUPTHER

## 2022-11-03 RX ORDER — ROCURONIUM BROMIDE 10 MG/ML
INJECTION, SOLUTION INTRAVENOUS PRN
Status: DISCONTINUED | OUTPATIENT
Start: 2022-11-03 | End: 2022-11-03 | Stop reason: SDUPTHER

## 2022-11-03 RX ORDER — MIDAZOLAM HYDROCHLORIDE 1 MG/ML
INJECTION INTRAMUSCULAR; INTRAVENOUS PRN
Status: DISCONTINUED | OUTPATIENT
Start: 2022-11-03 | End: 2022-11-03 | Stop reason: SDUPTHER

## 2022-11-03 RX ORDER — DEXAMETHASONE SODIUM PHOSPHATE 4 MG/ML
INJECTION, SOLUTION INTRA-ARTICULAR; INTRALESIONAL; INTRAMUSCULAR; INTRAVENOUS; SOFT TISSUE PRN
Status: DISCONTINUED | OUTPATIENT
Start: 2022-11-03 | End: 2022-11-03 | Stop reason: SDUPTHER

## 2022-11-03 RX ORDER — OXYCODONE HYDROCHLORIDE 5 MG/1
5 TABLET ORAL EVERY 6 HOURS PRN
Qty: 28 TABLET | Refills: 0 | Status: SHIPPED | OUTPATIENT
Start: 2022-11-03 | End: 2022-11-10

## 2022-11-03 RX ORDER — SODIUM CHLORIDE 9 MG/ML
INJECTION, SOLUTION INTRAVENOUS PRN
Status: DISCONTINUED | OUTPATIENT
Start: 2022-11-03 | End: 2022-11-03 | Stop reason: HOSPADM

## 2022-11-03 RX ORDER — SODIUM CHLORIDE 9 MG/ML
25 INJECTION, SOLUTION INTRAVENOUS PRN
Status: DISCONTINUED | OUTPATIENT
Start: 2022-11-03 | End: 2022-11-03 | Stop reason: HOSPADM

## 2022-11-03 RX ORDER — ONDANSETRON 2 MG/ML
4 INJECTION INTRAMUSCULAR; INTRAVENOUS EVERY 6 HOURS PRN
Status: DISCONTINUED | OUTPATIENT
Start: 2022-11-03 | End: 2022-11-08 | Stop reason: HOSPADM

## 2022-11-03 RX ORDER — SODIUM CHLORIDE 0.9 % (FLUSH) 0.9 %
10 SYRINGE (ML) INJECTION EVERY 12 HOURS SCHEDULED
Status: DISCONTINUED | OUTPATIENT
Start: 2022-11-03 | End: 2022-11-08 | Stop reason: HOSPADM

## 2022-11-03 RX ADMIN — SUGAMMADEX 200 MG: 100 INJECTION, SOLUTION INTRAVENOUS at 11:17

## 2022-11-03 RX ADMIN — SODIUM CHLORIDE, POTASSIUM CHLORIDE, SODIUM LACTATE AND CALCIUM CHLORIDE: 600; 310; 30; 20 INJECTION, SOLUTION INTRAVENOUS at 06:56

## 2022-11-03 RX ADMIN — HYDROMORPHONE HYDROCHLORIDE 1 MG: 2 INJECTION, SOLUTION INTRAMUSCULAR; INTRAVENOUS; SUBCUTANEOUS at 10:21

## 2022-11-03 RX ADMIN — HYDROMORPHONE HYDROCHLORIDE 0.25 MG: 1 INJECTION, SOLUTION INTRAMUSCULAR; INTRAVENOUS; SUBCUTANEOUS at 12:45

## 2022-11-03 RX ADMIN — DEXMEDETOMIDINE HYDROCHLORIDE 4 MCG: 100 INJECTION, SOLUTION INTRAVENOUS at 11:03

## 2022-11-03 RX ADMIN — DEXAMETHASONE SODIUM PHOSPHATE 4 MG: 4 INJECTION, SOLUTION INTRAMUSCULAR; INTRAVENOUS at 07:39

## 2022-11-03 RX ADMIN — DEXAMETHASONE SODIUM PHOSPHATE 4 MG: 4 INJECTION, SOLUTION INTRAMUSCULAR; INTRAVENOUS at 10:42

## 2022-11-03 RX ADMIN — PHENYLEPHRINE HYDROCHLORIDE 100 MCG: 10 INJECTION INTRAVENOUS at 08:36

## 2022-11-03 RX ADMIN — LIDOCAINE HYDROCHLORIDE 100 MG: 20 INJECTION, SOLUTION INTRAVENOUS at 07:34

## 2022-11-03 RX ADMIN — SODIUM CHLORIDE, POTASSIUM CHLORIDE, SODIUM LACTATE AND CALCIUM CHLORIDE: 600; 310; 30; 20 INJECTION, SOLUTION INTRAVENOUS at 22:52

## 2022-11-03 RX ADMIN — FENTANYL CITRATE 50 MCG: 50 INJECTION, SOLUTION INTRAMUSCULAR; INTRAVENOUS at 07:47

## 2022-11-03 RX ADMIN — DEXMEDETOMIDINE HYDROCHLORIDE 6 MCG: 100 INJECTION, SOLUTION INTRAVENOUS at 10:40

## 2022-11-03 RX ADMIN — ROCURONIUM BROMIDE 5 MG: 10 INJECTION INTRAVENOUS at 07:34

## 2022-11-03 RX ADMIN — PROPOFOL 150 MG: 10 INJECTION, EMULSION INTRAVENOUS at 07:34

## 2022-11-03 RX ADMIN — Medication 160 MG: at 07:34

## 2022-11-03 RX ADMIN — VECURONIUM BROMIDE 2 MG: 10 INJECTION, POWDER, LYOPHILIZED, FOR SOLUTION INTRAVENOUS at 09:13

## 2022-11-03 RX ADMIN — HYDROMORPHONE HYDROCHLORIDE 1 MG: 2 INJECTION, SOLUTION INTRAMUSCULAR; INTRAVENOUS; SUBCUTANEOUS at 09:43

## 2022-11-03 RX ADMIN — HYDROMORPHONE HYDROCHLORIDE 0.25 MG: 1 INJECTION, SOLUTION INTRAMUSCULAR; INTRAVENOUS; SUBCUTANEOUS at 21:08

## 2022-11-03 RX ADMIN — VECURONIUM BROMIDE 1 MG: 10 INJECTION, POWDER, LYOPHILIZED, FOR SOLUTION INTRAVENOUS at 09:35

## 2022-11-03 RX ADMIN — VECURONIUM BROMIDE 2 MG: 10 INJECTION, POWDER, LYOPHILIZED, FOR SOLUTION INTRAVENOUS at 10:08

## 2022-11-03 RX ADMIN — HYDROMORPHONE HYDROCHLORIDE 0.25 MG: 1 INJECTION, SOLUTION INTRAMUSCULAR; INTRAVENOUS; SUBCUTANEOUS at 15:47

## 2022-11-03 RX ADMIN — ROCURONIUM BROMIDE 45 MG: 10 INJECTION INTRAVENOUS at 07:44

## 2022-11-03 RX ADMIN — METHOCARBAMOL 1000 MG: 100 INJECTION, SOLUTION INTRAMUSCULAR; INTRAVENOUS at 12:48

## 2022-11-03 RX ADMIN — ONDANSETRON 4 MG: 2 INJECTION INTRAMUSCULAR; INTRAVENOUS at 10:42

## 2022-11-03 RX ADMIN — ONDANSETRON 4 MG: 2 INJECTION INTRAMUSCULAR; INTRAVENOUS at 07:39

## 2022-11-03 RX ADMIN — FENTANYL CITRATE 50 MCG: 50 INJECTION, SOLUTION INTRAMUSCULAR; INTRAVENOUS at 07:34

## 2022-11-03 RX ADMIN — MIDAZOLAM HYDROCHLORIDE 2 MG: 2 INJECTION, SOLUTION INTRAMUSCULAR; INTRAVENOUS at 07:26

## 2022-11-03 RX ADMIN — Medication 20 MG: at 08:00

## 2022-11-03 RX ADMIN — EPHEDRINE SULFATE 20 MG: 50 INJECTION INTRAVENOUS at 08:52

## 2022-11-03 RX ADMIN — ONDANSETRON 4 MG: 2 INJECTION INTRAMUSCULAR; INTRAVENOUS at 23:53

## 2022-11-03 RX ADMIN — DEXMEDETOMIDINE HYDROCHLORIDE 4 MCG: 100 INJECTION, SOLUTION INTRAVENOUS at 10:47

## 2022-11-03 RX ADMIN — PROPOFOL 50 MG: 10 INJECTION, EMULSION INTRAVENOUS at 07:45

## 2022-11-03 RX ADMIN — VECURONIUM BROMIDE 5 MG: 10 INJECTION, POWDER, LYOPHILIZED, FOR SOLUTION INTRAVENOUS at 07:53

## 2022-11-03 RX ADMIN — CEFAZOLIN 2000 MG: 2 INJECTION, POWDER, FOR SOLUTION INTRAMUSCULAR; INTRAVENOUS at 07:40

## 2022-11-03 ASSESSMENT — PAIN SCALES - GENERAL
PAINLEVEL_OUTOF10: 4
PAINLEVEL_OUTOF10: 4
PAINLEVEL_OUTOF10: 3
PAINLEVEL_OUTOF10: 5
PAINLEVEL_OUTOF10: 6
PAINLEVEL_OUTOF10: 3
PAINLEVEL_OUTOF10: 3
PAINLEVEL_OUTOF10: 0
PAINLEVEL_OUTOF10: 4
PAINLEVEL_OUTOF10: 5
PAINLEVEL_OUTOF10: 4

## 2022-11-03 ASSESSMENT — PAIN DESCRIPTION - LOCATION
LOCATION: HEAD
LOCATION: ABDOMEN

## 2022-11-03 ASSESSMENT — PAIN DESCRIPTION - DESCRIPTORS
DESCRIPTORS: BURNING
DESCRIPTORS: ACHING
DESCRIPTORS: ACHING

## 2022-11-03 ASSESSMENT — PAIN DESCRIPTION - ORIENTATION
ORIENTATION: MID
ORIENTATION: ANTERIOR;POSTERIOR
ORIENTATION: MID
ORIENTATION: LOWER

## 2022-11-03 NOTE — H&P
Alf Peña    [id number]    [facility] Same Day Surgery Update H & P  Department of General Surgery   Surgical Service   Pre-operative History and Physical    Last H & P within the last 30 days. DIAGNOSIS:   Recurrent incisional hernia [K43.2]    Procedure(s):  OPEN REPAIR OF RECURRENT INCISIONAL HERNIA     History obtained from: Patient interview and EHR     HISTORY OF PRESENT ILLNESS:   Patient with recurrent incisional hernia, with associated discomfort with exertion, presents today for repair. Illness Screening: Patient denies fever, chills, worsening cough, or known close contact with sick individuals. Past Medical History:        Diagnosis Date    Arrhythmia     pt reports intermittent arrhythmias -- Dr. Stephanie Fontana is cardiologist, doesn't take meds    CAD (coronary artery disease)     Cataplexy     Essential hypertension 09/15/2017    High blood pressure     Migraine     Narcolepsy     takes naps to prevent    Pneumonia     june 2022    Prediabetes     Presence of upper and lower permanent dental bridges     Recurrent incisional hernia     Unspecified sleep apnea     NO CPAP    Varicose veins of lower extremities 10/09/2011     Past Surgical History:        Procedure Laterality Date    COLONOSCOPY      ENDOMETRIAL ABLATION  06/2009    TUBAL LIGATION      tubes tied 13 yrs ago -- [address] N/A 12/3/2019    OPEN INCISIONAL HERNIA REPAIR WITH UMBILECTOMY performed by Tomas Briggs MD at [address]         Medications Prior to Admission:      Prior to Admission medications    Medication Sig Start Date End Date Taking?  Authorizing Provider   Aspirin-Acetaminophen-Caffeine (EXCEDRIN MIGRAINE PO) Take 1 tablet by mouth as needed   Yes Historical Provider, MD   diclofenac sodium (VOLTAREN) 1 % GEL Apply 4 g topically 4 times daily  Patient not taking: No sig reported 2/9/22   Ann Culver MD   meloxicam (MOBIC) 7.5 MG tablet Take 1 tablet by mouth 2 times daily as needed for Pain  Patient not taking: Reported on 8/15/2022 10/20/21   Ny Slaughter MD   triamcinolone (KENALOG) 0.1 % cream APPLY TOPICALLY TO THE AFFECTED AREA TWICE DAILY  Patient not taking: No sig reported 4/29/21   Historical Provider, MD   sodium oxybate (XYREM) 500 MG/ML SOLN solution Take by mouth 2 times daily as needed. Patient not taking: No sig reported    Historical Provider, MD   terbinafine (LAMISIL) 250 MG tablet TAKE 1 TABLET BY MOUTH DAILY  Patient not taking: No sig reported 4/19/21   Historical Provider, MD   sodium chloride (ALTAMIST SPRAY) 0.65 % nasal spray 1 spray by Nasal route as needed for Congestion  Patient not taking: No sig reported 9/15/17   NICOLE Guo - CNP   ibuprofen (ADVIL;MOTRIN) 800 MG tablet Take 1 tablet by mouth every 8 hours as needed for Pain 8/21/17 11/3/22  Keaton Miller MD   ascorbic acid (VITAMIN C) 500 MG tablet Take 500 mg by mouth daily. Historical Provider, MD         Allergies:  Patient has no known allergies. PHYSICAL EXAM:      BP (!) 151/98 Comment: Anesthesia aware  Pulse 85   Temp 97.4 °F (36.3 °C) (Temporal)   Resp 15   Ht 5' 7.5\" (1.715 m)   Wt 280 lb 6.4 oz (127.2 kg)   LMP 01/03/2022 (Approximate) Comment: Pt states \"Had Period in January 2022\"  SpO2 98%   BMI 43.27 kg/m²      Airway:  Airway patent with no audible stridor    Heart:  Regular rate and rhythm, No murmur noted    Lungs:  No increased work of breathing, good air exchange, clear to auscultation bilaterally, no crackles or wheezing    Abdomen:  Soft, non-distended, non-tender, no rebound tenderness or guarding, and no masses palpated    ASSESSMENT AND PLAN     Patient is a 65 y.o. female with above specified procedure planned. 1.  The patients history and physical was obtained and signed off by the pre-admission testing department. Patient seen and focused exam done today- no new changes since last physical exam on 10/25/22    2.   Access to ancillary services are available per request of the provider.     NICOLE Monterroso - REID     11/3/2022

## 2022-11-03 NOTE — BRIEF OP NOTE
Brief Postoperative Note      Patient: Peg Manjarrez  YOB: 1965  MRN: [id number]    Date of Procedure: 11/3/2022    Pre-Op Diagnosis: Recurrent incisional hernia [K43.2]    Post-Op Diagnosis: Same       Procedure(s):  OPEN REPAIR OF RECURRENT INCISIONAL HERNIA WITH RETRORECTUS MESH PLACEMENT, BILATERAL COMPONENT SEPARATION    Surgeon(s):  Joni Adams MD    Assistant:  Resident: Isael Modi DO    Anesthesia: General    Estimated Blood Loss (mL): 233    Complications: None    Specimens:   ID Type Source Tests Collected by Time Destination   A : 800 Merary Galo MD 11/3/2022 8432    B : RESIDUAL MESH Hardware Hardware SURGICAL PATHOLOGY Joni Adams MD 11/3/2022 1940        Implants:  * No implants in log *      Drains:   Closed/Suction Drain Medial RLQ Bulb (Active)       Findings: Large midline incisional hernia with colon and omentum. Bilateral component separation. Posterior fascia closed with running 0-vicryl x2. Large soft bard mesh in retro-rectus space. Drain in retrorectus space.    Electronically signed by Isael Modi DO on 11/3/2022 at 11:14 AM

## 2022-11-03 NOTE — ANESTHESIA PRE PROCEDURE
Department of Anesthesiology  Preprocedure Note       Name:  Georgia Zambrano   Age:  65 y.o.  :  1965                                          MRN:  [id number]         Date:  11/3/2022      Surgeon: Timoteo Tovar):  Abiola Rivera MD    Procedure: Procedure(s):  OPEN REPAIR OF RECURRENT INCISIONAL HERNIA    Medications prior to admission:   Prior to Admission medications    Medication Sig Start Date End Date Taking? Authorizing Provider   Aspirin-Acetaminophen-Caffeine (EXCEDRIN MIGRAINE PO) Take 1 tablet by mouth as needed   Yes Historical Provider, MD   diclofenac sodium (VOLTAREN) 1 % GEL Apply 4 g topically 4 times daily  Patient not taking: No sig reported 22   Yvette Johnson MD   meloxicam (MOBIC) 7.5 MG tablet Take 1 tablet by mouth 2 times daily as needed for Pain  Patient not taking: Reported on 8/15/2022 10/20/21   Amaya Leyva MD   triamcinolone (KENALOG) 0.1 % cream APPLY TOPICALLY TO THE AFFECTED AREA TWICE DAILY  Patient not taking: No sig reported 21   Historical Provider, MD   sodium oxybate (XYREM) 500 MG/ML SOLN solution Take by mouth 2 times daily as needed. Patient not taking: No sig reported    Historical Provider, MD   terbinafine (LAMISIL) 250 MG tablet TAKE 1 TABLET BY MOUTH DAILY  Patient not taking: No sig reported 21   Historical Provider, MD   sodium chloride (ALTAMIST SPRAY) 0.65 % nasal spray 1 spray by Nasal route as needed for Congestion  Patient not taking: No sig reported 9/15/17   Breana Calvillo APRN - CNP   ibuprofen (ADVIL;MOTRIN) 800 MG tablet Take 1 tablet by mouth every 8 hours as needed for Pain 8/21/17 11/3/22  Pb Arcos MD   ascorbic acid (VITAMIN C) 500 MG tablet Take 500 mg by mouth daily.     Historical Provider, MD       Current medications:    Current Facility-Administered Medications   Medication Dose Route Frequency Provider Last Rate Last Admin    ceFAZolin (ANCEF) 2,000 mg in sodium chloride 0.9 % 50 mL IVPB (mini-bag) 2,000 mg IntraVENous Once Jv Shah MD        lidocaine PF 1 % injection 1 mL  1 mL IntraDERmal Once PRN Errol Hickey MD        lactated ringers infusion   IntraVENous Continuous rErol Hickey MD        sodium chloride flush 0.9 % injection 5-40 mL  5-40 mL IntraVENous 2 times per day Errol Hickey MD        sodium chloride flush 0.9 % injection 5-40 mL  5-40 mL IntraVENous PRN Errol Hickey MD        0.9 % sodium chloride infusion   IntraVENous PRN Errol Hickey MD           Allergies:  No Known Allergies    Problem List:    Patient Active Problem List   Diagnosis Code    Narcolepsy G47.419    Cataplexy G47. 56    Varicose veins of both lower extremities I83.93    History of migraine Z86.69    Essential hypertension I10    Plantar fasciitis of right foot M72.2    Incisional hernia, without obstruction or gangrene K43.2    Pain and swelling of right knee M25.561, M25.461       Past Medical History:        Diagnosis Date    Arrhythmia     pt reports intermittent arrhythmias -- Dr. Jordyn Figueroa is cardiologist, doesn't take meds    CAD (coronary artery disease)     Cataplexy     Essential hypertension 09/15/2017    High blood pressure     Migraine     Narcolepsy     takes naps to prevent    Pneumonia     june 2022    Prediabetes     Presence of upper and lower permanent dental bridges     Recurrent incisional hernia     Unspecified sleep apnea     NO CPAP    Varicose veins of lower extremities 10/09/2011       Past Surgical History:        Procedure Laterality Date    COLONOSCOPY      ENDOMETRIAL ABLATION  06/2009    TUBAL LIGATION      tubes tied 13 yrs ago -- 2001    VENTRAL HERNIA REPAIR N/A 12/3/2019    OPEN INCISIONAL HERNIA REPAIR WITH UMBILECTOMY performed by Jv Shah MD at [address]  History:    Social History     Tobacco Use    Smoking status: Never    Smokeless tobacco: Never   Substance Use Topics    Alcohol use: No     Alcohol/week: 0.0 standard drinks                                Counseling given: Not Answered      Vital Signs (Current):   Vitals:    11/03/22 0619 11/03/22 0630   BP: (!) 158/109 (!) 151/98   Pulse: 85    Resp: 15    Temp: 97.4 °F (36.3 °C)    TempSrc: Temporal    SpO2: 98%    Weight: 280 lb 6.4 oz (127.2 kg)    Height: 5' 7.5\" (1.715 m)                                               BP Readings from Last 3 Encounters:   11/03/22 (!) 151/98   10/25/22 126/84   08/11/22 122/80       NPO Status: Time of last liquid consumption: 2330                        Time of last solid consumption: 2330                        Date of last liquid consumption: 11/02/22                        Date of last solid food consumption: 11/02/22    BMI:   Wt Readings from Last 3 Encounters:   11/03/22 280 lb 6.4 oz (127.2 kg)   10/25/22 283 lb (128.4 kg)   08/11/22 285 lb (129.3 kg)     Body mass index is 43.27 kg/m². CBC:   Lab Results   Component Value Date/Time    WBC 3.3 03/03/2022 08:12 AM    RBC 4.52 03/03/2022 08:12 AM    HGB 14.0 03/03/2022 08:12 AM    HCT 42.1 03/03/2022 08:12 AM    MCV 93.2 03/03/2022 08:12 AM    RDW 13.3 03/03/2022 08:12 AM     03/03/2022 08:12 AM       CMP:   Lab Results   Component Value Date/Time     03/03/2022 08:12 AM    K 4.7 03/03/2022 08:12 AM     03/03/2022 08:12 AM    CO2 25 03/03/2022 08:12 AM    BUN 13 03/03/2022 08:12 AM    CREATININE 0.7 03/03/2022 08:12 AM    GFRAA >60 03/03/2022 08:12 AM    GFRAA >60 05/06/2011 12:15 PM    AGRATIO 1.6 03/03/2022 08:12 AM    LABGLOM >60 03/03/2022 08:12 AM    GLUCOSE 115 08/11/2022 11:59 AM    PROT 6.5 03/03/2022 08:12 AM    PROT 7.3 05/06/2011 12:15 PM    CALCIUM 9.6 03/03/2022 08:12 AM    BILITOT <0.2 03/03/2022 08:12 AM    ALKPHOS 73 03/03/2022 08:12 AM    AST 11 03/03/2022 08:12 AM    ALT 17 03/03/2022 08:12 AM       POC Tests: No results for input(s): POCGLU, POCNA, POCK, POCCL, POCBUN, POCHEMO, POCHCT in the last 72 hours.     Coags: No results found for: PROTIME, INR, APTT    HCG (If Applicable):   Lab Results   Component Value Date    PREGTESTUR Negative 11/03/2022        ABGs: No results found for: PHART, PO2ART, MGJ8HKO, KXE5KBY, BEART, I3GZPUXK     Type & Screen (If Applicable):  No results found for: LABABO, LABRH    Drug/Infectious Status (If Applicable):  No results found for: HIV, HEPCAB    COVID-19 Screening (If Applicable): No results found for: COVID19        Anesthesia Evaluation  Patient summary reviewed and Nursing notes reviewed  Airway: Mallampati: III  TM distance: >3 FB   Neck ROM: full  Mouth opening: > = 3 FB   Dental: normal exam         Pulmonary:normal exam    (+) sleep apnea:      (-) pneumonia                           Cardiovascular:    (+) hypertension:, CAD:,       ECG reviewed  Rhythm: regular  Rate: normal           Beta Blocker:  Not on Beta Blocker         Neuro/Psych:   (+) headaches:,              ROS comment: narcolepsy GI/Hepatic/Renal:   (+) morbid obesity          Endo/Other:                     Abdominal:   (+) obese,     Abdomen: soft. Vascular: Other Findings:           Anesthesia Plan      general     ASA 3       Induction: intravenous. MIPS: Postoperative opioids intended and Prophylactic antiemetics administered. Anesthetic plan and risks discussed with patient. Plan discussed with CRNA and attending.                     Robbie Luther DO   11/3/2022

## 2022-11-03 NOTE — PROGRESS NOTES
Department of Surgery  Post Op Note    Subjective: Pt resting in bed calmly. Denies pain,nausea. States she feels thirsty but not hungry. Denies void. Objective:  Anesthesia type: General      I/O    Intra op    Post op     Fluids    1700 ml  ml/hr     EBL  minimal  0 ml     Urine 0 ml 0 ml       Exam: VITALS:  /60   Pulse 88   Temp 96.8 °F (36 °C) (Temporal)   Resp 17   Ht 5' 7.5\" (1.715 m)   Wt 280 lb 6.4 oz (127.2 kg)   LMP 01/03/2022 (Approximate) Comment: Pt states \"Had Period in January 2022\"  SpO2 92%   BMI 43.27 kg/m²   Post-op vital signs:  Stable     Exam:General appearance: alert, appears stated age, and cooperative  Lungs: clear to auscultation bilaterally, IS at bedside  Heart: regular rate and rhythm, S1, S2 normal, no murmur, click, rub or gallop  Abdomen: Soft, appropriately tender, midline abd incision with prineo, abd binder in place. ice      Assessment and Plan  Pt is a 65year old female s/p open repair of recurrent incisional hernia, bilateral transversus abdominus release retrorectus mesh placement 24x24 cm . TAP block.  POD #0    Pain management  FeNa:  Diet - CLD , Fluids LR @ 125ml/hour   :  will continue to monitor  Ambulation: OOB to chair  Respiratory:  IS at bedside, encourage hourly IS and deep breathing  Prophylaxis: DEBBY Roach CNP  11/3/2022  3:39 PM  [employer]serve

## 2022-11-03 NOTE — PROGRESS NOTES
PACU Transfer Note    Vitals:    11/03/22 1600   BP: 120/83   Pulse: 69   Resp: 23   Temp: 96.9 °F (36.1 °C)   SpO2: 96%       In: 2269 [I.V.:2169]  Out: 90 [Drains:90]    Pain assessment:    Pain Level: 3    Report given to Receiving unit RN.    11/3/2022 4:07 PM      abd daughter notified, belongings sent with pt

## 2022-11-03 NOTE — OP NOTE
Operative Note      Patient: James Fenton  YOB: 1965  MRN: [id number]    Date of Procedure: 11/3/2022    Pre-Op Diagnosis: Recurrent incisional hernia [K43.2]    Post-Op Diagnosis: Same       Procedure(s):  OPEN REPAIR OF RECURRENT INCISIONAL HERNIA  BILATERAL TRANSVERSUS ABDOMINUS RELEASE  RETRORECTUS MESH PLACEMENT 24x24 cm    Surgeon(s):  Harshal Gann MD    Assistant:   Resident: Ellen Bates DO    Anesthesia: General    Estimated Blood Loss (mL): 377    Complications: None    Specimens:   ID Type Source Tests Collected by Time Destination   A : HERNIA [facility] Tissue Tissue SURGICAL PATHOLOGY Harshal Gann MD 11/3/2022 1670    B : RESIDUAL MESH Hardware Hardware SURGICAL PATHOLOGY Harshal aGnn MD 11/3/2022 9492        Implants:  Implant Name Type Inv. Item Serial No.  Lot No. LRB No. Used Action   MESH VINOD [id number] INGUINAL POLYPR SQ L PORE MFIL SF - [id number]  MESH VINOD [id number] INGUINAL POLYPR SQ L PORE MFIL SF  BARD DAVOL-WD [id number] N/A 1 Implanted         Drains:   Closed/Suction Drain Medial RLQ Bulb (Active)   Site Description Clean, dry & intact 11/03/22 1510   Dressing Status Clean, dry & intact 11/03/22 1510   Drainage Appearance Serosanguinous 11/03/22 1510   Drain Status To bulb suction 11/03/22 1510   Output (ml) 30 ml 11/03/22 1510       Findings: Large midline incisional hernia containing colon and omentum. Posterior fascia closed with running 0-vicryl x2. Large soft bard mesh in retro-rectus space. Drain in retrorectus space. Indications: Patient is a 73-year-old female previously underwent incisional hernia repair and embolectomy in 2019. She presented to the office about 1 year ago with symptoms of hernia recurrence. She has made some attempts to lose weight, but worsening of symptoms have led to recommendation for repeat operative intervention.   After discussion of indications, risks, benefits, alternatives to surgery, the patient elects to proceed. Detailed Description of Procedure:   After informed consent was obtained, the patient was brought to the operating room and placed on the operating table in the supine position. After induction of general anesthesia and securement of the endotracheal tube, the patient was secured to the operating and antithrombotic confirmed on and functioning. The abdomen was prepped and draped in the standard sterile fashion. A timeout was performed according to hospital protocol. An elliptical incision was planned encompassing the previous midline laparotomy scar. A 15 blade was used to make an incision through the skin overlying the aforementioned markings. Electrocautery was then used to carry this incision down to the subcutaneous tissues. The previous scar was then amputated and passed off the table. Electrocautery was used to continue to open the incision in the midline. This dissection with electrocautery in the midline was continued carefully, focusing on the area above the known hernia sac to midline fascia was encountered. Electrocautery was used to carefully divide the subcutaneous tissues in the midline until the hernia sac was encountered. Using a combination of blunt dissection and electrocautery, the hernia sac was carefully dissected away from the subcutaneous tissues and the fascia. There was colon and omentum clearly visible within the hernia sac. Using gentle pressure these contents were carefully encouraged back into the peritoneal cavity. Once the hernia sac was adequately freed from the surrounding subcutaneous tissues and appropriate border fascia was visible, the hernia sac was lifted away from its contents below and carefully opened. Using a finger finger to protect the contents below, hernia sac was opened for its entire length. Omental adhesions to the hernia sac and fascial edges were encountered.   Using electrocautery to slowly control any visible vessels, these omental attachments were carefully divided. The hernia sac and attached omentum were then passed off the table as a specimen. Attention was then turned to the superior portion of the incision. Ensuring to stay in the midline, the fascia was opened for the length of the entire incision. Additional  old mesh was encountered while opening the midline fascia, and was excised using electrocautery and passed off the field as a specimen. A moist blue towel was then placed within the abdomen in order to protect the bowel and overlying omentum. The retro-muscular space was entered by incising the posterior rectus sheath approximately 0.5 cm just lateral from its edge. The correct plane was confirmed by visualizing the belly of the rectus muscle. The plane was developed with electrocautery and blunt dissection in both cephalad and caudad directions extending well beyond the outer edges of hernia defect. The lateral part of the dissection was then completed, extending to the linea semilunaris. Intercostal nerves and vessels were identified and carefully preserved, as well as the superior epigastric vessels. There was noted to be unacceptable tension when the posterior fascia was approximated a the midline, so the decision was made to proceed with bilateral transversus abdominus release. A location was chosen just medial to the linea semilunaris, and the transversus abdominus muscle was divided for the length of the incision using a combination of blunt dissection and electrocautery, again ensuring protection of neurovascular bundles. Once this was completed on both sides, the posterior fascia was able to come together in the midline without tension. The posterior sheath was approximated and closed with a running 2-0 Vicryl suture. A tailored 24cm x 24cm Large Soft Bard Mesh was brought into the field. This was placed into the retro-rectus space in such a way that the mesh was laying flat, without any kinks or wrinkles. Tisseel Fibrin Sealant was used to fix the mesh in place. A 19 Fr channel drain was inserted through the left side of the abdomen, and placed just over the mesh and secured in place using a prolene suture. The anterior fascia was then closed with running 0 PDS suture x2. The wound was irrigated with sterile saline and hemostasis was ensured with electrocautery. Subcutaneous tissues were then approximated with interrupted deep 3-0 vicryl sutures. The skin was closed with 3-0 Vicryl deep dermal sutures in an inturrupted fashion. The incision was then cleansed and dried and dressed with Prineo. The drain was dressed with a bio-patch and Tegaderm. The patient then underwent TAP blocks per the anesthesia team and was then awakened from anesthesia and brought to the PACU in stable condition. She tolerated the procedure well without any complications. All counts were correct x2 prior to closure. Dr Tj Carrasquillo was present and scrubbed for the entirety of the procedure and directed its course from beginning to end.     Electronically signed by Andrew An DO on 11/3/2022 at 3:35 PM

## 2022-11-03 NOTE — ANESTHESIA POSTPROCEDURE EVALUATION
Department of Anesthesiology  Postprocedure Note    Patient: Georgia Zambrano  MRN: [id number]  YOB: 1965  Date of evaluation: 11/3/2022      Procedure Summary     Date: 11/03/22 Room / Location: [address] [place] [address]    Anesthesia Start: 4533 Anesthesia Stop: 1127    Procedure: OPEN REPAIR OF RECURRENT INCISIONAL HERNIA (Abdomen) Diagnosis:       Recurrent incisional hernia      (Recurrent incisional hernia [K43.2])    Surgeons: Abiola Rivera MD Responsible Provider: Narendra Sandoval DO    Anesthesia Type: general ASA Status: 3          Anesthesia Type: No value filed.     Garland Phase I: Garland Score: 8    Garland Phase II:        Anesthesia Post Evaluation    Patient location during evaluation: PACU  Patient participation: complete - patient participated  Level of consciousness: awake  Pain score: 0  Airway patency: patent  Nausea & Vomiting: no nausea and no vomiting  Complications: no  Cardiovascular status: blood pressure returned to baseline  Respiratory status: acceptable  Hydration status: euvolemic  Multimodal analgesia pain management approach

## 2022-11-03 NOTE — PROGRESS NOTES
Patient admitted to PACU # 13 from OR at 1130 post 42 [address] Thai per Dr. Marie Padron. Attached to PACU monitoring system and report received from anesthesia provider. Patient was reported to be hemodynamically stable during procedure. Patient drowsy on admission with no signs of denied pain.

## 2022-11-03 NOTE — ANESTHESIA PROCEDURE NOTES
Peripheral Block    Patient location during procedure: OR  Reason for block: procedure for pain, post-op pain management and at surgeon's request  Start time: 11/3/2022 11:09 AM  End time: 11/3/2022 11:16 AM  Staffing  Performed: anesthesiologist   Anesthesiologist: Reginaldo Braun,   Preanesthetic Checklist  Completed: patient identified, IV checked, site marked, risks and benefits discussed, surgical/procedural consents, equipment checked, pre-op evaluation, timeout performed, anesthesia consent given, oxygen available, monitors applied/VS acknowledged, fire risk safety assessment completed and verbalized and blood product R/B/A discussed and consented  Peripheral Block   Patient position: supine  Prep: ChloraPrep  Provider prep: mask  Patient monitoring: cardiac monitor, continuous pulse ox, continuous capnometry, frequent blood pressure checks, oxygen and IV access  Block type: TAP  Laterality: bilateral  Injection technique: single-shot  Guidance: ultrasound guided    Needle   Needle type: insulated echogenic nerve stimulator needle   Needle gauge: 20 G  Needle localization: ultrasound guidance  Needle length: 8 cm  Assessment   Injection assessment: negative aspiration for heme, no paresthesia on injection, local visualized surrounding nerve on ultrasound and no intravascular symptoms  Paresthesia pain: none  Slow fractionated injection: yes  Hemodynamics: stable  Real-time US image taken/store: yes  Outcomes: uncomplicated and patient tolerated procedure well    Additional Notes  0.25% 60mL bupiv

## 2022-11-03 NOTE — PROGRESS NOTES
Pt alert and oriented x4. VSS with exception to decreased BP. MD aware. Pt ambulated to restroom, became lightheaded, bright red blood noted at incision site. MD notified and arrived to bedside once patient was back in bed. EKG ordered and completed. X-ray ordered as well. Dressing applied to midline abd incision. Patient resting in bed quietly. No other needs at this time.     Electronically signed by Raleigh Spatz, RN on 11/3/2022 at 8:02 PM

## 2022-11-04 LAB
ALBUMIN SERPL-MCNC: 3.6 G/DL (ref 3.4–5)
ANION GAP SERPL CALCULATED.3IONS-SCNC: 7 MMOL/L (ref 3–16)
BASOPHILS ABSOLUTE: 0 K/UL (ref 0–0.2)
BASOPHILS RELATIVE PERCENT: 0.4 %
BUN BLDV-MCNC: 11 MG/DL (ref 7–20)
CALCIUM SERPL-MCNC: 9.3 MG/DL (ref 8.3–10.6)
CHLORIDE BLD-SCNC: 104 MMOL/L (ref 99–110)
CO2: 27 MMOL/L (ref 21–32)
CREAT SERPL-MCNC: 0.7 MG/DL (ref 0.6–1.1)
EOSINOPHILS ABSOLUTE: 0 K/UL (ref 0–0.6)
EOSINOPHILS RELATIVE PERCENT: 0 %
GFR SERPL CREATININE-BSD FRML MDRD: >60 ML/MIN/{1.73_M2}
GLUCOSE BLD-MCNC: 132 MG/DL (ref 70–99)
HCT VFR BLD CALC: 35.3 % (ref 36–48)
HEMOGLOBIN: 12 G/DL (ref 12–16)
LYMPHOCYTES ABSOLUTE: 0.7 K/UL (ref 1–5.1)
LYMPHOCYTES RELATIVE PERCENT: 12.2 %
MAGNESIUM: 1.8 MG/DL (ref 1.8–2.4)
MCH RBC QN AUTO: 31.7 PG (ref 26–34)
MCHC RBC AUTO-ENTMCNC: 34.1 G/DL (ref 31–36)
MCV RBC AUTO: 93 FL (ref 80–100)
MONOCYTES ABSOLUTE: 0.6 K/UL (ref 0–1.3)
MONOCYTES RELATIVE PERCENT: 9.4 %
NEUTROPHILS ABSOLUTE: 4.7 K/UL (ref 1.7–7.7)
NEUTROPHILS RELATIVE PERCENT: 78 %
PDW BLD-RTO: 13.6 % (ref 12.4–15.4)
PHOSPHORUS: 3.1 MG/DL (ref 2.5–4.9)
PLATELET # BLD: 166 K/UL (ref 135–450)
PMV BLD AUTO: 9.1 FL (ref 5–10.5)
POTASSIUM SERPL-SCNC: 4.5 MMOL/L (ref 3.5–5.1)
RBC # BLD: 3.79 M/UL (ref 4–5.2)
SODIUM BLD-SCNC: 138 MMOL/L (ref 136–145)
WBC # BLD: 6.1 K/UL (ref 4–11)

## 2022-11-04 PROCEDURE — 36415 COLL VENOUS BLD VENIPUNCTURE: CPT

## 2022-11-04 PROCEDURE — 6370000000 HC RX 637 (ALT 250 FOR IP): Performed by: STUDENT IN AN ORGANIZED HEALTH CARE EDUCATION/TRAINING PROGRAM

## 2022-11-04 PROCEDURE — 6360000002 HC RX W HCPCS

## 2022-11-04 PROCEDURE — 83735 ASSAY OF MAGNESIUM: CPT

## 2022-11-04 PROCEDURE — 85025 COMPLETE CBC W/AUTO DIFF WBC: CPT

## 2022-11-04 PROCEDURE — 6370000000 HC RX 637 (ALT 250 FOR IP)

## 2022-11-04 PROCEDURE — 80069 RENAL FUNCTION PANEL: CPT

## 2022-11-04 PROCEDURE — 99024 POSTOP FOLLOW-UP VISIT: CPT | Performed by: SURGERY

## 2022-11-04 PROCEDURE — 1200000000 HC SEMI PRIVATE

## 2022-11-04 PROCEDURE — 2580000003 HC RX 258

## 2022-11-04 PROCEDURE — 6360000002 HC RX W HCPCS: Performed by: STUDENT IN AN ORGANIZED HEALTH CARE EDUCATION/TRAINING PROGRAM

## 2022-11-04 RX ORDER — MAGNESIUM SULFATE 1 G/100ML
1000 INJECTION INTRAVENOUS ONCE
Status: COMPLETED | OUTPATIENT
Start: 2022-11-04 | End: 2022-11-04

## 2022-11-04 RX ORDER — DOCUSATE SODIUM 100 MG/1
100 CAPSULE, LIQUID FILLED ORAL DAILY
Status: DISCONTINUED | OUTPATIENT
Start: 2022-11-04 | End: 2022-11-07

## 2022-11-04 RX ADMIN — OXYCODONE 5 MG: 5 TABLET ORAL at 18:07

## 2022-11-04 RX ADMIN — OXYCODONE 5 MG: 5 TABLET ORAL at 13:28

## 2022-11-04 RX ADMIN — SODIUM CHLORIDE, POTASSIUM CHLORIDE, SODIUM LACTATE AND CALCIUM CHLORIDE: 600; 310; 30; 20 INJECTION, SOLUTION INTRAVENOUS at 07:30

## 2022-11-04 RX ADMIN — ENOXAPARIN SODIUM 30 MG: 100 INJECTION SUBCUTANEOUS at 21:17

## 2022-11-04 RX ADMIN — ENOXAPARIN SODIUM 30 MG: 100 INJECTION SUBCUTANEOUS at 08:51

## 2022-11-04 RX ADMIN — OXYCODONE 5 MG: 5 TABLET ORAL at 22:44

## 2022-11-04 RX ADMIN — DOCUSATE SODIUM 100 MG: 100 CAPSULE, LIQUID FILLED ORAL at 11:12

## 2022-11-04 RX ADMIN — SODIUM CHLORIDE, POTASSIUM CHLORIDE, SODIUM LACTATE AND CALCIUM CHLORIDE: 600; 310; 30; 20 INJECTION, SOLUTION INTRAVENOUS at 21:24

## 2022-11-04 RX ADMIN — MAGNESIUM SULFATE HEPTAHYDRATE 1000 MG: 1 INJECTION, SOLUTION INTRAVENOUS at 08:51

## 2022-11-04 RX ADMIN — ACETAMINOPHEN 1000 MG: 500 TABLET ORAL at 20:50

## 2022-11-04 RX ADMIN — ACETAMINOPHEN 1000 MG: 500 TABLET ORAL at 07:45

## 2022-11-04 RX ADMIN — SODIUM CHLORIDE, POTASSIUM CHLORIDE, SODIUM LACTATE AND CALCIUM CHLORIDE: 600; 310; 30; 20 INJECTION, SOLUTION INTRAVENOUS at 14:36

## 2022-11-04 RX ADMIN — HYDROMORPHONE HYDROCHLORIDE 0.25 MG: 1 INJECTION, SOLUTION INTRAMUSCULAR; INTRAVENOUS; SUBCUTANEOUS at 02:08

## 2022-11-04 ASSESSMENT — PAIN DESCRIPTION - ORIENTATION
ORIENTATION: RIGHT
ORIENTATION: MID
ORIENTATION: MID

## 2022-11-04 ASSESSMENT — PAIN SCALES - GENERAL
PAINLEVEL_OUTOF10: 3
PAINLEVEL_OUTOF10: 6
PAINLEVEL_OUTOF10: 6
PAINLEVEL_OUTOF10: 4
PAINLEVEL_OUTOF10: 6
PAINLEVEL_OUTOF10: 6
PAINLEVEL_OUTOF10: 4
PAINLEVEL_OUTOF10: 5

## 2022-11-04 ASSESSMENT — PAIN DESCRIPTION - LOCATION
LOCATION: ABDOMEN

## 2022-11-04 ASSESSMENT — PAIN DESCRIPTION - DESCRIPTORS
DESCRIPTORS: ACHING

## 2022-11-04 NOTE — CARE COORDINATION
CM Note:  CM met with pt and  today at bedside. Pt is from home with  and will return home at discharge,  to provide transportation. Pt and  reported no HCC, DME or CM needs at discharge. Pt is not at high risk for readmission, has not recently been admitted, and there is no active consult for Case Management services.  will sign off on this pt at this time. If needs arise, please consult Case Management services.     Risk of Readmission Score 6%

## 2022-11-04 NOTE — PROGRESS NOTES
Surgery   Daily Progress Note  Patient: Anastasia [place]    CC: Recurrent incisional hernia    SUBJECTIVE:  Patient had mild tachycardia and hypotension, feeling dizzy while standing. HDS this am, no n/v, tolerating clears. ROS: A 14 point review of systems was conducted, significant findings as noted above. All other systems negative. OBJECTIVE:   Infusions:   sodium chloride      lactated ringers 125 mL/hr at 11/03/22 2252      I/O:I/O last 3 completed shifts: In: 2269 [I.V.:2169; IV Piggyback:100]  Out: 620 [Urine:500; Drains:120]           Wt Readings from Last 1 Encounters:   11/03/22 280 lb 6.4 oz (127.2 kg)       Exam:  BP 98/66   Pulse 93   Temp 98.6 °F (37 °C) (Oral)   Resp 17   Ht 5' 7.5\" (1.715 m)   Wt 280 lb 6.4 oz (127.2 kg)   LMP 01/03/2022 (Approximate) Comment: Pt states \"Had Period in January 2022\"  SpO2 94%   BMI 43.27 kg/m²     General Appearance: no apparent distress   Neuro: A&Ox3, no focal deficits  Lungs: Normal effort; no adventitious breath sounds  Heart: Regular rate and rhythm  Abdomen: Soft, appropriately tender, midline abd incision with prineo, abd binder in place. LINDA drain with SS  Extremities: No edema, no cyanosis            LABS:   Recent Labs     11/03/22 1902 11/04/22  0532   WBC 9.8 6.1   HGB 14.0 12.0   HCT 41.4 35.3*   MCV 93.5 93.0    166        Recent Labs     11/03/22 1902 11/04/22  0532    138   K 5.1 4.5    104   CO2 20* 27   PHOS 3.8 3.1   BUN 12 11   CREATININE 0.7 0.7      No results for input(s): AST, ALT, ALB, BILIDIR, BILITOT, ALKPHOS in the last 72 hours. No results for input(s): LIPASE, AMYLASE in the last 72 hours. No results for input(s): PROT, INR, APTT in the last 72 hours. Recent Labs     11/03/22 1902   TROPONINI 0.01       ASSESSMENT/PLAN:   Pt is a 65year old female s/p open repair of recurrent incisional hernia, bilateral transversus abdominus release retrorectus mesh placement 24x24 cm w/ TAP block.  POD #1    - Cont CLD  - Increase fluids to 150  - Will check orthostatics if continues to feel dizzy  - Cont DVT PPx      Brie Marques, DO   PGY1, General Surgery  11/04/22  6:37 AM  Pager # [phone]    I have seen, examined, and reviewed the patients chart. I agree with the residents assessment and have made appropriate changes.     Jasvir Mirza

## 2022-11-04 NOTE — PROGRESS NOTES
Pt up to bathroom overnight w/ pardeep steady, while on toilet after voiding Pt c/o nausea and stated \"I feel like I'm going to pass out\". Pt immediately returned to bed, BP stable, Pt given prn Zofran. Pt reported feeling better and denying nausea and dizziness. Surgical incision draining small amount of serosanguineous fluids, maya ABD pads placed and abdominal binder in place over pads. Surgical resident updated. Call light and bedside table within reach. Bed alarm on, wheels locked, and bed in lowest position.

## 2022-11-04 NOTE — PROGRESS NOTES
Pt A&O x4. VSS. Pain being managed by PRN pain medication. No complaints of nausea. Tolerated clear liquid tray well. Purewick draining clear yellow urine. LINDA clean dry and intact. Pt OOB and in chair most of shift, tolerated well. Pt resting in bed with no other needs at this time.     Electronically signed by Tai Zepeda RN on 11/4/2022 at 7:52 PM

## 2022-11-04 NOTE — PLAN OF CARE
Problem: Discharge Planning  Goal: Discharge to home or other facility with appropriate resources  Outcome: Progressing     Problem: Pain  Goal: Verbalizes/displays adequate comfort level or baseline comfort level  Outcome: Progressing  Flowsheets (Taken 11/3/2022 2218)  Verbalizes/displays adequate comfort level or baseline comfort level:   Encourage patient to monitor pain and request assistance   Assess pain using appropriate pain scale   Administer analgesics based on type and severity of pain and evaluate response   Implement non-pharmacological measures as appropriate and evaluate response   Consider cultural and social influences on pain and pain management   Notify Licensed Independent Practitioner if interventions unsuccessful or patient reports new pain  Note: Pt given prn IV Dilaudid w/ benefit. Fresh ice pack applied to abdomen over surgery site for patient comfort.       Problem: Safety - Adult  Goal: Free from fall injury  Outcome: Progressing     Problem: ABCDS Injury Assessment  Goal: Absence of physical injury  Outcome: Progressing

## 2022-11-04 NOTE — PROGRESS NOTES
4 Eyes Admission Assessment     I agree as the admission nurse that 2 RN's have performed a thorough Head to Toe Skin Assessment on the patient. ALL assessment sites listed below have been assessed on admission. Areas assessed by both nurses:   [x]   Head, Face, and Ears   [x]   Shoulders, Back, and Chest  [x]   Arms, Elbows, and Hands   [x]   Coccyx, Sacrum, and Ischium  [x]   Legs, Feet, and Heels        Does the Patient have Skin Breakdown?   No       Midline incision for surgery  Ivan Prevention initiated:  No   Wound Care Orders initiated:  No      [facility] nurse consulted for Pressure Injury (Stage 3,4, Unstageable, DTI, NWPT, and Complex wounds) or Ivan score 18 or lower:  No      Nurse 1 eSignature: Electronically signed by Tai Zepeda RN on 11/3/22 at 8:05 PM EDT    **SHARE this note so that the co-signing nurse is able to place an eSignature**    Nurse 2 eSignature: Electronically signed by Tiffany Garcia RN on 11/3/22 at 8:06 PM EDT

## 2022-11-05 LAB
ALBUMIN SERPL-MCNC: 3.2 G/DL (ref 3.4–5)
ANION GAP SERPL CALCULATED.3IONS-SCNC: 7 MMOL/L (ref 3–16)
BASOPHILS ABSOLUTE: 0 K/UL (ref 0–0.2)
BASOPHILS RELATIVE PERCENT: 0.2 %
BUN BLDV-MCNC: 8 MG/DL (ref 7–20)
CALCIUM SERPL-MCNC: 9.1 MG/DL (ref 8.3–10.6)
CHLORIDE BLD-SCNC: 102 MMOL/L (ref 99–110)
CO2: 26 MMOL/L (ref 21–32)
CREAT SERPL-MCNC: 0.6 MG/DL (ref 0.6–1.1)
EKG ATRIAL RATE: 66 BPM
EKG DIAGNOSIS: NORMAL
EKG P AXIS: 52 DEGREES
EKG P-R INTERVAL: 136 MS
EKG Q-T INTERVAL: 364 MS
EKG QRS DURATION: 82 MS
EKG QTC CALCULATION (BAZETT): 381 MS
EKG R AXIS: 50 DEGREES
EKG T AXIS: 17 DEGREES
EKG VENTRICULAR RATE: 66 BPM
EOSINOPHILS ABSOLUTE: 0 K/UL (ref 0–0.6)
EOSINOPHILS RELATIVE PERCENT: 0.1 %
GFR SERPL CREATININE-BSD FRML MDRD: >60 ML/MIN/{1.73_M2}
GLUCOSE BLD-MCNC: 104 MG/DL (ref 70–99)
HCT VFR BLD CALC: 31.2 % (ref 36–48)
HEMOGLOBIN: 10.4 G/DL (ref 12–16)
LYMPHOCYTES ABSOLUTE: 1.1 K/UL (ref 1–5.1)
LYMPHOCYTES RELATIVE PERCENT: 17.5 %
MAGNESIUM: 1.8 MG/DL (ref 1.8–2.4)
MCH RBC QN AUTO: 31.3 PG (ref 26–34)
MCHC RBC AUTO-ENTMCNC: 33.4 G/DL (ref 31–36)
MCV RBC AUTO: 93.5 FL (ref 80–100)
MONOCYTES ABSOLUTE: 0.5 K/UL (ref 0–1.3)
MONOCYTES RELATIVE PERCENT: 8.7 %
NEUTROPHILS ABSOLUTE: 4.5 K/UL (ref 1.7–7.7)
NEUTROPHILS RELATIVE PERCENT: 73.5 %
PDW BLD-RTO: 13.4 % (ref 12.4–15.4)
PHOSPHORUS: 1.7 MG/DL (ref 2.5–4.9)
PLATELET # BLD: 136 K/UL (ref 135–450)
PMV BLD AUTO: 9.2 FL (ref 5–10.5)
POTASSIUM SERPL-SCNC: 4.1 MMOL/L (ref 3.5–5.1)
RBC # BLD: 3.34 M/UL (ref 4–5.2)
SODIUM BLD-SCNC: 135 MMOL/L (ref 136–145)
WBC # BLD: 6.1 K/UL (ref 4–11)

## 2022-11-05 PROCEDURE — 93010 ELECTROCARDIOGRAM REPORT: CPT | Performed by: INTERNAL MEDICINE

## 2022-11-05 PROCEDURE — 80069 RENAL FUNCTION PANEL: CPT

## 2022-11-05 PROCEDURE — 85025 COMPLETE CBC W/AUTO DIFF WBC: CPT

## 2022-11-05 PROCEDURE — 6370000000 HC RX 637 (ALT 250 FOR IP): Performed by: STUDENT IN AN ORGANIZED HEALTH CARE EDUCATION/TRAINING PROGRAM

## 2022-11-05 PROCEDURE — 99024 POSTOP FOLLOW-UP VISIT: CPT | Performed by: SURGERY

## 2022-11-05 PROCEDURE — 2580000003 HC RX 258

## 2022-11-05 PROCEDURE — 6360000002 HC RX W HCPCS: Performed by: STUDENT IN AN ORGANIZED HEALTH CARE EDUCATION/TRAINING PROGRAM

## 2022-11-05 PROCEDURE — 2580000003 HC RX 258: Performed by: STUDENT IN AN ORGANIZED HEALTH CARE EDUCATION/TRAINING PROGRAM

## 2022-11-05 PROCEDURE — 2500000003 HC RX 250 WO HCPCS: Performed by: STUDENT IN AN ORGANIZED HEALTH CARE EDUCATION/TRAINING PROGRAM

## 2022-11-05 PROCEDURE — 83735 ASSAY OF MAGNESIUM: CPT

## 2022-11-05 PROCEDURE — 1200000000 HC SEMI PRIVATE

## 2022-11-05 PROCEDURE — 36415 COLL VENOUS BLD VENIPUNCTURE: CPT

## 2022-11-05 PROCEDURE — 6370000000 HC RX 637 (ALT 250 FOR IP)

## 2022-11-05 RX ORDER — METOCLOPRAMIDE HYDROCHLORIDE 5 MG/ML
5 INJECTION INTRAMUSCULAR; INTRAVENOUS EVERY 6 HOURS
Status: DISCONTINUED | OUTPATIENT
Start: 2022-11-05 | End: 2022-11-08 | Stop reason: HOSPADM

## 2022-11-05 RX ADMIN — ACETAMINOPHEN 500 MG: 500 TABLET ORAL at 09:17

## 2022-11-05 RX ADMIN — ENOXAPARIN SODIUM 30 MG: 100 INJECTION SUBCUTANEOUS at 21:59

## 2022-11-05 RX ADMIN — DOCUSATE SODIUM 100 MG: 100 CAPSULE, LIQUID FILLED ORAL at 09:18

## 2022-11-05 RX ADMIN — SODIUM CHLORIDE, POTASSIUM CHLORIDE, SODIUM LACTATE AND CALCIUM CHLORIDE: 600; 310; 30; 20 INJECTION, SOLUTION INTRAVENOUS at 11:49

## 2022-11-05 RX ADMIN — OXYCODONE 5 MG: 5 TABLET ORAL at 21:59

## 2022-11-05 RX ADMIN — OXYCODONE 10 MG: 5 TABLET ORAL at 06:21

## 2022-11-05 RX ADMIN — METOCLOPRAMIDE HYDROCHLORIDE 5 MG: 5 INJECTION INTRAMUSCULAR; INTRAVENOUS at 16:18

## 2022-11-05 RX ADMIN — SODIUM PHOSPHATE, MONOBASIC, MONOHYDRATE 10 MMOL: 276; 142 INJECTION, SOLUTION INTRAVENOUS at 16:25

## 2022-11-05 RX ADMIN — ACETAMINOPHEN 1000 MG: 500 TABLET ORAL at 02:31

## 2022-11-05 RX ADMIN — SODIUM CHLORIDE, PRESERVATIVE FREE 10 ML: 5 INJECTION INTRAVENOUS at 09:18

## 2022-11-05 RX ADMIN — OXYCODONE 10 MG: 5 TABLET ORAL at 15:14

## 2022-11-05 RX ADMIN — ENOXAPARIN SODIUM 30 MG: 100 INJECTION SUBCUTANEOUS at 09:18

## 2022-11-05 RX ADMIN — SODIUM CHLORIDE, POTASSIUM CHLORIDE, SODIUM LACTATE AND CALCIUM CHLORIDE: 600; 310; 30; 20 INJECTION, SOLUTION INTRAVENOUS at 03:03

## 2022-11-05 RX ADMIN — METOCLOPRAMIDE HYDROCHLORIDE 5 MG: 5 INJECTION INTRAMUSCULAR; INTRAVENOUS at 21:59

## 2022-11-05 RX ADMIN — DIBASIC SODIUM PHOSPHATE, MONOBASIC POTASSIUM PHOSPHATE AND MONOBASIC SODIUM PHOSPHATE 2 TABLET: 852; 155; 130 TABLET ORAL at 12:09

## 2022-11-05 RX ADMIN — METOCLOPRAMIDE HYDROCHLORIDE 5 MG: 5 INJECTION INTRAMUSCULAR; INTRAVENOUS at 11:16

## 2022-11-05 RX ADMIN — ACETAMINOPHEN 500 MG: 500 TABLET ORAL at 21:59

## 2022-11-05 ASSESSMENT — PAIN DESCRIPTION - ORIENTATION: ORIENTATION: MID

## 2022-11-05 ASSESSMENT — PAIN DESCRIPTION - LOCATION
LOCATION: ABDOMEN

## 2022-11-05 ASSESSMENT — PAIN SCALES - GENERAL
PAINLEVEL_OUTOF10: 0
PAINLEVEL_OUTOF10: 3
PAINLEVEL_OUTOF10: 2
PAINLEVEL_OUTOF10: 7
PAINLEVEL_OUTOF10: 2

## 2022-11-05 ASSESSMENT — PAIN DESCRIPTION - DESCRIPTORS
DESCRIPTORS: ACHING
DESCRIPTORS: ACHING
DESCRIPTORS: SORE

## 2022-11-05 ASSESSMENT — PAIN DESCRIPTION - PAIN TYPE: TYPE: SURGICAL PAIN

## 2022-11-05 ASSESSMENT — PAIN DESCRIPTION - FREQUENCY: FREQUENCY: INTERMITTENT

## 2022-11-05 NOTE — PROGRESS NOTES
pt. has refused Robaxin iv for 5 times since yesterday. Written education material provided without effect. Dr. Santiago Hand aware.

## 2022-11-05 NOTE — PROGRESS NOTES
A&O x4. Pain controlled well by PRN and routine pain medication, pt satisfied. Denied nausea. Encouraged oral liquid as tolerated. Purewick draining clear yellow urine. LINDA clean dry and intact and patent. Encouraged pt to use IS and taking deep breath when awake.  /73   Pulse (!) 102   Temp 99.3 °F (37.4 °C) (Oral)   Resp 16   Ht 5' 7.5\" (1.715 m)   Wt 280 lb 6.4 oz (127.2 kg)   LMP 01/03/2022 (Approximate) Comment: Pt states \"Had Period in January 2022\"  SpO2 95%   BMI 43.27 kg/m²

## 2022-11-05 NOTE — PLAN OF CARE
Pt encouraged to use call light to notify staff when pain rises beyond tolerable. Pt educated on pain scale and was using call light appropriately.

## 2022-11-05 NOTE — PROGRESS NOTES
Patient A&Ox4, VSS. Surgical incision dry and intact, abdominal binder in place for comfort. Pain managed with oral medications. Patient denies nausea, tolerating clears well. Patient ambulating in room and hallways with walker and GB, tolerating fairly well. Patient instructed to call out for needs. Will continue to monitor.

## 2022-11-05 NOTE — PLAN OF CARE
Problem: Discharge Planning  Goal: Discharge to home or other facility with appropriate resources  Outcome: Progressing     Problem: Pain  Goal: Verbalizes/displays adequate comfort level or baseline comfort level  11/5/2022 1520 by Velasquez Velasquez RN  Outcome: Progressing     Problem: Safety - Adult  Goal: Free from fall injury  11/5/2022 1520 by Velasquez Velasquez RN  Outcome: Progressing

## 2022-11-05 NOTE — PROGRESS NOTES
placement 24x24 cm w/ TAP block. POD #2    -Tolerating clears, awaiting return of bowel function prior to   - Encourage OOB and ambulation   - SLIV as urine output adequate  - Dispo home pending diet and mobility advancement     Shashi Ardon DO  PGY1, General Surgery  11/05/22  7:09 AM    I am post-call today and will not be on campus. Please contact Dr. Cyril Ring at [phone] or Dr. Sommer Mills at [phone] for questions or concerns regarding this patient. I have seen, examined, and reviewed the patients chart. I agree with the residents assessment and have made appropriate changes.     Alec Contreras

## 2022-11-06 LAB
ALBUMIN SERPL-MCNC: 3.1 G/DL (ref 3.4–5)
ANION GAP SERPL CALCULATED.3IONS-SCNC: 11 MMOL/L (ref 3–16)
BASOPHILS ABSOLUTE: 0 K/UL (ref 0–0.2)
BASOPHILS RELATIVE PERCENT: 0.5 %
BUN BLDV-MCNC: 7 MG/DL (ref 7–20)
CALCIUM SERPL-MCNC: 8.7 MG/DL (ref 8.3–10.6)
CHLORIDE BLD-SCNC: 102 MMOL/L (ref 99–110)
CO2: 23 MMOL/L (ref 21–32)
CREAT SERPL-MCNC: 0.5 MG/DL (ref 0.6–1.1)
EOSINOPHILS ABSOLUTE: 0 K/UL (ref 0–0.6)
EOSINOPHILS RELATIVE PERCENT: 0.5 %
GFR SERPL CREATININE-BSD FRML MDRD: >60 ML/MIN/{1.73_M2}
GLUCOSE BLD-MCNC: 92 MG/DL (ref 70–99)
HCT VFR BLD CALC: 30.1 % (ref 36–48)
HEMOGLOBIN: 9.9 G/DL (ref 12–16)
LYMPHOCYTES ABSOLUTE: 1 K/UL (ref 1–5.1)
LYMPHOCYTES RELATIVE PERCENT: 19 %
MAGNESIUM: 1.8 MG/DL (ref 1.8–2.4)
MCH RBC QN AUTO: 31.4 PG (ref 26–34)
MCHC RBC AUTO-ENTMCNC: 32.8 G/DL (ref 31–36)
MCV RBC AUTO: 95.9 FL (ref 80–100)
MONOCYTES ABSOLUTE: 0.4 K/UL (ref 0–1.3)
MONOCYTES RELATIVE PERCENT: 8 %
NEUTROPHILS ABSOLUTE: 3.7 K/UL (ref 1.7–7.7)
NEUTROPHILS RELATIVE PERCENT: 72 %
PDW BLD-RTO: 13.4 % (ref 12.4–15.4)
PHOSPHORUS: 2.3 MG/DL (ref 2.5–4.9)
PLATELET # BLD: 135 K/UL (ref 135–450)
PMV BLD AUTO: 8.6 FL (ref 5–10.5)
POTASSIUM SERPL-SCNC: 4.1 MMOL/L (ref 3.5–5.1)
RBC # BLD: 3.14 M/UL (ref 4–5.2)
SODIUM BLD-SCNC: 136 MMOL/L (ref 136–145)
WBC # BLD: 5.1 K/UL (ref 4–11)

## 2022-11-06 PROCEDURE — 6370000000 HC RX 637 (ALT 250 FOR IP)

## 2022-11-06 PROCEDURE — 83735 ASSAY OF MAGNESIUM: CPT

## 2022-11-06 PROCEDURE — 99024 POSTOP FOLLOW-UP VISIT: CPT | Performed by: SURGERY

## 2022-11-06 PROCEDURE — 2580000003 HC RX 258: Performed by: STUDENT IN AN ORGANIZED HEALTH CARE EDUCATION/TRAINING PROGRAM

## 2022-11-06 PROCEDURE — 36415 COLL VENOUS BLD VENIPUNCTURE: CPT

## 2022-11-06 PROCEDURE — 2580000003 HC RX 258

## 2022-11-06 PROCEDURE — 6370000000 HC RX 637 (ALT 250 FOR IP): Performed by: STUDENT IN AN ORGANIZED HEALTH CARE EDUCATION/TRAINING PROGRAM

## 2022-11-06 PROCEDURE — 6360000002 HC RX W HCPCS

## 2022-11-06 PROCEDURE — 85025 COMPLETE CBC W/AUTO DIFF WBC: CPT

## 2022-11-06 PROCEDURE — 1200000000 HC SEMI PRIVATE

## 2022-11-06 PROCEDURE — 80069 RENAL FUNCTION PANEL: CPT

## 2022-11-06 PROCEDURE — 6360000002 HC RX W HCPCS: Performed by: STUDENT IN AN ORGANIZED HEALTH CARE EDUCATION/TRAINING PROGRAM

## 2022-11-06 RX ORDER — MAGNESIUM SULFATE IN WATER 40 MG/ML
2000 INJECTION, SOLUTION INTRAVENOUS ONCE
Status: COMPLETED | OUTPATIENT
Start: 2022-11-06 | End: 2022-11-06

## 2022-11-06 RX ADMIN — ACETAMINOPHEN 500 MG: 500 TABLET ORAL at 07:09

## 2022-11-06 RX ADMIN — SODIUM CHLORIDE, PRESERVATIVE FREE 10 ML: 5 INJECTION INTRAVENOUS at 22:11

## 2022-11-06 RX ADMIN — DIBASIC SODIUM PHOSPHATE, MONOBASIC POTASSIUM PHOSPHATE AND MONOBASIC SODIUM PHOSPHATE 2 TABLET: 852; 155; 130 TABLET ORAL at 10:33

## 2022-11-06 RX ADMIN — OXYCODONE 5 MG: 5 TABLET ORAL at 07:09

## 2022-11-06 RX ADMIN — METOCLOPRAMIDE HYDROCHLORIDE 5 MG: 5 INJECTION INTRAMUSCULAR; INTRAVENOUS at 06:34

## 2022-11-06 RX ADMIN — ENOXAPARIN SODIUM 30 MG: 100 INJECTION SUBCUTANEOUS at 22:10

## 2022-11-06 RX ADMIN — SODIUM CHLORIDE, POTASSIUM CHLORIDE, SODIUM LACTATE AND CALCIUM CHLORIDE: 600; 310; 30; 20 INJECTION, SOLUTION INTRAVENOUS at 04:18

## 2022-11-06 RX ADMIN — OXYCODONE 5 MG: 5 TABLET ORAL at 17:20

## 2022-11-06 RX ADMIN — METOCLOPRAMIDE HYDROCHLORIDE 5 MG: 5 INJECTION INTRAMUSCULAR; INTRAVENOUS at 22:10

## 2022-11-06 RX ADMIN — ENOXAPARIN SODIUM 30 MG: 100 INJECTION SUBCUTANEOUS at 10:33

## 2022-11-06 RX ADMIN — METOCLOPRAMIDE HYDROCHLORIDE 5 MG: 5 INJECTION INTRAMUSCULAR; INTRAVENOUS at 10:33

## 2022-11-06 RX ADMIN — SODIUM CHLORIDE, PRESERVATIVE FREE 10 ML: 5 INJECTION INTRAVENOUS at 10:33

## 2022-11-06 RX ADMIN — ACETAMINOPHEN 500 MG: 500 TABLET ORAL at 17:25

## 2022-11-06 RX ADMIN — DOCUSATE SODIUM 100 MG: 100 CAPSULE, LIQUID FILLED ORAL at 10:33

## 2022-11-06 RX ADMIN — MAGNESIUM SULFATE HEPTAHYDRATE 2000 MG: 40 INJECTION, SOLUTION INTRAVENOUS at 13:22

## 2022-11-06 RX ADMIN — METOCLOPRAMIDE HYDROCHLORIDE 5 MG: 5 INJECTION INTRAMUSCULAR; INTRAVENOUS at 17:19

## 2022-11-06 ASSESSMENT — PAIN SCALES - GENERAL
PAINLEVEL_OUTOF10: 0
PAINLEVEL_OUTOF10: 3
PAINLEVEL_OUTOF10: 2
PAINLEVEL_OUTOF10: 5
PAINLEVEL_OUTOF10: 5
PAINLEVEL_OUTOF10: 2

## 2022-11-06 ASSESSMENT — PAIN DESCRIPTION - LOCATION
LOCATION: ABDOMEN

## 2022-11-06 ASSESSMENT — PAIN DESCRIPTION - DESCRIPTORS
DESCRIPTORS: ACHING;SORE
DESCRIPTORS: SORE
DESCRIPTORS: SORE

## 2022-11-06 ASSESSMENT — PAIN DESCRIPTION - PAIN TYPE: TYPE: SURGICAL PAIN

## 2022-11-06 ASSESSMENT — PAIN DESCRIPTION - FREQUENCY: FREQUENCY: INTERMITTENT

## 2022-11-06 ASSESSMENT — PAIN DESCRIPTION - ORIENTATION: ORIENTATION: MID

## 2022-11-06 NOTE — PLAN OF CARE
Problem: Pain  Goal: Verbalizes/displays adequate comfort level or baseline comfort level  Outcome: Progressing  Pt reports abd pain 3/10. Medicated with prn oxycodone and scheduled tylenol as needed. Pt voices relief. Ice pack to abdomen and abd binder in place. Problem: Safety - Adult  Goal: Free from fall injury  Outcome: Progressing  Pt is ambulating with aide of walker with RN supervision. Gait is steady and pt tolerates well.

## 2022-11-06 NOTE — PROGRESS NOTES
VSS, -110 on telemetry. Pt reports pain controlled with tylenol and oxycodone. Ice to abd. Abd binder in place with midline noted cuba w/surgical glue. LINDA with serosang output. Pt ambulated  with aide of walker in nieves to room 5322 and back. Tolerated well. Pt now reports passing small amt of flatus. Continues to void. No needs at this time. Call light in reach.   Electronically signed by Leland Song RN on 11/6/22 at 1:46 AM EST

## 2022-11-06 NOTE — DISCHARGE INSTRUCTIONS
Discharge Instructions:    Diet:   You may resume a regular diet. Wound Care:   Skin glue was used to cover your incision(s). It will fall off on its own in about 10 days. You may shower, but do not scrub the incision sites directly or soak (tub, pool, etc.). Activity:   No heavy lifting greater than a milk jug until follow up. Pain management:   Unless informed of any restrictions by your primary care physician, please use your preferred over-the-counter pain reliever as your primary pain medication. If you have pain that persists despite over-the-counter pain medications, you have been provided with a prescription for an opioid/narcotic pain reliever (oxycodone)    No driving or operating machinery while taking opioid/narcotic medications. Bowel Regimen:   Opioid/Narcotic pain relievers have a common side effect of constipation; therefore, you have been provided with a prescription for a stool softener, Docusate (Colace). These medications are intended to help prevent you from experiencing this very common side effect and also help to regulate your bowels after surgery. If your stools become too loose and/or frequent, decrease the Colace to one pill one time each day. If your stools are still loose after this modification, stop taking this medication all together. Return Precautions:   Call/ Return to ED for increased redness, worsening pain, drainage from wound, fevers, or any other concerns about your incision or post op course. Follow up with Dr. Nahum Landa in 2 weeks. Please call [phone] to schedule your appointment.

## 2022-11-06 NOTE — PROGRESS NOTES
Surgery   Daily Progress Note  Patient: Isael ChilelZaneKimberly    CC: Recurrent incisional hernia    SUBJECTIVE:  Pt HDS and afebrile. Pain well controlled. Passing flatus, no bowel movement. OOB ambulated in hallways. No burping or belching, nausea, or vomiting. Did slow down on clears after discussion with Dr. Priyanka Song yesterday. Hungry this morning. ROS: A 14 point review of systems was conducted, significant findings as noted above. All other systems negative. OBJECTIVE:   Infusions:   sodium chloride      lactated ringers 150 mL/hr at 11/06/22 0418      I/O:I/O last 3 completed shifts: In: 1457.3 [P.O.:300; I.V.:1050; IV Piggyback:107.3]  Out: 2482 [Urine:2350; Drains:132]           Wt Readings from Last 1 Encounters:   11/03/22 280 lb 6.4 oz (127.2 kg)       Exam:  /80   Pulse (!) 108   Temp 98.8 °F (37.1 °C) (Oral)   Resp 16   Ht 5' 7.5\" (1.715 m)   Wt 280 lb 6.4 oz (127.2 kg)   LMP 01/03/2022 (Approximate) Comment: Pt states \"Had Period in January 2022\"  SpO2 92%   BMI 43.27 kg/m²     General Appearance: no apparent distress   Neuro: A&Ox3, no focal deficits  Lungs: Normal effort; no adventitious breath sounds  Heart: Regular rate and rhythm  Abdomen: Soft, appropriately tender, midline abd incision with prineo, abd binder in place. LINDA drain with SS output  Extremities: No edema, no cyanosis             LABS:   Recent Labs     11/04/22  0532 11/05/22 0526   WBC 6.1 6.1   HGB 12.0 10.4*   HCT 35.3* 31.2*   MCV 93.0 93.5    136          Recent Labs     11/04/22  0532 11/05/22 0526    135*   K 4.5 4.1    102   CO2 27 26   PHOS 3.1 1.7*   BUN 11 8   CREATININE 0.7 0.6       Recent Labs     11/03/22  1902   TROPONINI 0.01       ASSESSMENT/PLAN:   Pt is a 65year old female s/p open repair of recurrent incisional hernia, bilateral transversus abdominus release retrorectus mesh placement 24x24 cm w/ TAP block.  POD #3    - Patient would like to try more substantial clears this morning. If tolerating and having bowel function, will advance later today.   - SLIV   - PO pain meds   - Encourage OOB ambulation    - Dispo home pending diet and mobility advancement     Franc Navarro  PGY1, General Surgery  11/06/22  6:43 AM    I am post-call today and will not be on campus. Please contact Dr. Ghislaine Rojas at [phone] for questions or concerns regarding this patient. I have seen, examined, and reviewed the patients chart. I agree with the residents assessment and have made appropriate changes.     Cesar Clements

## 2022-11-07 LAB
ALBUMIN SERPL-MCNC: 3 G/DL (ref 3.4–5)
ANION GAP SERPL CALCULATED.3IONS-SCNC: 6 MMOL/L (ref 3–16)
BASOPHILS ABSOLUTE: 0 K/UL (ref 0–0.2)
BASOPHILS RELATIVE PERCENT: 0.7 %
BUN BLDV-MCNC: 6 MG/DL (ref 7–20)
CALCIUM SERPL-MCNC: 8.9 MG/DL (ref 8.3–10.6)
CHLORIDE BLD-SCNC: 104 MMOL/L (ref 99–110)
CO2: 27 MMOL/L (ref 21–32)
CREAT SERPL-MCNC: <0.5 MG/DL (ref 0.6–1.1)
EOSINOPHILS ABSOLUTE: 0.2 K/UL (ref 0–0.6)
EOSINOPHILS RELATIVE PERCENT: 4.4 %
GFR SERPL CREATININE-BSD FRML MDRD: >60 ML/MIN/{1.73_M2}
GLUCOSE BLD-MCNC: 99 MG/DL (ref 70–99)
HCT VFR BLD CALC: 28 % (ref 36–48)
HEMOGLOBIN: 9.7 G/DL (ref 12–16)
LYMPHOCYTES ABSOLUTE: 1.2 K/UL (ref 1–5.1)
LYMPHOCYTES RELATIVE PERCENT: 29 %
MAGNESIUM: 2 MG/DL (ref 1.8–2.4)
MCH RBC QN AUTO: 32.1 PG (ref 26–34)
MCHC RBC AUTO-ENTMCNC: 34.6 G/DL (ref 31–36)
MCV RBC AUTO: 92.9 FL (ref 80–100)
MONOCYTES ABSOLUTE: 0.3 K/UL (ref 0–1.3)
MONOCYTES RELATIVE PERCENT: 7.9 %
NEUTROPHILS ABSOLUTE: 2.4 K/UL (ref 1.7–7.7)
NEUTROPHILS RELATIVE PERCENT: 58 %
PDW BLD-RTO: 13.1 % (ref 12.4–15.4)
PHOSPHORUS: 2.6 MG/DL (ref 2.5–4.9)
PLATELET # BLD: 158 K/UL (ref 135–450)
PMV BLD AUTO: 8.1 FL (ref 5–10.5)
POTASSIUM SERPL-SCNC: 3.8 MMOL/L (ref 3.5–5.1)
RBC # BLD: 3.02 M/UL (ref 4–5.2)
SODIUM BLD-SCNC: 137 MMOL/L (ref 136–145)
WBC # BLD: 4.1 K/UL (ref 4–11)

## 2022-11-07 PROCEDURE — 6360000002 HC RX W HCPCS: Performed by: STUDENT IN AN ORGANIZED HEALTH CARE EDUCATION/TRAINING PROGRAM

## 2022-11-07 PROCEDURE — 1200000000 HC SEMI PRIVATE

## 2022-11-07 PROCEDURE — 83735 ASSAY OF MAGNESIUM: CPT

## 2022-11-07 PROCEDURE — 6370000000 HC RX 637 (ALT 250 FOR IP): Performed by: NURSE PRACTITIONER

## 2022-11-07 PROCEDURE — 80069 RENAL FUNCTION PANEL: CPT

## 2022-11-07 PROCEDURE — 2580000003 HC RX 258: Performed by: STUDENT IN AN ORGANIZED HEALTH CARE EDUCATION/TRAINING PROGRAM

## 2022-11-07 PROCEDURE — 6370000000 HC RX 637 (ALT 250 FOR IP): Performed by: STUDENT IN AN ORGANIZED HEALTH CARE EDUCATION/TRAINING PROGRAM

## 2022-11-07 PROCEDURE — 36415 COLL VENOUS BLD VENIPUNCTURE: CPT

## 2022-11-07 PROCEDURE — 85025 COMPLETE CBC W/AUTO DIFF WBC: CPT

## 2022-11-07 RX ORDER — BISACODYL 10 MG
10 SUPPOSITORY, RECTAL RECTAL ONCE
Status: COMPLETED | OUTPATIENT
Start: 2022-11-07 | End: 2022-11-07

## 2022-11-07 RX ORDER — POLYETHYLENE GLYCOL 3350 17 G/17G
17 POWDER, FOR SOLUTION ORAL 2 TIMES DAILY
Status: DISCONTINUED | OUTPATIENT
Start: 2022-11-07 | End: 2022-11-08 | Stop reason: HOSPADM

## 2022-11-07 RX ORDER — DOCUSATE SODIUM 100 MG/1
100 CAPSULE, LIQUID FILLED ORAL 2 TIMES DAILY
Status: DISCONTINUED | OUTPATIENT
Start: 2022-11-07 | End: 2022-11-08 | Stop reason: HOSPADM

## 2022-11-07 RX ADMIN — DOCUSATE SODIUM 100 MG: 100 CAPSULE, LIQUID FILLED ORAL at 08:25

## 2022-11-07 RX ADMIN — DOCUSATE SODIUM 100 MG: 100 CAPSULE, LIQUID FILLED ORAL at 20:31

## 2022-11-07 RX ADMIN — ACETAMINOPHEN 1000 MG: 500 TABLET ORAL at 20:31

## 2022-11-07 RX ADMIN — ACETAMINOPHEN 1000 MG: 500 TABLET ORAL at 02:25

## 2022-11-07 RX ADMIN — ACETAMINOPHEN 1000 MG: 500 TABLET ORAL at 15:49

## 2022-11-07 RX ADMIN — OXYCODONE 5 MG: 5 TABLET ORAL at 08:24

## 2022-11-07 RX ADMIN — BISACODYL 10 MG: 10 SUPPOSITORY RECTAL at 15:30

## 2022-11-07 RX ADMIN — METOCLOPRAMIDE HYDROCHLORIDE 5 MG: 5 INJECTION INTRAMUSCULAR; INTRAVENOUS at 15:49

## 2022-11-07 RX ADMIN — ENOXAPARIN SODIUM 30 MG: 100 INJECTION SUBCUTANEOUS at 20:30

## 2022-11-07 RX ADMIN — POLYETHYLENE GLYCOL 3350 17 G: 17 POWDER, FOR SOLUTION ORAL at 08:25

## 2022-11-07 RX ADMIN — METOCLOPRAMIDE HYDROCHLORIDE 5 MG: 5 INJECTION INTRAMUSCULAR; INTRAVENOUS at 10:13

## 2022-11-07 RX ADMIN — METOCLOPRAMIDE HYDROCHLORIDE 5 MG: 5 INJECTION INTRAMUSCULAR; INTRAVENOUS at 06:38

## 2022-11-07 RX ADMIN — ACETAMINOPHEN 1000 MG: 500 TABLET ORAL at 08:25

## 2022-11-07 RX ADMIN — POLYETHYLENE GLYCOL 3350 17 G: 17 POWDER, FOR SOLUTION ORAL at 20:33

## 2022-11-07 RX ADMIN — OXYCODONE 5 MG: 5 TABLET ORAL at 02:26

## 2022-11-07 RX ADMIN — ENOXAPARIN SODIUM 30 MG: 100 INJECTION SUBCUTANEOUS at 08:25

## 2022-11-07 RX ADMIN — SODIUM CHLORIDE, PRESERVATIVE FREE 10 ML: 5 INJECTION INTRAVENOUS at 20:33

## 2022-11-07 ASSESSMENT — PAIN DESCRIPTION - ORIENTATION
ORIENTATION: MID

## 2022-11-07 ASSESSMENT — PAIN DESCRIPTION - DESCRIPTORS
DESCRIPTORS: ACHING;CRAMPING
DESCRIPTORS: ACHING
DESCRIPTORS: DISCOMFORT

## 2022-11-07 ASSESSMENT — PAIN DESCRIPTION - FREQUENCY
FREQUENCY: INTERMITTENT
FREQUENCY: INTERMITTENT

## 2022-11-07 ASSESSMENT — PAIN SCALES - GENERAL
PAINLEVEL_OUTOF10: 4
PAINLEVEL_OUTOF10: 0
PAINLEVEL_OUTOF10: 4
PAINLEVEL_OUTOF10: 4

## 2022-11-07 ASSESSMENT — PAIN DESCRIPTION - LOCATION
LOCATION: ABDOMEN

## 2022-11-07 ASSESSMENT — PAIN DESCRIPTION - PAIN TYPE
TYPE: SURGICAL PAIN
TYPE: SURGICAL PAIN

## 2022-11-07 ASSESSMENT — PAIN - FUNCTIONAL ASSESSMENT
PAIN_FUNCTIONAL_ASSESSMENT: ACTIVITIES ARE NOT PREVENTED
PAIN_FUNCTIONAL_ASSESSMENT: ACTIVITIES ARE NOT PREVENTED

## 2022-11-07 NOTE — PROGRESS NOTES
VSS, SR/ST on telemetry. Pt walked in nieves before getting settled bed. Pt continues to use a walker and reports she would like one for home use. Pain controlled with prn oxycodone and tylenol. Pt continues to pass gas, but no bm. Tolerating general diet well. Pt anticipates discharging home Monday.     Electronically signed by Ernestine Madrigal RN on 11/7/22 at 3:07 AM EST

## 2022-11-07 NOTE — CARE COORDINATION
CM following: CM met with pt at bedside today. Pt has requested a rolling walker, raised toilet seat and shower chair for home use. CM explained that insurance will not cover the raised toilet seat or shower chair. Pt will private purchase a shower chair, but requested to provide a raised toilet seat and rolling walker. CM has put in a request with Nasra Mo from [address] [place] [address] [place]  for these devices. No other CM needs identified for discharge.   Electronically signed by IMELDA Saenz on 11/7/2022 at 2:01 PM  [phone]

## 2022-11-07 NOTE — PROGRESS NOTES
Pt A&O x4. VSS. Patient had shower today. Dressing is clean, dry, and intact. Pain being managed with PRN pain medication and tylenol. Patient is resting in chair and has no other needs at this time.     Electronically signed by Nabor March RN on 11/7/2022 at 5:45 PM

## 2022-11-07 NOTE — PROGRESS NOTES
Surgery   Daily Progress Note  Patient: Barby Peña    CC: Recurrent incisional hernia    SUBJECTIVE:  Pt HDS and afebrile overnight. Abd pain controlled with pain meds. OOB, ambulating with walker. Passing flatus. No bm. Voiding. Tolerating gen diet    ROS: A 14 point review of systems was conducted, significant findings as noted above. All other systems negative. OBJECTIVE:   Infusions:   sodium chloride        I/O:I/O last 3 completed shifts: In: 3022.5 [P.O.:480; I.V.:2292.5; IV Piggyback:250]  Out: 460 [Urine:350; Drains:110]           Wt Readings from Last 1 Encounters:   11/03/22 280 lb 6.4 oz (127.2 kg)       Exam:  /78   Pulse 93   Temp 97.7 °F (36.5 °C) (Oral)   Resp 16   Ht 5' 7.5\" (1.715 m)   Wt 280 lb 6.4 oz (127.2 kg)   LMP 01/03/2022 (Approximate) Comment: Pt states \"Had Period in January 2022\"  SpO2 94%   BMI 43.27 kg/m²     General Appearance: no apparent distress   Neuro: A&Ox3, no focal deficits  Lungs: Normal effort; no adventitious breath sounds  Heart: Regular rate and rhythm  Abdomen: Soft, appropriately tender, midline abd incision with prineo, abd binder in place. LINDA drain with minimal SS output  Extremities: No edema, no cyanosis             LABS:   Recent Labs     11/05/22  0526 11/06/22  0628   WBC 6.1 5.1   HGB 10.4* 9.9*   HCT 31.2* 30.1*   MCV 93.5 95.9    135          Recent Labs     11/05/22  0526 11/06/22  0628   * 136   K 4.1 4.1    102   CO2 26 23   PHOS 1.7* 2.3*   BUN 8 7   CREATININE 0.6 0.5*       No results for input(s): CKTOTAL, CKMB, CKMBINDEX, TROPONINI in the last 72 hours. ASSESSMENT/PLAN:   Pt is a 65year old female s/p open repair of recurrent incisional hernia, bilateral transversus abdominus release retrorectus mesh placement 24x24 cm w/ TAP block.  POD #4    - Cont reg diet  - LINDA in place with SS output; will discuss possible LINDA drain removal with staff  - PO pain meds   - Encourage OOB ambulation    - Dispo home today vs tomorrow    Min Olivarez, CNP  11/7/2022  6:30 AM  perfectserve    I have seen, examined, and reviewed the patients chart. I agree with the residents assessment and have made appropriate changes.     Aris Ocampo

## 2022-11-08 VITALS
OXYGEN SATURATION: 96 % | HEART RATE: 91 BPM | DIASTOLIC BLOOD PRESSURE: 85 MMHG | RESPIRATION RATE: 17 BRPM | HEIGHT: 68 IN | BODY MASS INDEX: 42.5 KG/M2 | WEIGHT: 280.4 LBS | SYSTOLIC BLOOD PRESSURE: 141 MMHG | TEMPERATURE: 97.8 F

## 2022-11-08 LAB
ALBUMIN SERPL-MCNC: 3.6 G/DL (ref 3.4–5)
ANION GAP SERPL CALCULATED.3IONS-SCNC: 10 MMOL/L (ref 3–16)
BASOPHILS ABSOLUTE: 0 K/UL (ref 0–0.2)
BASOPHILS RELATIVE PERCENT: 0.6 %
BUN BLDV-MCNC: 6 MG/DL (ref 7–20)
CALCIUM SERPL-MCNC: 9.8 MG/DL (ref 8.3–10.6)
CHLORIDE BLD-SCNC: 101 MMOL/L (ref 99–110)
CO2: 25 MMOL/L (ref 21–32)
CREAT SERPL-MCNC: <0.5 MG/DL (ref 0.6–1.1)
EOSINOPHILS ABSOLUTE: 0.3 K/UL (ref 0–0.6)
EOSINOPHILS RELATIVE PERCENT: 5.8 %
GFR SERPL CREATININE-BSD FRML MDRD: >60 ML/MIN/{1.73_M2}
GLUCOSE BLD-MCNC: 98 MG/DL (ref 70–99)
HCT VFR BLD CALC: 31.5 % (ref 36–48)
HEMOGLOBIN: 10.7 G/DL (ref 12–16)
LYMPHOCYTES ABSOLUTE: 1.1 K/UL (ref 1–5.1)
LYMPHOCYTES RELATIVE PERCENT: 23.5 %
MAGNESIUM: 2 MG/DL (ref 1.8–2.4)
MCH RBC QN AUTO: 31.5 PG (ref 26–34)
MCHC RBC AUTO-ENTMCNC: 34 G/DL (ref 31–36)
MCV RBC AUTO: 92.8 FL (ref 80–100)
MONOCYTES ABSOLUTE: 0.3 K/UL (ref 0–1.3)
MONOCYTES RELATIVE PERCENT: 7.2 %
NEUTROPHILS ABSOLUTE: 3 K/UL (ref 1.7–7.7)
NEUTROPHILS RELATIVE PERCENT: 62.9 %
PDW BLD-RTO: 12.8 % (ref 12.4–15.4)
PHOSPHORUS: 2.7 MG/DL (ref 2.5–4.9)
PLATELET # BLD: 223 K/UL (ref 135–450)
PMV BLD AUTO: 8.3 FL (ref 5–10.5)
POTASSIUM SERPL-SCNC: 4 MMOL/L (ref 3.5–5.1)
RBC # BLD: 3.39 M/UL (ref 4–5.2)
SODIUM BLD-SCNC: 136 MMOL/L (ref 136–145)
WBC # BLD: 4.7 K/UL (ref 4–11)

## 2022-11-08 PROCEDURE — 80069 RENAL FUNCTION PANEL: CPT

## 2022-11-08 PROCEDURE — 6370000000 HC RX 637 (ALT 250 FOR IP): Performed by: STUDENT IN AN ORGANIZED HEALTH CARE EDUCATION/TRAINING PROGRAM

## 2022-11-08 PROCEDURE — 36415 COLL VENOUS BLD VENIPUNCTURE: CPT

## 2022-11-08 PROCEDURE — 83735 ASSAY OF MAGNESIUM: CPT

## 2022-11-08 PROCEDURE — 85025 COMPLETE CBC W/AUTO DIFF WBC: CPT

## 2022-11-08 PROCEDURE — 6360000002 HC RX W HCPCS: Performed by: STUDENT IN AN ORGANIZED HEALTH CARE EDUCATION/TRAINING PROGRAM

## 2022-11-08 RX ORDER — BISACODYL 10 MG
10 SUPPOSITORY, RECTAL RECTAL ONCE
Status: COMPLETED | OUTPATIENT
Start: 2022-11-08 | End: 2022-11-08

## 2022-11-08 RX ADMIN — OXYCODONE 10 MG: 5 TABLET ORAL at 11:08

## 2022-11-08 RX ADMIN — ENOXAPARIN SODIUM 30 MG: 100 INJECTION SUBCUTANEOUS at 09:13

## 2022-11-08 RX ADMIN — POLYETHYLENE GLYCOL 3350 17 G: 17 POWDER, FOR SOLUTION ORAL at 09:14

## 2022-11-08 RX ADMIN — BISACODYL 10 MG: 10 SUPPOSITORY RECTAL at 07:39

## 2022-11-08 RX ADMIN — DOCUSATE SODIUM 100 MG: 100 CAPSULE, LIQUID FILLED ORAL at 09:15

## 2022-11-08 ASSESSMENT — PAIN DESCRIPTION - LOCATION: LOCATION: ABDOMEN

## 2022-11-08 ASSESSMENT — PAIN DESCRIPTION - DESCRIPTORS: DESCRIPTORS: DISCOMFORT

## 2022-11-08 ASSESSMENT — PAIN SCALES - GENERAL: PAINLEVEL_OUTOF10: 7

## 2022-11-08 ASSESSMENT — PAIN DESCRIPTION - ORIENTATION: ORIENTATION: RIGHT

## 2022-11-08 NOTE — PROGRESS NOTES
Pt A&O x4. VSS. Pain being managed with PRN pain medication and tylenol. LINDA drain removed ny NP. Tolerated meals well. Patient is resting in chair and has no other needs at this time. Discharge order received. Patient informed of discharge order. Discharge instructions reviewed with patient. Copy of discharge instructions given to patient. Prescriptions picked up at pharmacy. Patient verbalized understanding, denies needs or questions at this time. IV removed. All patient belongings packed and sent with patient upon discharge.       Electronically signed by Dory Farias RN on 11/8/2022 at 5:14 PM

## 2022-11-08 NOTE — DISCHARGE SUMMARY
Discharge Summary      Patient:    Cande Bowen Date:   11/3/2022  6:06 AM    Discharge Date:   11/08/2022    Admitting Physician:   Bonita Florence MD     Discharge Physician:   same    Admitting Diagnosis:  Recurrent incisional hernia     Discharge Diagnosis:   same     Past Medical History:   Diagnosis Date    Arrhythmia     pt reports intermittent arrhythmias -- Dr. Justin Zapien is cardiologist, doesn't take meds    CAD (coronary artery disease)     Cataplexy     Essential hypertension 09/15/2017    High blood pressure     Migraine     Narcolepsy     takes naps to prevent    Pneumonia     june 2022    Prediabetes     Presence of upper and lower permanent dental bridges     Recurrent incisional hernia     Unspecified sleep apnea     NO CPAP    Varicose veins of lower extremities 10/09/2011        Indication for Admission:   Patient presented to [facility] to undergo an open repair of recurrent incisional hernia with retrorectus mesh placement and bilateral posterior component separation for a recurrent incisional hernia    Hospital Course:   Patient arrived to [facility] under undergo the above stated procedure. The patient's post-operative course was uncomplicated other than difficulty initially with PO intake. On POD 0-4, the patient was having a difficult time tolerating PO intake without having nausea and vomiting. On POD 3, the patient was advanced to a regular diet and was tolerating without any nausea or vomiting. Her pain was well controlled with PO pain medications and she was ambulating with a walker. LINDA drain only put out minimal SS output throughout her time inpatient and was thus removed on POD 5. On POD 5, the patient had a bowel movement and continued to work on ambulating and mobility.  She was ultimately discharged home in stable condition on POD 5    Discharge physical exam:  General Appearance: no apparent distress   Neuro: A&Ox3, no focal deficits  Lungs: Normal effort; no adventitious breath sounds  Heart: Regular rate and rhythm  Abdomen: Soft, appropriately tender, midline abd incision with prineo and some erythema, abd binder in place. LINDA drain site with dressing in place, c/d/i  Extremities: No edema, no cyanosis     Disposition:    Home    Condition at discharge:  Stable    Discharge Instructions:  See separate form    Patient Instructions:      Medication List        START taking these medications      docusate sodium 100 MG capsule  Commonly known as: Colace  Take 1 capsule by mouth 2 times daily as needed for Constipation (Please take while taking narcotic pain medicine. If you develop loose or watery stools, then stop taking.) Please take while taking narcotic pain medicine. If you develop loose or watery stools, then stop taking. oxyCODONE 5 MG immediate release tablet  Commonly known as: Roxicodone  Take 1 tablet by mouth every 6 hours as needed for Pain for up to 7 days. CONTINUE taking these medications      ascorbic acid 500 MG tablet  Commonly known as: VITAMIN C     EXCEDRIN MIGRAINE PO     ibuprofen 800 MG tablet  Commonly known as: ADVIL;MOTRIN  Take 1 tablet by mouth every 8 hours as needed for Pain            STOP taking these medications      diclofenac sodium 1 % Gel  Commonly known as: VOLTAREN     meloxicam 7.5 MG tablet  Commonly known as: MOBIC     sodium chloride 0.65 % nasal spray  Commonly known as:  [facility] Spray     sodium oxybate 500 MG/ML Soln solution  Commonly known as: XYREM     terbinafine 250 MG tablet  Commonly known as: LAMISIL     triamcinolone 0.1 % cream  Commonly known as: KENALOG               Where to Get Your Medications        You can get these medications from any pharmacy    Bring a paper prescription for each of these medications  docusate sodium 100 MG capsule  oxyCODONE 5 MG immediate release tablet         Tony Blancas DO  11/08/22  7:31 AM

## 2022-11-08 NOTE — PROGRESS NOTES
Surgery   Daily Progress Note  Patient: Vasu Peña    CC: Recurrent incisional hernia    SUBJECTIVE:  Tolerating diet, no n/v, having BM, and ambulating with walker. ROS: A 14 point review of systems was conducted, significant findings as noted above. All other systems negative. OBJECTIVE:   Infusions:   sodium chloride        I/O:I/O last 3 completed shifts: In: 300 [P.O.:300]  Out: 35 [Drains:35]           Wt Readings from Last 1 Encounters:   11/03/22 280 lb 6.4 oz (127.2 kg)       Exam:  /68   Pulse 87   Temp 97.9 °F (36.6 °C) (Oral)   Resp 18   Ht 5' 7.5\" (1.715 m)   Wt 280 lb 6.4 oz (127.2 kg)   LMP 01/03/2022 (Approximate) Comment: Pt states \"Had Period in January 2022\"  SpO2 94%   BMI 43.27 kg/m²     General Appearance: no apparent distress   Neuro: A&Ox3, no focal deficits  Lungs: Normal effort; no adventitious breath sounds  Heart: Regular rate and rhythm  Abdomen: Soft, appropriately tender, midline abd incision with prineo and some erythema, abd binder in place. LINDA drain with minimal SS output  Extremities: No edema, no cyanosis             LABS:   Recent Labs     11/06/22  0628 11/07/22  0631   WBC 5.1 4.1   HGB 9.9* 9.7*   HCT 30.1* 28.0*   MCV 95.9 92.9    158          Recent Labs     11/06/22 0628 11/07/22  0631    137   K 4.1 3.8    104   CO2 23 27   PHOS 2.3* 2.6   BUN 7 6*   CREATININE 0.5* <0.5*       No results for input(s): CKTOTAL, CKMB, CKMBINDEX, TROPONINI in the last 72 hours. ASSESSMENT/PLAN:   Pt is a 65year old female s/p open repair of recurrent incisional hernia, bilateral transversus abdominus release retrorectus mesh placement 24x24 cm w/ TAP block.  POD #5    - Cont reg diet  - suppository this am  - LINDA in place with SS output; will remove prior to d/c  - PO pain meds   - Cont OOB and ambulation    - Dispo home today vs tomorrow    Pamela Beckwith DO   PGY1, General Surgery  11/08/22  6:49 AM  Pager # [phone]    I have seen, examined, and reviewed the patients chart. I agree with the residents assessment and have made appropriate changes.     Ann Marieta Fairly MD Statement: I personally performed the services described in the documentation, reviewed the documentation recorded by the scribe in my presence and it accurately and completely records my words and actions

## 2022-11-08 NOTE — CARE COORDINATION
Case Management Assessment            Discharge Note                    Date / Time of Note: 11/8/2022 2:08 PM                  Discharge Note Completed by: IMELDA Hubbard    Patient Name: Karly Warner   YOB: 1965  Diagnosis: Recurrent incisional hernia [K43.2]   Date / Time: 11/3/2022  6:06 AM    Current PCP: Shakila Weeks MD  Clinic patient: No    Hospitalization in the last 30 days: No    Advance Directives:  Code Status: Full Code  [place] DNR form completed and on chart: Not Indicated    Financial:  Payor: Kathy Carcamo 150 / Plan: Doris Murillo PPO / Product Type: *No Product type* /      Pharmacy:    [facility] [phone] -  [phone]  [address] [address]  Phone: [phone] Fax: [phone]      Assistance purchasing medications?:    Assistance provided by Case Management: None at this time    Does patient want to participate in local refill/ meds to beds program?:      Meds To Beds General Rules:  1. Can ONLY be done Monday- Friday between 8:30am-5pm  2. Prescription(s) must be in pharmacy by 3pm to be filled same day  3. Copy of patient's insurance/ prescription drug card and patient face sheet must be sent along with the prescription(s)  4. Cost of Rx cannot be added to hospital bill. If financial assistance is needed, please contact unit  or ;  or  CANNOT provide pharmacy voucher for patients co-pays  5.  Patients can then  the prescription on their way out of the hospital at discharge, or pharmacy can deliver to the bedside if staff is available. (payment due at time of pick-up or delivery - cash, check, or card accepted)     Able to afford home medications/ co-pay costs: Yes    ADLS:  Current PT AM-PAC Score:   /24  Current OT AM-PAC Score:   /24      DISCHARGE Disposition: Home- No Services Needed    LOC at discharge: Not Applicable  JOHN

## 2022-11-08 NOTE — PROGRESS NOTES
Pt A&O x4. [facility] provide and co-operated. Pain was managed with scheduled tylenol effective. LINDA Drain in place and resulted pink fluid. Pt walked in her room and hallway and tolerated well. No BM yet. Bowel Sound x4. Denied nausea and refused Reglan.  /68   Pulse 87   Temp 97.9 °F (36.6 °C) (Oral)   Resp 18   Ht 5' 7.5\" (1.715 m)   Wt 280 lb 6.4 oz (127.2 kg)   LMP 01/03/2022 (Approximate) Comment: Pt states \"Had Period in January 2022\"  SpO2 94%   BMI 43.27 kg/m²

## 2022-11-09 ENCOUNTER — CARE COORDINATION (OUTPATIENT)
Dept: CASE MANAGEMENT | Age: 57
End: 2022-11-09

## 2022-11-09 DIAGNOSIS — K43.2 RECURRENT INCISIONAL HERNIA: Primary | ICD-10-CM

## 2022-11-09 NOTE — CARE COORDINATION
[facility] Care Transitions Initial Follow Up Call    Call within 2 business days of discharge: Yes    Care Transition Nurse contacted the patient by telephone to perform post hospital discharge assessment. Verified name and  with patient as identifiers. Provided introduction to self, and explanation of the Care Transition Nurse role. Patient: Piper Floyd Patient : 1965   MRN: [id number]    Reason for Admission: Recurrent Incisional Hernia  Discharge Date: 22  RARS: Readmission Risk Score: 5.3      Last Discharge  [address]       Date Complaint Diagnosis Description Type Department Provider    11/3/22  Incisional hernia, without obstruction or gangrene . .. Admission (Discharged) Natty Barrera MD            Was this an external facility discharge? No     Challenges to be reviewed by the provider   Additional needs identified to be addressed with provider: No  none               Method of communication with provider: none. CTN spoke with patient this afternoon for initial 24 hour discharge follow up CTN call. Patient states she is doing okay, having some pain. Patient rates pain a 4/10 on pain scale, taking PRN's and will add Tylenol EX. Patient denies having any fever, chills, nausea, vomiting, chest pain, SOB or cough. Patient denies having any difficulty with urination, has had BM and is beginning to eat more. Patient states incisions are healing well, no drainage, increased pain, redness or swelling noted this morning. Patient does report having some BLE Edema. CTN and Pt reviewed meds and CTN completed the 1111f. Patient has follow up with Surgeon scheduled, will contact PCP to see if hospital F/U is needed. Care Transition Nurse reviewed discharge instructions, medical action plan, and red flags with patient who verbalized understanding.  The patient was given an opportunity to ask questions and does not have any further questions or concerns at this time. Were discharge instructions available to patient? Yes. Reviewed appropriate site of care based on symptoms and resources available to patient including: PCP  Specialist  Urgent care clinics  [facility] 24/7  When to call 911. The patient agrees to contact the PCP office for questions related to their healthcare. Advance Care Planning:   Does patient have an Advance Directive: reviewed and current. Medication reconciliation was performed with patient, who verbalizes understanding of administration of home medications. Medications reviewed, 1111F entered: yes    Was patient discharged with a pulse oximeter? no    Non-face-to-face services provided:  Obtained and reviewed discharge summary and/or continuity of care documents  Education of patient/family/caregiver/guardian to support self-management-Patient instructed to continue to monitor for any of the above noted s/s, reporting to MD immediately. Patient instructed to not exceed 3000 mg of Tylenol in a 24 hour period. Continue to monitor incisions for signs of infection. Assessment and support for treatment adherence and medication management-Medication Review Completed with patient.     Offered patient enrollment in the Remote Patient Monitoring (RPM) program for in-home monitoring: Patient is not eligible for RPM program.    Care Transitions 24 Hour Call    Schedule Follow Up Appointment with PCP: Declined  Do you have a copy of your discharge instructions?: Yes  Do you have all of your prescriptions and are they filled?: Yes  Have you been contacted by a [facility] Pharmacist?: No  Have you scheduled your follow up appointment?: Yes  How are you going to get to your appointment?: Car - family or friend to transport  Do you have support at home?: Partner/Spouse/SO  Do you feel like you have everything you need to keep you well at home?: Yes  Are you an active caregiver in your home?: No  Care Transitions Interventions  No Identified Needs         Follow

## 2022-11-10 NOTE — PROGRESS NOTES
Physician Progress Note      PATIENT:               Leanna Erickson  CSN #:                  [id number]  :                       1965  ADMIT DATE:       11/3/2022 6:06 AM  DISCH DATE:        2022 4:55 PM  RESPONDING  PROVIDER #:        Isaak Soto DO          QUERY TEXT:    Pt admitted 11/3-  with Large midline incisional hernia, s/p mesh repair   11/3. Pt noted to have HGB: 14- 9.7. If possible, please document in the   discharge summary if you are evaluating and/or treating any of the following: The medical record reflects the following:  Risk Factors: Hernia repair, IVFs @ 150/hr 11/3-   Clinical Indicators: HGB trend since admission: - 9.7- 10.7. EBL: 100 cc  Treatment: Daily CBC monitoring  Options provided:  -- Postoperative acute blood loss anemia  -- Precipitous drop in Hemoglobin and Hematocrit  -- Dilutional anemia  -- Other - I will add my own diagnosis  -- Disagree - Not applicable / Not valid  -- Disagree - Clinically unable to determine / Unknown  -- Refer to Clinical Documentation Reviewer    PROVIDER RESPONSE TEXT:    This patient has postoperative acute blood loss anemia. Query created by:  Anton Pepe on 2022 9:32 AM      Electronically signed by:  Isaak Soto DO 11/10/2022 8:31 AM

## 2022-11-14 ENCOUNTER — APPOINTMENT (OUTPATIENT)
Dept: CT IMAGING | Age: 57
DRG: 920 | End: 2022-11-14
Payer: COMMERCIAL

## 2022-11-14 ENCOUNTER — HOSPITAL ENCOUNTER (INPATIENT)
Age: 57
LOS: 3 days | Discharge: HOME | DRG: 920 | End: 2022-11-17
Attending: EMERGENCY MEDICINE | Admitting: SURGERY
Payer: COMMERCIAL

## 2022-11-14 DIAGNOSIS — T81.49XA SURGICAL WOUND INFECTION: Primary | ICD-10-CM

## 2022-11-14 PROBLEM — L03.90 CELLULITIS: Status: ACTIVE | Noted: 2022-11-14

## 2022-11-14 LAB
ANION GAP SERPL CALCULATED.3IONS-SCNC: 10 MMOL/L (ref 3–16)
BASOPHILS ABSOLUTE: 0 K/UL (ref 0–0.2)
BASOPHILS RELATIVE PERCENT: 0.5 %
BUN BLDV-MCNC: 15 MG/DL (ref 7–20)
CALCIUM SERPL-MCNC: 9.6 MG/DL (ref 8.3–10.6)
CHLORIDE BLD-SCNC: 106 MMOL/L (ref 99–110)
CO2: 22 MMOL/L (ref 21–32)
CREAT SERPL-MCNC: 0.6 MG/DL (ref 0.6–1.1)
EOSINOPHILS ABSOLUTE: 0.5 K/UL (ref 0–0.6)
EOSINOPHILS RELATIVE PERCENT: 7.8 %
GFR SERPL CREATININE-BSD FRML MDRD: >60 ML/MIN/{1.73_M2}
GLUCOSE BLD-MCNC: 108 MG/DL (ref 70–99)
HCT VFR BLD CALC: 37.4 % (ref 36–48)
HEMOGLOBIN: 12.1 G/DL (ref 12–16)
LYMPHOCYTES ABSOLUTE: 1.5 K/UL (ref 1–5.1)
LYMPHOCYTES RELATIVE PERCENT: 22.1 %
MCH RBC QN AUTO: 31.2 PG (ref 26–34)
MCHC RBC AUTO-ENTMCNC: 32.4 G/DL (ref 31–36)
MCV RBC AUTO: 96.2 FL (ref 80–100)
MONOCYTES ABSOLUTE: 0.5 K/UL (ref 0–1.3)
MONOCYTES RELATIVE PERCENT: 7.7 %
NEUTROPHILS ABSOLUTE: 4.2 K/UL (ref 1.7–7.7)
NEUTROPHILS RELATIVE PERCENT: 61.9 %
PDW BLD-RTO: 13.9 % (ref 12.4–15.4)
PLATELET # BLD: 244 K/UL (ref 135–450)
PMV BLD AUTO: 7.9 FL (ref 5–10.5)
POTASSIUM REFLEX MAGNESIUM: 5.6 MMOL/L (ref 3.5–5.1)
RBC # BLD: 3.88 M/UL (ref 4–5.2)
SODIUM BLD-SCNC: 138 MMOL/L (ref 136–145)
WBC # BLD: 6.7 K/UL (ref 4–11)

## 2022-11-14 PROCEDURE — 99285 EMERGENCY DEPT VISIT HI MDM: CPT

## 2022-11-14 PROCEDURE — 1200000000 HC SEMI PRIVATE

## 2022-11-14 PROCEDURE — 74176 CT ABD & PELVIS W/O CONTRAST: CPT

## 2022-11-14 PROCEDURE — 2580000003 HC RX 258

## 2022-11-14 PROCEDURE — 6360000002 HC RX W HCPCS: Performed by: EMERGENCY MEDICINE

## 2022-11-14 PROCEDURE — 6360000002 HC RX W HCPCS

## 2022-11-14 PROCEDURE — 80048 BASIC METABOLIC PNL TOTAL CA: CPT

## 2022-11-14 PROCEDURE — 85025 COMPLETE CBC W/AUTO DIFF WBC: CPT

## 2022-11-14 PROCEDURE — 96375 TX/PRO/DX INJ NEW DRUG ADDON: CPT

## 2022-11-14 PROCEDURE — 96365 THER/PROPH/DIAG IV INF INIT: CPT

## 2022-11-14 RX ORDER — DIPHENHYDRAMINE HYDROCHLORIDE 50 MG/ML
25 INJECTION INTRAMUSCULAR; INTRAVENOUS ONCE
Status: COMPLETED | OUTPATIENT
Start: 2022-11-14 | End: 2022-11-14

## 2022-11-14 RX ADMIN — VANCOMYCIN HYDROCHLORIDE 2000 MG: 10 INJECTION, POWDER, LYOPHILIZED, FOR SOLUTION INTRAVENOUS at 21:43

## 2022-11-14 RX ADMIN — CEFEPIME HYDROCHLORIDE 2000 MG: 2 INJECTION, POWDER, FOR SOLUTION INTRAMUSCULAR; INTRAVENOUS at 20:39

## 2022-11-14 RX ADMIN — DIPHENHYDRAMINE HYDROCHLORIDE 25 MG: 50 INJECTION, SOLUTION INTRAMUSCULAR; INTRAVENOUS at 19:32

## 2022-11-14 ASSESSMENT — PAIN DESCRIPTION - ONSET: ONSET: ON-GOING

## 2022-11-14 ASSESSMENT — PAIN DESCRIPTION - LOCATION: LOCATION: ABDOMEN

## 2022-11-14 ASSESSMENT — PAIN DESCRIPTION - DESCRIPTORS: DESCRIPTORS: DISCOMFORT

## 2022-11-14 ASSESSMENT — PAIN SCALES - GENERAL: PAINLEVEL_OUTOF10: 5

## 2022-11-14 ASSESSMENT — PAIN DESCRIPTION - PAIN TYPE: TYPE: SURGICAL PAIN

## 2022-11-14 ASSESSMENT — PAIN DESCRIPTION - FREQUENCY: FREQUENCY: INTERMITTENT

## 2022-11-14 NOTE — ED PROVIDER NOTES
[address] [facility] [address] ENCOUNTER          ATTENDING PHYSICIAN NOTE       Date of evaluation: 11/14/2022    Chief Complaint     Rash (Recent hernia repair w/ Dr. Nikki Wick. Pt states breaking out in a rash around incision/inner thighs a few days ago and has progressively been getting worse. )      History of Present Illness     Ginny Haile is a 65 y.o. female who presents with rash under incisional site, as well as in her groin. She is status post incisional hernia repair with surgery approximately 11 days ago. Patient reports that while she was in the hospital she noticed some itching in the area, when she got home she noticed some areas of redness in her vaginal area and in her groin, but she noticed that redness seem to spread out from her incision site over the last several days as well. It seems of gotten worse and involve the abdomen, as well as on the bilateral proximal thighs and folds. She denies significant abdominal pain otherwise. Denies dysuria says that she is not urinating as much as she would have thought that she would otherwise. Denies any fevers or chills. Says that the red areas are itchy primarily. Review of Systems     Review of Systems  Pertinent positives and negatives are listed in the HPI; otherwise all systems are reviewed and were negative. Past Medical, Surgical, Family, and Social History     She has a past medical history of Arrhythmia, CAD (coronary artery disease), Cataplexy, Essential hypertension, High blood pressure, Migraine, Narcolepsy, Pneumonia, Prediabetes, Presence of upper and lower permanent dental bridges, Recurrent incisional hernia, Unspecified sleep apnea, and Varicose veins of lower extremities. She has a past surgical history that includes Endometrial ablation (06/2009); Tubal ligation; Colonoscopy; ventral hernia repair (N/A, 12/3/2019); and ventral hernia repair (N/A, 11/3/2022).   Her family history includes Arthritis in her father; Diabetes in her mother; High Blood Pressure in her mother; Mental Illness in her father. She reports that she has never smoked. She has never used smokeless tobacco. She reports current drug use. Drug: Opiates . She reports that she does not drink alcohol. Medications     Previous Medications    ASCORBIC ACID (VITAMIN C) 500 MG TABLET    Take 500 mg by mouth daily. ASPIRIN-ACETAMINOPHEN-CAFFEINE (EXCEDRIN MIGRAINE PO)    Take 1 tablet by mouth as needed    DOCUSATE SODIUM (COLACE) 100 MG CAPSULE    Take 1 capsule by mouth 2 times daily as needed for Constipation (Please take while taking narcotic pain medicine. If you develop loose or watery stools, then stop taking.) Please take while taking narcotic pain medicine. If you develop loose or watery stools, then stop taking. IBUPROFEN (ADVIL;MOTRIN) 800 MG TABLET    Take 1 tablet by mouth every 8 hours as needed for Pain       Allergies     She has No Known Allergies. Physical Exam     INITIAL VITALS: BP: 116/83, Temp: 98.1 °F (36.7 °C), Heart Rate: 85, Resp: 16, SpO2: 100 %   Physical Exam  Constitutional:       General: She is not in acute distress. Appearance: Normal appearance. HENT:      Head: Normocephalic and atraumatic. Cardiovascular:      Rate and Rhythm: Normal rate. Pulmonary:      Effort: Pulmonary effort is normal. No respiratory distress. Abdominal:      Palpations: Abdomen is soft. Tenderness: There is abdominal tenderness (Minimally/appropriately tender). Musculoskeletal:      Cervical back: Normal range of motion. Skin:     Comments: Diffuse erythema extending from the midline surgical incision out towards the lateral area abdomen is far up is underneath the left breast.  No vesicles appreciated in this area, no bullae or induration.   Midline incision appears closed, with Steri-Strips    There is also a more bright red erythema with some changes of dermatitis on the bilateral medial thighs extending up into the groin itself. Neurological:      General: No focal deficit present. Mental Status: She is alert and oriented to person, place, and time. Diagnostic Results     EKG       RADIOLOGY:  CT ABDOMEN PELVIS WO CONTRAST Additional Contrast? None   Final Result      1. Status post anterior abdominal wall hernia repair with a hyperdense fluid collection mainly to the left of midline but also small area extending to the right of midline most like represent multifocal hyperdense fluid collections, most likely    hematoma although could be infected. This could be drained percutaneously. The collection measures up to 10.5 cm in length and at its widest 14.6 cm and depth of 9.6 cm      2. No recurrent herniation of bowel loops or mesentery with subcutaneous stranding along the right left flank and anterior abdominal wall   3. Normal-appearing appendix and lobular appearing uterus. No free air in the abdomen.  Gallbladder contracted             LABS:   Results for orders placed or performed during the hospital encounter of 11/14/22   CBC with Auto Differential   Result Value Ref Range    WBC 6.7 4.0 - 11.0 K/uL    RBC 3.88 (L) 4.00 - 5.20 M/uL    Hemoglobin 12.1 12.0 - 16.0 g/dL    Hematocrit 37.4 36.0 - 48.0 %    MCV 96.2 80.0 - 100.0 fL    MCH 31.2 26.0 - 34.0 pg    MCHC 32.4 31.0 - 36.0 g/dL    RDW 13.9 12.4 - 15.4 %    Platelets 779 835 - 043 K/uL    MPV 7.9 5.0 - 10.5 fL    Neutrophils % 61.9 %    Lymphocytes % 22.1 %    Monocytes % 7.7 %    Eosinophils % 7.8 %    Basophils % 0.5 %    Neutrophils Absolute 4.2 1.7 - 7.7 K/uL    Lymphocytes Absolute 1.5 1.0 - 5.1 K/uL    Monocytes Absolute 0.5 0.0 - 1.3 K/uL    Eosinophils Absolute 0.5 0.0 - 0.6 K/uL    Basophils Absolute 0.0 0.0 - 0.2 K/uL   BMP w/ Reflex to MG   Result Value Ref Range    Sodium 138 136 - 145 mmol/L    Potassium reflex Magnesium 5.6 (H) 3.5 - 5.1 mmol/L    Chloride 106 99 - 110 mmol/L    CO2 22 21 - 32 mmol/L    Anion Gap 10 3 - 16 Glucose 108 (H) 70 - 99 mg/dL    BUN 15 7 - 20 mg/dL    Creatinine 0.6 0.6 - 1.1 mg/dL    Est, Glom Filt Rate >60 >60    Calcium 9.6 8.3 - 10.6 mg/dL       ED BEDSIDE ULTRASOUND:  No results found. RECENT VITALS:  BP: (!) 124/111,Temp: 98.1 °F (36.7 °C), Heart Rate: 82, Resp: 16, SpO2: 99 %     Procedures         ED Course     Nursing Notes, Past Medical Hx, Past Surgical Hx, Social Hx,Allergies, and Family Hx were reviewed.          patient was given the following medications:  Orders Placed This Encounter   Medications    diphenhydrAMINE (BENADRYL) injection 25 mg    DISCONTD: cefepime (MAXIPIME) 2000 mg IVPB minibag     Order Specific Question:   Antimicrobial Indications     Answer:   Skin and Soft Tissue Infection     Order Specific Question:   Skin duration of therapy     Answer:   5 days    FOLLOWED BY Linked Order Group     cefepime (MAXIPIME) 2000 mg IVPB minibag      Order Specific Question:   Antimicrobial Indications      Answer:   Skin and Soft Tissue Infection      Order Specific Question:   Skin duration of therapy      Answer:   5 days     cefepime (MAXIPIME) 2000 mg IVPB minibag      Order Specific Question:   Antimicrobial Indications      Answer:   Skin and Soft Tissue Infection      Order Specific Question:   Skin duration of therapy      Answer:   5 days    FOLLOWED BY Linked Order Group     vancomycin (VANCOCIN) 2,000 mg in dextrose 5 % 500 mL IVPB      Order Specific Question:   Antimicrobial Indications      Answer:   Skin and Soft Tissue Infection      Order Specific Question:   Skin duration of therapy      Answer:   5 days     vancomycin (VANCOCIN) 1,250 mg in dextrose 5 % 250 mL IVPB      Order Specific Question:   Antimicrobial Indications      Answer:   Skin and Soft Tissue Infection      Order Specific Question:   Skin duration of therapy      Answer:   5 days       CONSULTS:  IP CONSULT TO GENERAL SURGERY  PHARMACY TO DOSE VANCOMYCIN    MEDICAL DECISIONMAKING / Osmar Harding / Adilson Lujan James Fenton is a 65 y.o. female Who presents with abdominal pain/itching and redness. She is hemodynamically stable afebrile here. Patient does have some diffuse erythema concerning for cellulitis along the intra-abdominal wound or does appear intact currently. Also some erythema in the intertriginous regions which may be more of a contact dermatitis, but the abdominal erythema is concerning for cellulitis. Surgery was consulted given her recent procedure, requested CT scan which shows a fluid collection potentially hematoma versus infected hematoma. They will admit for IV antibiotics. Clinical Impression     1. Surgical wound infection        Disposition     PATIENT REFERRED TO:  No follow-up provider specified.     DISCHARGE MEDICATIONS:  New Prescriptions    No medications on file       DISPOSITION Admitted 11/14/2022 10:47:56 PM         Iwona Kwong MD  11/14/22 [id number]

## 2022-11-15 ENCOUNTER — APPOINTMENT (OUTPATIENT)
Dept: INTERVENTIONAL RADIOLOGY/VASCULAR | Age: 57
DRG: 920 | End: 2022-11-15
Payer: COMMERCIAL

## 2022-11-15 LAB
ABO/RH: NORMAL
ALBUMIN SERPL-MCNC: 3 G/DL (ref 3.4–5)
ANION GAP SERPL CALCULATED.3IONS-SCNC: 9 MMOL/L (ref 3–16)
ANTIBODY SCREEN: NORMAL
BASOPHILS ABSOLUTE: 0 K/UL (ref 0–0.2)
BASOPHILS RELATIVE PERCENT: 0.8 %
BUN BLDV-MCNC: 13 MG/DL (ref 7–20)
CALCIUM SERPL-MCNC: 9.4 MG/DL (ref 8.3–10.6)
CHLORIDE BLD-SCNC: 105 MMOL/L (ref 99–110)
CO2: 21 MMOL/L (ref 21–32)
CREAT SERPL-MCNC: 0.6 MG/DL (ref 0.6–1.1)
EOSINOPHILS ABSOLUTE: 0.4 K/UL (ref 0–0.6)
EOSINOPHILS RELATIVE PERCENT: 7.5 %
GFR SERPL CREATININE-BSD FRML MDRD: >60 ML/MIN/{1.73_M2}
GLUCOSE BLD-MCNC: 102 MG/DL (ref 70–99)
HCG QUALITATIVE: NEGATIVE
HCT VFR BLD CALC: 31.3 % (ref 36–48)
HEMOGLOBIN: 10.7 G/DL (ref 12–16)
INR BLD: 1.38 (ref 0.87–1.14)
LYMPHOCYTES ABSOLUTE: 1.5 K/UL (ref 1–5.1)
LYMPHOCYTES RELATIVE PERCENT: 26 %
MAGNESIUM: 1.9 MG/DL (ref 1.8–2.4)
MCH RBC QN AUTO: 31.2 PG (ref 26–34)
MCHC RBC AUTO-ENTMCNC: 34 G/DL (ref 31–36)
MCV RBC AUTO: 91.7 FL (ref 80–100)
MONOCYTES ABSOLUTE: 0.4 K/UL (ref 0–1.3)
MONOCYTES RELATIVE PERCENT: 7.3 %
NEUTROPHILS ABSOLUTE: 3.5 K/UL (ref 1.7–7.7)
NEUTROPHILS RELATIVE PERCENT: 58.4 %
PDW BLD-RTO: 13.4 % (ref 12.4–15.4)
PHOSPHORUS: 3.1 MG/DL (ref 2.5–4.9)
PLATELET # BLD: 293 K/UL (ref 135–450)
PMV BLD AUTO: 7.7 FL (ref 5–10.5)
POTASSIUM SERPL-SCNC: 4 MMOL/L (ref 3.5–5.1)
POTASSIUM SERPL-SCNC: 4.8 MMOL/L (ref 3.5–5.1)
PROTHROMBIN TIME: 16.9 SEC (ref 11.7–14.5)
RBC # BLD: 3.41 M/UL (ref 4–5.2)
SODIUM BLD-SCNC: 135 MMOL/L (ref 136–145)
WBC # BLD: 5.9 K/UL (ref 4–11)

## 2022-11-15 PROCEDURE — 2500000003 HC RX 250 WO HCPCS

## 2022-11-15 PROCEDURE — 6370000000 HC RX 637 (ALT 250 FOR IP)

## 2022-11-15 PROCEDURE — C1751 CATH, INF, PER/CENT/MIDLINE: HCPCS

## 2022-11-15 PROCEDURE — 86901 BLOOD TYPING SEROLOGIC RH(D): CPT

## 2022-11-15 PROCEDURE — 0W9F30Z DRAINAGE OF ABDOMINAL WALL WITH DRAINAGE DEVICE, PERCUTANEOUS APPROACH: ICD-10-PCS | Performed by: RADIOLOGY

## 2022-11-15 PROCEDURE — 99152 MOD SED SAME PHYS/QHP 5/>YRS: CPT

## 2022-11-15 PROCEDURE — C1729 CATH, DRAINAGE: HCPCS

## 2022-11-15 PROCEDURE — 10030 IMG GID FLU COLL DRG SFT TIS: CPT

## 2022-11-15 PROCEDURE — 87186 SC STD MICRODIL/AGAR DIL: CPT

## 2022-11-15 PROCEDURE — 87077 CULTURE AEROBIC IDENTIFY: CPT

## 2022-11-15 PROCEDURE — 2580000003 HC RX 258: Performed by: STUDENT IN AN ORGANIZED HEALTH CARE EDUCATION/TRAINING PROGRAM

## 2022-11-15 PROCEDURE — 2500000003 HC RX 250 WO HCPCS: Performed by: STUDENT IN AN ORGANIZED HEALTH CARE EDUCATION/TRAINING PROGRAM

## 2022-11-15 PROCEDURE — 86900 BLOOD TYPING SEROLOGIC ABO: CPT

## 2022-11-15 PROCEDURE — C1769 GUIDE WIRE: HCPCS

## 2022-11-15 PROCEDURE — 86850 RBC ANTIBODY SCREEN: CPT

## 2022-11-15 PROCEDURE — 02HV33Z INSERTION OF INFUSION DEVICE INTO SUPERIOR VENA CAVA, PERCUTANEOUS APPROACH: ICD-10-PCS | Performed by: SURGERY

## 2022-11-15 PROCEDURE — 2709999900 HC NON-CHARGEABLE SUPPLY

## 2022-11-15 PROCEDURE — 80069 RENAL FUNCTION PANEL: CPT

## 2022-11-15 PROCEDURE — 36569 INSJ PICC 5 YR+ W/O IMAGING: CPT

## 2022-11-15 PROCEDURE — 6360000004 HC RX CONTRAST MEDICATION: Performed by: RADIOLOGY

## 2022-11-15 PROCEDURE — 85610 PROTHROMBIN TIME: CPT

## 2022-11-15 PROCEDURE — 2580000003 HC RX 258

## 2022-11-15 PROCEDURE — 83735 ASSAY OF MAGNESIUM: CPT

## 2022-11-15 PROCEDURE — 6360000002 HC RX W HCPCS

## 2022-11-15 PROCEDURE — 87070 CULTURE OTHR SPECIMN AEROBIC: CPT

## 2022-11-15 PROCEDURE — 85025 COMPLETE CBC W/AUTO DIFF WBC: CPT

## 2022-11-15 PROCEDURE — 84132 ASSAY OF SERUM POTASSIUM: CPT

## 2022-11-15 PROCEDURE — 87205 SMEAR GRAM STAIN: CPT

## 2022-11-15 PROCEDURE — 1200000000 HC SEMI PRIVATE

## 2022-11-15 PROCEDURE — 36415 COLL VENOUS BLD VENIPUNCTURE: CPT

## 2022-11-15 PROCEDURE — 84703 CHORIONIC GONADOTROPIN ASSAY: CPT

## 2022-11-15 RX ORDER — SODIUM CHLORIDE, SODIUM LACTATE, POTASSIUM CHLORIDE, CALCIUM CHLORIDE 600; 310; 30; 20 MG/100ML; MG/100ML; MG/100ML; MG/100ML
INJECTION, SOLUTION INTRAVENOUS CONTINUOUS
Status: DISCONTINUED | OUTPATIENT
Start: 2022-11-15 | End: 2022-11-16

## 2022-11-15 RX ORDER — LIDOCAINE HYDROCHLORIDE 10 MG/ML
5 INJECTION, SOLUTION EPIDURAL; INFILTRATION; INTRACAUDAL; PERINEURAL ONCE
Status: COMPLETED | OUTPATIENT
Start: 2022-11-15 | End: 2022-11-15

## 2022-11-15 RX ORDER — SODIUM CHLORIDE 9 MG/ML
INJECTION, SOLUTION INTRAVENOUS PRN
Status: DISCONTINUED | OUTPATIENT
Start: 2022-11-15 | End: 2022-11-17 | Stop reason: HOSPADM

## 2022-11-15 RX ORDER — METRONIDAZOLE 500 MG/100ML
500 INJECTION, SOLUTION INTRAVENOUS EVERY 8 HOURS
Status: DISCONTINUED | OUTPATIENT
Start: 2022-11-15 | End: 2022-11-17 | Stop reason: HOSPADM

## 2022-11-15 RX ORDER — DIPHENHYDRAMINE HCL 25 MG
25 TABLET ORAL EVERY 6 HOURS PRN
Status: DISCONTINUED | OUTPATIENT
Start: 2022-11-15 | End: 2022-11-17 | Stop reason: HOSPADM

## 2022-11-15 RX ORDER — SODIUM CHLORIDE 0.9 % (FLUSH) 0.9 %
5-40 SYRINGE (ML) INJECTION PRN
Status: DISCONTINUED | OUTPATIENT
Start: 2022-11-15 | End: 2022-11-17 | Stop reason: HOSPADM

## 2022-11-15 RX ORDER — ACETAMINOPHEN 160 MG/5ML
650 SOLUTION ORAL EVERY 4 HOURS PRN
Status: DISCONTINUED | OUTPATIENT
Start: 2022-11-15 | End: 2022-11-15

## 2022-11-15 RX ORDER — ACETAMINOPHEN 325 MG/1
650 TABLET ORAL EVERY 4 HOURS PRN
Status: DISCONTINUED | OUTPATIENT
Start: 2022-11-15 | End: 2022-11-17 | Stop reason: HOSPADM

## 2022-11-15 RX ORDER — SODIUM CHLORIDE 9 MG/ML
25 INJECTION, SOLUTION INTRAVENOUS PRN
Status: DISCONTINUED | OUTPATIENT
Start: 2022-11-15 | End: 2022-11-17 | Stop reason: HOSPADM

## 2022-11-15 RX ORDER — ENOXAPARIN SODIUM 100 MG/ML
30 INJECTION SUBCUTANEOUS 2 TIMES DAILY
Status: DISCONTINUED | OUTPATIENT
Start: 2022-11-15 | End: 2022-11-17 | Stop reason: HOSPADM

## 2022-11-15 RX ORDER — SODIUM CHLORIDE 0.9 % (FLUSH) 0.9 %
5-40 SYRINGE (ML) INJECTION EVERY 12 HOURS SCHEDULED
Status: DISCONTINUED | OUTPATIENT
Start: 2022-11-15 | End: 2022-11-17 | Stop reason: HOSPADM

## 2022-11-15 RX ORDER — ONDANSETRON 4 MG/1
4 TABLET, ORALLY DISINTEGRATING ORAL EVERY 8 HOURS PRN
Status: DISCONTINUED | OUTPATIENT
Start: 2022-11-15 | End: 2022-11-17 | Stop reason: HOSPADM

## 2022-11-15 RX ORDER — OXYCODONE HYDROCHLORIDE 5 MG/1
5 TABLET ORAL EVERY 4 HOURS PRN
Status: DISCONTINUED | OUTPATIENT
Start: 2022-11-15 | End: 2022-11-17 | Stop reason: HOSPADM

## 2022-11-15 RX ORDER — ONDANSETRON 2 MG/ML
4 INJECTION INTRAMUSCULAR; INTRAVENOUS EVERY 6 HOURS PRN
Status: DISCONTINUED | OUTPATIENT
Start: 2022-11-15 | End: 2022-11-17 | Stop reason: HOSPADM

## 2022-11-15 RX ORDER — OXYCODONE HYDROCHLORIDE 5 MG/1
10 TABLET ORAL EVERY 4 HOURS PRN
Status: DISCONTINUED | OUTPATIENT
Start: 2022-11-15 | End: 2022-11-17 | Stop reason: HOSPADM

## 2022-11-15 RX ORDER — MAGNESIUM SULFATE 1 G/100ML
1000 INJECTION INTRAVENOUS ONCE
Status: COMPLETED | OUTPATIENT
Start: 2022-11-15 | End: 2022-11-15

## 2022-11-15 RX ADMIN — SODIUM CHLORIDE, PRESERVATIVE FREE 10 ML: 5 INJECTION INTRAVENOUS at 20:47

## 2022-11-15 RX ADMIN — CEFEPIME HYDROCHLORIDE 2000 MG: 2 INJECTION, POWDER, FOR SOLUTION INTRAMUSCULAR; INTRAVENOUS at 04:49

## 2022-11-15 RX ADMIN — ACETAMINOPHEN 650 MG: 325 TABLET ORAL at 23:37

## 2022-11-15 RX ADMIN — SODIUM CHLORIDE 25 ML: 9 INJECTION, SOLUTION INTRAVENOUS at 04:48

## 2022-11-15 RX ADMIN — METRONIDAZOLE 500 MG: 500 INJECTION, SOLUTION INTRAVENOUS at 10:06

## 2022-11-15 RX ADMIN — SODIUM CHLORIDE, POTASSIUM CHLORIDE, SODIUM LACTATE AND CALCIUM CHLORIDE: 600; 310; 30; 20 INJECTION, SOLUTION INTRAVENOUS at 20:23

## 2022-11-15 RX ADMIN — SODIUM CHLORIDE 25 ML: 9 INJECTION, SOLUTION INTRAVENOUS at 20:24

## 2022-11-15 RX ADMIN — ENOXAPARIN SODIUM 30 MG: 100 INJECTION SUBCUTANEOUS at 20:57

## 2022-11-15 RX ADMIN — MAGNESIUM SULFATE HEPTAHYDRATE 1000 MG: 1 INJECTION, SOLUTION INTRAVENOUS at 11:39

## 2022-11-15 RX ADMIN — SODIUM CHLORIDE 25 ML: 9 INJECTION, SOLUTION INTRAVENOUS at 02:06

## 2022-11-15 RX ADMIN — CEFEPIME HYDROCHLORIDE 2000 MG: 2 INJECTION, POWDER, FOR SOLUTION INTRAMUSCULAR; INTRAVENOUS at 20:26

## 2022-11-15 RX ADMIN — ACETAMINOPHEN 650 MG: 650 SOLUTION ORAL at 17:32

## 2022-11-15 RX ADMIN — IOHEXOL 100 ML: 350 INJECTION, SOLUTION INTRAVENOUS at 14:19

## 2022-11-15 RX ADMIN — METRONIDAZOLE 500 MG: 500 INJECTION, SOLUTION INTRAVENOUS at 17:08

## 2022-11-15 RX ADMIN — SODIUM CHLORIDE, POTASSIUM CHLORIDE, SODIUM LACTATE AND CALCIUM CHLORIDE: 600; 310; 30; 20 INJECTION, SOLUTION INTRAVENOUS at 12:33

## 2022-11-15 RX ADMIN — SODIUM CHLORIDE, PRESERVATIVE FREE 10 ML: 5 INJECTION INTRAVENOUS at 09:37

## 2022-11-15 RX ADMIN — METRONIDAZOLE 500 MG: 500 INJECTION, SOLUTION INTRAVENOUS at 23:31

## 2022-11-15 RX ADMIN — VANCOMYCIN HYDROCHLORIDE 1250 MG: 10 INJECTION, POWDER, LYOPHILIZED, FOR SOLUTION INTRAVENOUS at 21:37

## 2022-11-15 RX ADMIN — SODIUM CHLORIDE, PRESERVATIVE FREE 10 ML: 5 INJECTION INTRAVENOUS at 09:38

## 2022-11-15 RX ADMIN — LIDOCAINE HYDROCHLORIDE 5 ML: 10 INJECTION, SOLUTION EPIDURAL; INFILTRATION; INTRACAUDAL; PERINEURAL at 09:21

## 2022-11-15 RX ADMIN — VANCOMYCIN HYDROCHLORIDE 1250 MG: 10 INJECTION, POWDER, LYOPHILIZED, FOR SOLUTION INTRAVENOUS at 11:29

## 2022-11-15 RX ADMIN — CEFEPIME HYDROCHLORIDE 2000 MG: 2 INJECTION, POWDER, FOR SOLUTION INTRAMUSCULAR; INTRAVENOUS at 12:52

## 2022-11-15 RX ADMIN — SODIUM CHLORIDE 25 ML: 9 INJECTION, SOLUTION INTRAVENOUS at 21:36

## 2022-11-15 RX ADMIN — SODIUM CHLORIDE 25 ML: 9 INJECTION, SOLUTION INTRAVENOUS at 01:11

## 2022-11-15 RX ADMIN — SODIUM CHLORIDE, POTASSIUM CHLORIDE, SODIUM LACTATE AND CALCIUM CHLORIDE: 600; 310; 30; 20 INJECTION, SOLUTION INTRAVENOUS at 03:45

## 2022-11-15 RX ADMIN — ONDANSETRON 4 MG: 4 TABLET, ORALLY DISINTEGRATING ORAL at 17:33

## 2022-11-15 ASSESSMENT — PAIN SCALES - GENERAL
PAINLEVEL_OUTOF10: 0
PAINLEVEL_OUTOF10: 5
PAINLEVEL_OUTOF10: 0
PAINLEVEL_OUTOF10: 3

## 2022-11-15 ASSESSMENT — PAIN DESCRIPTION - DESCRIPTORS
DESCRIPTORS: ACHING
DESCRIPTORS: ACHING

## 2022-11-15 ASSESSMENT — PAIN - FUNCTIONAL ASSESSMENT: PAIN_FUNCTIONAL_ASSESSMENT: ACTIVITIES ARE NOT PREVENTED

## 2022-11-15 ASSESSMENT — PAIN DESCRIPTION - LOCATION
LOCATION: HEAD
LOCATION: HEAD

## 2022-11-15 ASSESSMENT — PAIN DESCRIPTION - ONSET: ONSET: ON-GOING

## 2022-11-15 ASSESSMENT — PAIN DESCRIPTION - PAIN TYPE: TYPE: ACUTE PAIN

## 2022-11-15 ASSESSMENT — PAIN DESCRIPTION - ORIENTATION: ORIENTATION: MID

## 2022-11-15 ASSESSMENT — PAIN DESCRIPTION - FREQUENCY: FREQUENCY: INTERMITTENT

## 2022-11-15 NOTE — PROGRESS NOTES
Clinical Pharmacy Progress Note    Vancomycin - Management by Pharmacy    Consult Date(s): 11/14/22  Consulting Provider(s): Padmini Urrutia MD    Assessment / Plan  SSTI - Vancomycin  Concurrent Antimicrobials: Cefepime (Day # 1)  Day of Vanc Therapy / Ordered Duration: 1 / 5  Current Dosing Method: Bayesian-Guided AUC Dosing  Therapeutic Goal: AUC < 500 mg/L*hr  Current Dose / Plan:   Patient with stable renal function, Scr 0.6 mg/dL today, (baseline ~0.5 mg/dL). Will give a LD of 2000 mg IV x Once followed by 1250 mg IV q12hr as this dose predicts an AUC of 463 mg/L*hr and ssTrough of 11.9 mg/L. Will check a level in ~48 hours. Will continue to monitor clinical condition and make adjustments to regimen as appropriate. Thank you for consulting Pharmacy! Julienne Godinez, PharmD  Main Pharmacy: [id number]  11/14/2022 9:01 PM      Subjective/Objective:   Bria Mcarthur is a 65 y.o. female with a PMHx significant for CAD, HTN, Migraine, Prediabetes, sleep apnea, open repair of recurrent incisional hernia, who is admitted with wound wound discharge and pain. Pharmacy is consulted to dose vancomycin. Ht Readings from Last 1 Encounters:   11/14/22 5' 7\" (1.702 m)     Wt Readings from Last 1 Encounters:   11/14/22 280 lb (127 kg)     Current & Prior Antimicrobial Regimen(s):  Cefepime (11/14 - Current)    Vancomycin Level(s) / Doses:    Date Time Dose Type of Level / Level Interpretation                 Note: Serum levels collected for AUC-based dosing may be high if collected in close proximity to the dose administered. This is not necessarily indicative of toxicity. Cultures & Sensitivities:    Date Site Micro Susceptibility / Result                 Recent Labs     11/14/22  1924   CREATININE 0.6   BUN 15   WBC 6.7       Estimated Creatinine Clearance: 145 mL/min (based on SCr of 0.6 mg/dL). Additional Lab Values / Findings of Note:    No results for input(s): PROCAL in the last 72 hours.

## 2022-11-15 NOTE — PROCEDURES
IR Brief Postoperative Note    Axel Cruz  YOB: 1965  [id number]    Pre-operative Diagnosis: abdominal wall hematoma    Post-operative Diagnosis: Same    Procedure: IR drain    Anesthesia: moderate    Surgeons/Assistants: jennifer    Estimated Blood Loss: Minimal    Complications: none    Specimens: were obtained    See full procedure dictation to follow      Juanjo Amor MD MD  11/15/2022

## 2022-11-15 NOTE — PROGRESS NOTES
Clinical Pharmacy Progress Note    Vancomycin - Management by Pharmacy    Consult Date(s): 11/14/22  Consulting Provider(s): Subhash Powers MD    Assessment / Plan  SSTI - Vancomycin  Concurrent Antimicrobials:   Cefepime - Day #1  Metronidazole - Day #1  Day of Vanc Therapy / Ordered Duration: #2 of 5  Current Dosing Method: Bayesian-Guided AUC Dosing  Therapeutic Goal: AUC < 500 mg/L*hr  Current Dose / Plan:   Received 2000mg IV x1 loading dose 11/14, now on 1250mg IV q12h. Renal function stable; SCr 0.6. Will continue current dose. Regimen predicts AUC of  463 mg/L*hr and steady state trough of 11.9 mg/L. Vancomycin level ordered for tomorrow AM, Wed 11/16. Will continue to monitor clinical condition and make adjustments to regimen as appropriate. Please call with questions--  Lakshmi Blackwood, NikhilD, BCPS  Wireless: [id number]   11/15/2022 9:11 AM      Interval update: No acute events overnight. Surgery discussing IR drain vs washout. Subjective/Objective:   Kayode Cuevas is a 65 y.o. female with a PMHx significant for CAD, HTN, Migraine, Prediabetes, sleep apnea, open repair of recurrent incisional hernia, who is admitted with wound wound discharge and pain. Pharmacy is consulted to dose vancomycin. Ht Readings from Last 1 Encounters:   11/15/22 5' 7.5\" (1.715 m)     Wt Readings from Last 1 Encounters:   11/15/22 276 lb 10.8 oz (125.5 kg)     Current & Prior Antimicrobial Regimen(s):  Cefepime (11/14-current)  Metronidazole (11/15-current)  Vancomycin - Pharmacy to dose   11/14  1250mg IV q12h (11/15-current)    Vancomycin Level(s) / Doses:    Date Time Dose Type of Level / Level Interpretation                 Note: Serum levels collected for AUC-based dosing may be high if collected in close proximity to the dose administered. This is not necessarily indicative of toxicity.     Cultures & Sensitivities:    Date Site Micro Susceptibility / Result                 Recent Labs 11/14/22  1924 11/15/22  0506   CREATININE 0.6 0.6   BUN 15 13   WBC 6.7 5.9         Estimated Creatinine Clearance: 145 mL/min (based on SCr of 0.6 mg/dL). Additional Lab Values / Findings of Note:    No results for input(s): PROCAL in the last 72 hours.

## 2022-11-15 NOTE — PLAN OF CARE
Problem: Pain  Goal: Verbalizes/displays adequate comfort level or baseline comfort level  11/15/2022 1218 by Natividad Sandoval RN  Outcome: Progressing  Patient has denied pain or discomfort this shift. She was resting comfortably at [address] rounds. Plans to continue to monitor for change in level of comfort and to treat as needed. Problem: ABCDS Injury Assessment  Goal: Absence of physical injury  11/15/2022 1218 by Natividad Sandoval RN  Outcome: Progressing  Patient has remained free of in jury. he ambulates in room independently with a steady gait. She rates a low fall risk. Fall safety contract reviewed with patient. Will continue to keep room well lit and free of clutter. Will continue to monitor for safety.   11/15/2022 0502 by Susy Colby RN  Outcome: Progressing  Flowsheets (Taken 11/15/2022 0502)  Absence of Physical Injury: Implement safety measures based on patient assessment     11/15/2022 0502 by Susy Colby RN  Outcome: Progressing  Flowsheets  Taken 11/15/2022 0502  Verbalizes/displays adequate comfort level or baseline comfort level:   Encourage patient to monitor pain and request assistance   Assess pain using appropriate pain scale  Taken 11/15/2022 0103  Verbalizes/displays adequate comfort level or baseline comfort level: Encourage patient to monitor pain and request assistance  Taken 11/15/2022 0041  Verbalizes/displays adequate comfort level or baseline comfort level: Encourage patient to monitor pain and request assistance

## 2022-11-15 NOTE — PROGRESS NOTES
Surgery Daily Progress Note  Latasha Peña  CC:  Concern for wound infection    Subjective : No acute events overnight. HDS, afebrile. Reports itching in groin. Denies n/v. Does have some discomfort surrounding midline. Objective    Infusions:   sodium chloride 25 mL (11/15/22 0448)    lactated ringers 100 mL/hr at 11/15/22 0345        I/O:No intake/output data recorded. Wt Readings from Last 1 Encounters:   11/15/22 276 lb 10.8 oz (125.5 kg)                 LABS:    Recent Labs     11/14/22  1924 11/15/22  0506   WBC 6.7 5.9   HGB 12.1 10.7*   HCT 37.4 31.3*   MCV 96.2 91.7    293        Recent Labs     11/14/22  1924 11/15/22  0048 11/15/22  0506     --  135*   K 5.6* 4.8 4.0     --  105   CO2 22  --  21   PHOS  --   --  3.1   BUN 15  --  13   CREATININE 0.6  --  0.6      No results for input(s): AST, ALT, ALB, BILIDIR, BILITOT, ALKPHOS in the last 72 hours. No results for input(s): LIPASE, AMYLASE in the last 72 hours. Recent Labs     11/15/22  0506   INR 1.38*      No results for input(s): CKTOTAL, CKMB, CKMBINDEX, TROPONINI in the last 72 hours. Exam:/70   Pulse 74   Temp 97.3 °F (36.3 °C) (Oral)   Resp 16   Ht 5' 7.5\" (1.715 m)   Wt 276 lb 10.8 oz (125.5 kg)   SpO2 96%   BMI 42.69 kg/m²   General appearance: alert, no acute distress, grooming appropriate  Eyes: No scleral icterus, EOM grossly intact  Neck: trachea midline, no JVD, no lymphadenopathy, neck supple  Chest/Lungs: Normal effort with no accessory muscle use on RA  Cardiovascular: RRR, well-perfused  Abdomen: Soft, appropriately-tender, incision c/d/i and well approximated with Prineo  Skin: Warm and dry; erythema extending superiorly, laterally and inferiorly to the bilateral thigh from midline incision site.  Induration at the midline incision site, no purulent drainage  Extremities: No edema, no cyanosis  Genitourinary: Warm, erythema at bilateral groin and vaginal area  Neuro: A&Ox3, no focal deficits, sensation intact      ASSESSMENT/PLAN: Pt. is a 65 y.o. female s/p open repair of recurrent incisional hernia with retrorectus mesh placement and bilateral posterior component separation for a recurrent incisional hernia on 11/03    - Cont abx  - NPO  - IVF  - will review CT and determine if pt will benefit from IR drain vs washout.   - Cont hold lovenox at this time    Alec Chi CNP  11/15/2022  6:34 AM  marques

## 2022-11-15 NOTE — PROGRESS NOTES
[facility]  Surgery  PRE-OP NOTE  Resident Note     Procedure:Open abdominal wall exploration with washout    Consent: Written consent obtained from pt and placed in chart    Labs      Recent Labs     224 11/15/22  0506   WBC 6.7 5.9   HGB 12.1 10.7*   HCT 37.4 31.3*   MCV 96.2 91.7    293        Recent Labs     11/14/22  1924 11/15/22  0048 11/15/22  0506     --  135*   K 5.6* 4.8 4.0     --  105   CO2 22  --  21   PHOS  --   --  3.1   BUN 15  --  13   CREATININE 0.6  --  0.6      No results for input(s): AST, ALT, ALB, BILIDIR, BILITOT, ALKPHOS in the last 72 hours. No results for input(s): LIPASE, AMYLASE in the last 72 hours. Recent Labs     11/15/22  0506   INR 1.38*      No results for input(s): CKTOTAL, CKMB, CKMBINDEX, TROPONINI in the last 72 hours. CXR: 11/3, reviewed    EK/3 reviewed    Orders:   1. Diet: NPO since 0030 this am,  IVF (LR @ 100mL/hr)  2. Pre op Medications:  Cont cefepime, vanc, and flagyl. Lovenox held    3. Labs to be drawn: Type and Screen, INR, renal, mag, and CBC all collected this am.   4. Ensure 2 functional peripheral IVs are available prior to transport to the OR   5. hCG qualitative 11/15, negative.      Marita Sagastume DO   PGY1, General Surgery  11/15/22  9:03 AM  Pager # [phone]

## 2022-11-15 NOTE — PROGRESS NOTES
Pharmacy Note - Extended Infusion Beta-Lactam Adjustment    Cefepime ordered for treatment of Skin and Soft Tissue Infection. Per [facility] Extended Infusion Beta-Lactam Policy, cefepime will be changed to 2000 mg every 8 hours. Estimated Creatinine Clearance: Estimated Creatinine Clearance: 145 mL/min (based on SCr of 0.6 mg/dL). BMI: Body mass index is 43.85 kg/m². Rationale for Adjustment: Agent demonstrates time-dependent effect on bacterial eradication. Extended-infusion dosing strategy aims to enhance microbiologic and clinical efficacy. Pharmacy will continue to monitor cultures and sensitivities (where available) and adjust dose as necessary. Please call with any questions.   Jazmyn Hawkins, PharmD  Main Pharmacy: [id number]

## 2022-11-15 NOTE — CARE COORDINATION
CM following for discharge planning. Pt is from home with spouse with no services, possible HHC at discharge. Pt prepped for OR today for Procedure:Open abdominal wall exploration with washout. IV abx.      Anthony Mccormick RN, BSN, [address]  Case Management Department  [phone]

## 2022-11-15 NOTE — H&P
Patient:  Peg Manjarrez   :   1965      Relevant clinical history, particularly as it involves the pending procedure, was reviewed and discussed. The procedure including risks and benefits was discussed at length with the patient (or designated family member) and all questions were answered. Informed consent to proceed with the procedure was given. Vital signs were monitored and documented by the Radiology nurse. Targeted physical examination  Heart : regular rate and rhythm  Lungs : clear, breathing easily  Condition : stable    [facility] nurses notes reviewed and agreed. Past Medical History:        Diagnosis Date    Arrhythmia     pt reports intermittent arrhythmias -- Dr. Stanley Olmedo is cardiologist, doesn't take meds    CAD (coronary artery disease)     Cataplexy     Essential hypertension 09/15/2017    High blood pressure     Migraine     Narcolepsy     takes naps to prevent    Pneumonia     2022    Prediabetes     Presence of upper and lower permanent dental bridges     Recurrent incisional hernia     Unspecified sleep apnea     NO CPAP    Varicose veins of lower extremities 10/09/2011       Past Surgical History:           Procedure Laterality Date    COLONOSCOPY      ENDOMETRIAL ABLATION  2009    TUBAL LIGATION      tubes tied 13 yrs ago -- [address] N/A 12/3/2019    OPEN INCISIONAL HERNIA REPAIR WITH UMBILECTOMY performed by Joni Adams MD at [address] [facility] N/A 11/3/2022    OPEN REPAIR OF RECURRENT INCISIONAL HERNIA performed by Joni Adams MD at [address]:  Patient has no known allergies. Medications:   Home Meds  No current facility-administered medications on file prior to encounter.      Current Outpatient Medications on File Prior to Encounter   Medication Sig Dispense Refill    Aspirin-Acetaminophen-Caffeine (EXCEDRIN MIGRAINE PO) Take 1 tablet by mouth as needed      docusate sodium (COLACE) 100 MG planned

## 2022-11-15 NOTE — PLAN OF CARE
Problem: Discharge Planning  Goal: Discharge to home or other facility with appropriate resources  Outcome: Progressing  Flowsheets  Taken 11/15/2022 0502  Discharge to home or other facility with appropriate resources:   Identify barriers to discharge with patient and caregiver   Identify discharge learning needs (meds, wound care, etc)   Arrange for needed discharge resources and transportation as appropriate    Problem: Pain  Goal: Verbalizes/displays adequate comfort level or baseline comfort level  Outcome: Progressing  Flowsheets  Taken 11/15/2022 0502  Verbalizes/displays adequate comfort level or baseline comfort level:   Encourage patient to monitor pain and request assistance   Assess pain using appropriate pain scale       Problem: ABCDS Injury Assessment  Goal: Absence of physical injury  Outcome: Progressing  Flowsheets (Taken 11/15/2022 0502)  Absence of Physical Injury: Implement safety measures based on patient assessment

## 2022-11-15 NOTE — H&P
General Surgery   Resident H&P Note    Reason for Consult: concern for wound infection    History of Present Illness:   Jocelyne Paulinow is a 65 y.o. female who is s/p open repair of recurrent incisional hernia with retrorectus mesh placement and bilateral posterior component separation for a recurrent incisional hernia on 11/03 with Dr. Dino Good who now presents to the ED for pruritic rash by her incisions and groin area. This itchiness started while she was still in the hospital. She believes it has worsened since discharge and has spread to her vaginal area, thighs and folds over the past several days. She denies any abdominal pain unrelated to her incisions, purulence from her midline incision or recent fever or chills. Patient reports that she has tried lotion but has not helped. She denies any underlying allergies to tape. Past Medical History:        Diagnosis Date    Arrhythmia     pt reports intermittent arrhythmias -- Dr. 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     Medications:   Home Meds  No current facility-administered medications on file prior to encounter. Current Outpatient Medications on File Prior to Encounter   Medication Sig Dispense Refill    Aspirin-Acetaminophen-Caffeine (EXCEDRIN MIGRAINE PO) Take 1 tablet by mouth as needed      docusate sodium (COLACE) 100 MG capsule Take 1 capsule by mouth 2 times daily as needed for Constipation (Please take while taking narcotic pain medicine. If you develop loose or watery stools, then stop taking.) Please take while taking narcotic pain medicine. If you develop loose or watery stools, then stop taking. 28 capsule 0    ibuprofen (ADVIL;MOTRIN) 800 MG tablet Take 1 tablet by mouth every 8 hours as needed for Pain 15 tablet 0    ascorbic acid (VITAMIN C) 500 MG tablet Take 500 mg by mouth daily. Current Meds  diphenhydrAMINE (BENADRYL) injection 25 mg, Once      Family History:   Family History   Problem Relation Age of Onset    Diabetes Mother     High Blood Pressure Mother     Mental Illness Father     Arthritis Father     Rheum Arthritis Neg Hx     Osteoarthritis Neg Hx     Asthma Neg Hx     Breast Cancer Neg Hx     Cancer Neg Hx     Heart Failure Neg Hx     High Cholesterol Neg Hx     Hypertension Neg Hx     Migraines Neg Hx     Ovarian Cancer Neg Hx     Rashes/Skin Problems Neg Hx     Seizures Neg Hx     Stroke Neg Hx     Thyroid Disease Neg Hx        Social History:   TOBACCO:   reports that she has never smoked. She has never used smokeless tobacco.  ETOH:   reports no history of alcohol use. DRUGS:   reports current drug use. Drug: Opiates . Review of Systems:   A 14 point review of systems was conducted, significant findings as noted in HPI. All other systems negative.      Physical exam:    Vitals:    11/14/22 1812   BP: 116/83   Pulse: 85   Resp: 16   Temp: 98.1 °F (36.7 °C)   TempSrc: Oral   SpO2: 100%   Weight: 280 lb (127 kg)   Height: 5' 7\" (1.702 m)       General appearance: alert, no acute distress, grooming appropriate  Eyes: No scleral icterus, EOM grossly intact  Neck: trachea midline, no JVD, no lymphadenopathy, neck supple  Chest/Lungs: Normal effort with no accessory muscle use on RA  Cardiovascular: RRR, well-perfused  Abdomen: Soft, appropriately-tender, incision c/d/i and well approximated with Prineo  Skin: Warm and dry; erythema extending superiorly, laterally and inferiorly to the bilateral thigh from midline incision site. Induration at the midline incision site, no purulent drainage  Extremities: No edema, no cyanosis  Genitourinary: Warm, erythema at bilateral groin and vaginal area  Neuro: A&Ox3, no focal deficits, sensation intact        Labs:    CBC: No results for input(s): WBC, HGB, HCT, MCV, PLT in the last 72 hours. BMP: No results for input(s): NA, K, CL, CO2, PHOS, BUN, CREATININE, CA in the last 72 hours. PT/INR: No results for input(s): PROTIME, INR in the last 72 hours. APTT: No results for input(s): APTT in the last 72 hours. Liver Profile:   Lab Results   Component Value Date/Time    AST 11 03/03/2022 08:12 AM    ALT 17 03/03/2022 08:12 AM    BILIDIR <0.2 06/10/2021 12:42 PM    BILITOT <0.2 03/03/2022 08:12 AM    ALKPHOS 73 03/03/2022 08:12 AM     Lab Results   Component Value Date/Time    CHOL 208 03/03/2022 08:12 AM    HDL 52 03/03/2022 08:12 AM    HDL 49 10/05/2011 04:25 PM    TRIG 58 03/03/2022 08:12 AM     UA:   Lab Results   Component Value Date/Time    COLORU Not Entered 07/17/2019 01:48 PM    PHUR 6.0 07/17/2019 01:48 PM    WBCUA 3-5 08/20/2017 08:45 PM    RBCUA 0-2 08/20/2017 08:45 PM    MUCUS 1+ 08/20/2017 08:45 PM    BACTERIA 2+ 08/20/2017 08:45 PM    CLARITYU Not Entered 07/17/2019 01:48 PM    SPECGRAV 1.025 07/17/2019 01:48 PM    LEUKOCYTESUR Negative 07/17/2019 01:48 PM    UROBILINOGEN 1.0 07/17/2019 01:48 PM    BILIRUBINUR Negative 07/17/2019 01:48 PM    BLOODU TRACE-INTACT 07/17/2019 01:48 PM    GLUCOSEU Negative 07/17/2019 01:48 PM       Imaging:   No orders to display     Assessment/Plan:   This is a 65 y.o. female who is s/p open repair of recurrent incisional hernia with retrorectus mesh placement and bilateral posterior component separation for a recurrent incisional hernia on 11/03 with Dr. Lucia Haywood who now presents to the ED for pruritic rash that started at her incision sites and has progressively spread to her groin, thighs and pannus. She is hemodynamically stable without leukocytosis and overall benign lab findings. CT scan abd/pelvis showing hyperdense fluid collection up to 10.5 cm x 14.6 cm x 9.6 cm at the anterior abdominal wall; and subcutaneous stranding along the R/L and anterior abdominal wall. - Admit to general surgery  - No emergent surgical intervention warranted at this time  - Keep NPO, start IVF for given possible need for surgical intervention tomorrow  - Hold Lovenox for potential IR drain placement in the morning  - Start Cefepime, Flagyl, Vanc      Patient was seen by senior resident and discussed with Dr. Reba Fox.     Florian Duncan DO, [facility]  PGY1, General Surgery  11/14/22  10:32 PM  [employer]  Pager: [phone]

## 2022-11-15 NOTE — PROCEDURES
DOUBLE PICC PROCEDURE NOTE  Chart reviewed for allergies, diagnosis, labs, known contraindications, reason for line placement and planned length of treatment. Informed consent noted to be signed and on chart. Insertion procedure discussed with patient/family member. Three patient identifiers - Patient name,   and MRN -  completed &  confirmed verbally. Time out performed Hat, mask and eye shield donned. PICC site cleaned with chlorhexidine wipes then scrubbed with Chloraprep one-step applicator for 30 seconds x 1. Hand Hygiene  performed with 3% Chlorhexidine surgical scrub x1 min prior to  Sterile gloves, sterile gown being donned. Patient draped using maximal sterile barrier technique ( head to toe ). PICC site scrubbed a 2nd time with Chloraprep One-Step applicator x 30 sec. Modified Seldinger  technique/ultrasound assisted and tip locating system utilized for insertion. 1% Lidocaine 5 ml injected intradermal pre-insertion. PICC tip location in the SVC confirmed by ECG technology. Positive brisk blood return obtained from all lumens  and each lumen flushed with 10 mls  0.9% Sterile Sodium Chloride. All lumens flush easily with no resistance. Valve placed on all lumens followed by Alcohol Swab Caps on end of each. CHG dressing in place. Catheter secured with non-sutured locking device per hospital protocol. Sterile Tegaderm applied over PICC site. Sterile field maintained during procedure. PICC insertion, rhythm and positioning wire (utilized prn) accounted  for post procedure and disposed of in sharps. Appearance of site is Clean dry and intact without bleeding or edema. All edges of Tegaderm occlusive. Site marked with date and initials of RN placing line. Teaching performed to pt/family and noted in education section. Bed placed in low position post-procedure.  Top 2 side rails in up position call button in reach.Pt. Response to procedure tolerated well. RN notified of all of the above.

## 2022-11-15 NOTE — PROGRESS NOTES
4 Eyes Admission Assessment     I agree as the admission nurse that 2 RN's have performed a thorough Head to Toe Skin Assessment on the patient. ALL assessment sites listed below have been assessed on admission. Areas assessed by both nurses:   [x]   Head, Face, and Ears   [x]   Shoulders, Back, and Chest  [x]   Arms, Elbows, and Hands   [x]   Coccyx, Sacrum, and Ischium  [x]   Legs, Feet, and Heels      - BLE Scattered bruising      -  Rash located on abdomen, rachel area, and BLE      - Surgical incision located on abdomen     Does the Patient have Skin Breakdown?   No         Ivan Prevention initiated:  No   Wound Care Orders initiated:  No      [facility] nurse consulted for Pressure Injury (Stage 3,4, Unstageable, DTI, NWPT, and Complex wounds) or Ivan score 18 or lower:  No      Nurse 1 eSignature: Electronically signed by Gianluca Antunez RN on 11/15/22 at 12:44 AM EST    **SHARE this note so that the co-signing nurse is able to place an eSignature**    Nurse 2 eSignature: Electronically signed by Christa Randolph RN on 11/15/22 at 2:14 AM EST

## 2022-11-16 LAB
ALBUMIN SERPL-MCNC: 3.3 G/DL (ref 3.4–5)
ANION GAP SERPL CALCULATED.3IONS-SCNC: 7 MMOL/L (ref 3–16)
BASOPHILS ABSOLUTE: 0 K/UL (ref 0–0.2)
BASOPHILS RELATIVE PERCENT: 1 %
BUN BLDV-MCNC: 8 MG/DL (ref 7–20)
CALCIUM SERPL-MCNC: 9.6 MG/DL (ref 8.3–10.6)
CHLORIDE BLD-SCNC: 102 MMOL/L (ref 99–110)
CO2: 26 MMOL/L (ref 21–32)
CREAT SERPL-MCNC: 0.6 MG/DL (ref 0.6–1.1)
EOSINOPHILS ABSOLUTE: 0.4 K/UL (ref 0–0.6)
EOSINOPHILS RELATIVE PERCENT: 8.6 %
GFR SERPL CREATININE-BSD FRML MDRD: >60 ML/MIN/{1.73_M2}
GLUCOSE BLD-MCNC: 102 MG/DL (ref 70–99)
HCT VFR BLD CALC: 31 % (ref 36–48)
HEMOGLOBIN: 11 G/DL (ref 12–16)
LYMPHOCYTES ABSOLUTE: 1.1 K/UL (ref 1–5.1)
LYMPHOCYTES RELATIVE PERCENT: 22.4 %
MAGNESIUM: 1.9 MG/DL (ref 1.8–2.4)
MCH RBC QN AUTO: 32.1 PG (ref 26–34)
MCHC RBC AUTO-ENTMCNC: 35.5 G/DL (ref 31–36)
MCV RBC AUTO: 90.5 FL (ref 80–100)
MONOCYTES ABSOLUTE: 0.4 K/UL (ref 0–1.3)
MONOCYTES RELATIVE PERCENT: 8.3 %
NEUTROPHILS ABSOLUTE: 2.9 K/UL (ref 1.7–7.7)
NEUTROPHILS RELATIVE PERCENT: 59.7 %
PDW BLD-RTO: 13.2 % (ref 12.4–15.4)
PHOSPHORUS: 2.7 MG/DL (ref 2.5–4.9)
PLATELET # BLD: 258 K/UL (ref 135–450)
PMV BLD AUTO: 7.9 FL (ref 5–10.5)
POTASSIUM SERPL-SCNC: 3.9 MMOL/L (ref 3.5–5.1)
RBC # BLD: 3.43 M/UL (ref 4–5.2)
SODIUM BLD-SCNC: 135 MMOL/L (ref 136–145)
VANCOMYCIN RANDOM: 15.9 UG/ML
WBC # BLD: 4.8 K/UL (ref 4–11)

## 2022-11-16 PROCEDURE — 85025 COMPLETE CBC W/AUTO DIFF WBC: CPT

## 2022-11-16 PROCEDURE — 1200000000 HC SEMI PRIVATE

## 2022-11-16 PROCEDURE — 2580000003 HC RX 258: Performed by: STUDENT IN AN ORGANIZED HEALTH CARE EDUCATION/TRAINING PROGRAM

## 2022-11-16 PROCEDURE — 6370000000 HC RX 637 (ALT 250 FOR IP)

## 2022-11-16 PROCEDURE — 6370000000 HC RX 637 (ALT 250 FOR IP): Performed by: STUDENT IN AN ORGANIZED HEALTH CARE EDUCATION/TRAINING PROGRAM

## 2022-11-16 PROCEDURE — 6360000002 HC RX W HCPCS

## 2022-11-16 PROCEDURE — 83735 ASSAY OF MAGNESIUM: CPT

## 2022-11-16 PROCEDURE — 2580000003 HC RX 258

## 2022-11-16 PROCEDURE — 2500000003 HC RX 250 WO HCPCS

## 2022-11-16 PROCEDURE — 80202 ASSAY OF VANCOMYCIN: CPT

## 2022-11-16 PROCEDURE — 80069 RENAL FUNCTION PANEL: CPT

## 2022-11-16 RX ORDER — POLYETHYLENE GLYCOL 3350 17 G/17G
17 POWDER, FOR SOLUTION ORAL DAILY
Status: DISCONTINUED | OUTPATIENT
Start: 2022-11-16 | End: 2022-11-17

## 2022-11-16 RX ORDER — DOCUSATE SODIUM 100 MG/1
100 CAPSULE, LIQUID FILLED ORAL DAILY
Status: DISCONTINUED | OUTPATIENT
Start: 2022-11-16 | End: 2022-11-17

## 2022-11-16 RX ORDER — MAGNESIUM SULFATE 1 G/100ML
1000 INJECTION INTRAVENOUS ONCE
Status: COMPLETED | OUTPATIENT
Start: 2022-11-16 | End: 2022-11-16

## 2022-11-16 RX ADMIN — SODIUM CHLORIDE, PRESERVATIVE FREE 10 ML: 5 INJECTION INTRAVENOUS at 09:24

## 2022-11-16 RX ADMIN — ENOXAPARIN SODIUM 30 MG: 100 INJECTION SUBCUTANEOUS at 10:06

## 2022-11-16 RX ADMIN — VANCOMYCIN HYDROCHLORIDE 1500 MG: 10 INJECTION, POWDER, LYOPHILIZED, FOR SOLUTION INTRAVENOUS at 21:18

## 2022-11-16 RX ADMIN — METRONIDAZOLE 500 MG: 500 INJECTION, SOLUTION INTRAVENOUS at 09:16

## 2022-11-16 RX ADMIN — POLYETHYLENE GLYCOL 3350 17 G: 17 POWDER, FOR SOLUTION ORAL at 16:54

## 2022-11-16 RX ADMIN — ENOXAPARIN SODIUM 30 MG: 100 INJECTION SUBCUTANEOUS at 20:25

## 2022-11-16 RX ADMIN — CEFEPIME HYDROCHLORIDE 2000 MG: 2 INJECTION, POWDER, FOR SOLUTION INTRAMUSCULAR; INTRAVENOUS at 12:57

## 2022-11-16 RX ADMIN — MAGNESIUM SULFATE HEPTAHYDRATE 1000 MG: 1 INJECTION, SOLUTION INTRAVENOUS at 10:06

## 2022-11-16 RX ADMIN — VANCOMYCIN HYDROCHLORIDE 1500 MG: 10 INJECTION, POWDER, LYOPHILIZED, FOR SOLUTION INTRAVENOUS at 09:15

## 2022-11-16 RX ADMIN — METRONIDAZOLE 500 MG: 500 INJECTION, SOLUTION INTRAVENOUS at 16:57

## 2022-11-16 RX ADMIN — POTASSIUM PHOSPHATE, MONOBASIC AND POTASSIUM PHOSPHATE, DIBASIC 15 MMOL: 224; 236 INJECTION, SOLUTION, CONCENTRATE INTRAVENOUS at 11:56

## 2022-11-16 RX ADMIN — DOCUSATE SODIUM 100 MG: 100 CAPSULE, LIQUID FILLED ORAL at 10:06

## 2022-11-16 RX ADMIN — SODIUM CHLORIDE 25 ML: 9 INJECTION, SOLUTION INTRAVENOUS at 04:03

## 2022-11-16 RX ADMIN — CEFEPIME HYDROCHLORIDE 2000 MG: 2 INJECTION, POWDER, FOR SOLUTION INTRAMUSCULAR; INTRAVENOUS at 20:25

## 2022-11-16 RX ADMIN — CEFEPIME HYDROCHLORIDE 2000 MG: 2 INJECTION, POWDER, FOR SOLUTION INTRAMUSCULAR; INTRAVENOUS at 04:04

## 2022-11-16 RX ADMIN — SODIUM CHLORIDE, PRESERVATIVE FREE 10 ML: 5 INJECTION INTRAVENOUS at 09:23

## 2022-11-16 ASSESSMENT — PAIN SCALES - GENERAL
PAINLEVEL_OUTOF10: 0

## 2022-11-16 NOTE — PROGRESS NOTES
Clinical Pharmacy Progress Note    Vancomycin - Management by Pharmacy    Consult Date(s): 11/14/22  Consulting Provider(s): Charissa Sevilla MD    Assessment / Plan  SSTI at site of hernia repair - Vancomycin  Concurrent Antimicrobials:   Cefepime - Day #2  Metronidazole - Day #2  Day of Vanc Therapy / Ordered Duration: #2 of 5  Current Dosing Method: Bayesian-Guided AUC Dosing  Therapeutic Goal: AUC < 500 mg/L*hr  Current Dose / Plan:   On 1250mg IV q12h. Renal function stable; SCr 0.6. Level today = 15.9 mg/L -drawn ~6h after previous dose. Calculated AUC is 377 mg/L*h with steady-state trough of 8.7 mg/L. Given AUC < 400 - will increase dose 1500mg IV q12h. Regimen predicts AUC of 452 mg/L*h. Repeat level will be ordered as clinically appropriate. Will continue to monitor clinical condition and make adjustments to regimen as appropriate. Please call with questions--  Haider Lancaster PharmD, BCPS  Wireless: [id number]   11/16/2022 8:24 AM      Interval update: IR drain placed 11/15 - cultures sent. Subjective/Objective:   Josey Rogers is a 65 y.o. female with a PMHx significant for CAD, HTN, Migraine, Prediabetes, sleep apnea, open repair of recurrent incisional hernia, who is admitted with wound wound discharge and pain. Pharmacy is consulted to dose vancomycin.     Ht Readings from Last 1 Encounters:   11/15/22 5' 7.5\" (1.715 m)     Wt Readings from Last 1 Encounters:   11/16/22 276 lb 14.4 oz (125.6 kg)     Current & Prior Antimicrobial Regimen(s):  Cefepime (11/14-current)  Metronidazole (11/15-current)  Vancomycin - Pharmacy to dose   11/14  1250mg IV q12h (11/15-11/16)  1500mg IV q12h (11/16-current)    Vancomycin Level(s) / Doses:    Date Time Dose Type of Level / Level Interpretation   11/16 0348 1250mg IV q12h Random = 15.9 mg/L Level drawn ~6h after prior dose  Calculated AUC = 377 mg/L*h with trough of 8.7 mg/L  Increase dose to 1500mg IV q12h          Note: Serum levels collected for AUC-based dosing may be high if collected in close proximity to the dose administered. This is not necessarily indicative of toxicity. Cultures & Sensitivities:    Date Site Micro Susceptibility / Result   11/15 Abdominal aspirate 2+ WBC, 1+ GPC            Recent Labs     11/14/22  1924 11/15/22  0506 11/16/22  0346 11/16/22  0348   CREATININE 0.6 0.6  --  0.6   BUN 15 13  --  8   WBC 6.7 5.9 4.8  --          Estimated Creatinine Clearance: 145 mL/min (based on SCr of 0.6 mg/dL). Additional Lab Values / Findings of Note:    No results for input(s): PROCAL in the last 72 hours.

## 2022-11-16 NOTE — PLAN OF CARE
Problem: Pain  Goal: Verbalizes/displays adequate comfort level or baseline comfort level  Outcome: Progressing  Patient has denied pain or discomfort this shift. She was resting comfortably up in the chair at last rounds. Instructed to notify of change in level of comfort. Will continue to monitor for comfort and treat as needed. Problem: ABCDS Injury Assessment  Goal: Absence of physical injury  Outcome: Progressing  Patient is alert, oriented and able to ambulate in room independently. Her gait is steady. She is up to chair at present with wheels locked and call light in reach. Will continue to monitor for safety.

## 2022-11-16 NOTE — PROGRESS NOTES
Physician Progress Note      PATIENT:               Gianfranco Estrada  CSN #:                  [id number]  :                       1965  ADMIT DATE:       2022 6:03 PM  [address] [place] DATE:  Anirudh Bryson  PROVIDER #:        Kyung Johnson TEXT:    Patient admitted with BMI 42.69. If possible, please document in progress   notes and discharge summary if you are evaluating and /or treating any of the   following: The medical record reflects the following:  Risk Factors: NA  Clinical Indicators: HT: 5'7.5\", WT: 276lbs, BMI: 42.69  Treatment: NA    ? Specificity of obesity and morbid obesity should be reported based on   physician documentation, as there are several published classifications and   definitions?  MS-DRG Training Guide. CDC:   https://cook-varner.info/. WHO:   http://andrew.bialejandra/. NIH:   LargeFood.be  Options provided:  -- Morbid obesity  -- BMI not clinically significant  -- Other - I will add my own diagnosis  -- Disagree - Not applicable / Not valid  -- Disagree - Clinically unable to determine / Unknown  -- Refer to Clinical Documentation Reviewer    PROVIDER RESPONSE TEXT:    This patient has morbid obesity. Query created by:  Max Ji on 11/15/2022 1:14 PM      Electronically signed by:  Ash Rm 2022 9:22 AM

## 2022-11-16 NOTE — PLAN OF CARE
Problem: Discharge Planning  Goal: Discharge to home or other facility with appropriate resources  11/16/2022 0001 by Julisa Brock RN  Outcome: Progressing  Flowsheets  Taken 11/16/2022 0001  Discharge to home or other facility with appropriate resources:   Identify discharge learning needs (meds, wound care, etc)   Identify barriers to discharge with patient and caregiver   Arrange for needed discharge resources and transportation as appropriate  Taken 11/15/2022 2027  Discharge to home or other facility with appropriate resources: Identify barriers to discharge with patient and caregiver    Problem: Pain  Goal: Verbalizes/displays adequate comfort level or baseline comfort level  11/16/2022 0001 by Julisa Brock RN  Outcome: Progressing  Flowsheets (Taken 11/16/2022 0001)  Verbalizes/displays adequate comfort level or baseline comfort level:   Encourage patient to monitor pain and request assistance   Assess pain using appropriate pain scale   Administer analgesics based on type and severity of pain and evaluate response       Problem: ABCDS Injury Assessment  Goal: Absence of physical injury  11/16/2022 0001 by Julisa Brock RN  Outcome: Progressing  Flowsheets (Taken 11/15/2022 2217)  Absence of Physical Injury: Implement safety measures based on patient assessment

## 2022-11-16 NOTE — PROGRESS NOTES
Surgery Daily Progress Note  Juwan Peña  CC:  Concern for wound infection    Subjective :  No acute events overnight. IR drain placed yesterday. Had some nausea post-procedure that is resolving this am. Rash near groin feels improved somewhat, denies abdominal pain. Objective    Infusions:   sodium chloride 25 mL (11/16/22 0403)    lactated ringers 50 mL/hr at 11/15/22 2023    sodium chloride          I/O:No intake/output data recorded. Wt Readings from Last 1 Encounters:   11/16/22 276 lb 14.4 oz (125.6 kg)                 LABS:    Recent Labs     11/15/22  0506 11/16/22 0346   WBC 5.9 4.8   HGB 10.7* 11.0*   HCT 31.3* 31.0*   MCV 91.7 90.5    258          Recent Labs     11/15/22  0506 11/16/22  0348   * 135*   K 4.0 3.9    102   CO2 21 26   PHOS 3.1 2.7   BUN 13 8   CREATININE 0.6 0.6        No results for input(s): AST, ALT, ALB, BILIDIR, BILITOT, ALKPHOS in the last 72 hours. No results for input(s): LIPASE, AMYLASE in the last 72 hours. Recent Labs     11/15/22  0506   INR 1.38*        No results for input(s): CKTOTAL, CKMB, CKMBINDEX, TROPONINI in the last 72 hours. Exam:/81   Pulse 90   Temp 96.9 °F (36.1 °C) (Oral)   Resp 16   Ht 5' 7.5\" (1.715 m)   Wt 276 lb 14.4 oz (125.6 kg)   SpO2 95%   BMI 42.73 kg/m²   General appearance: no acute distress, grooming appropriate  Eyes: No scleral icterus, EOM grossly intact  Neck: trachea midline, no JVD, no lymphadenopathy, neck supple  Chest/Lungs: Normal effort with no accessory muscle use on RA  Cardiovascular: RRR, well-perfused  Abdomen: Soft, appropriately-tender, incision c/d/i and well approximated with Prineo. IR drain with dark old blood in bag. Skin: Warm and dry; erythema extending superiorly, laterally and inferiorly to the bilateral thigh from midline incision site.  Induration at the midline incision site, no purulent drainage  Extremities: No edema, no cyanosis  Genitourinary: Warm, erythema at bilateral groin and vaginal area  Neuro: A&Ox3, no focal deficits, sensation intact      ASSESSMENT/PLAN: Pt. is a 65 y.o. female s/p open repair of recurrent incisional hernia with retrorectus mesh placement and bilateral posterior component separation for a recurrent incisional hernia on 11/03    - Cont abx.   - Cx's growing 1+ GPC.  Will adjust abx's as cx's update.   - Cont regularar, carb control diet.   - SLIV  -Will add stool softener.   - Cont benadryl   - Cont lovenox    Dietbeba Begum DO   PGY1, General Surgery  11/16/22  6:26 AM  Pager # [phone]

## 2022-11-16 NOTE — CARE COORDINATION
CM following for discharge planning. Pt is from home with spouse, independent. Open abd exploration and wash out canceled. Pt had IR drain placed and picc line placed on 11/15/22. IV abx continue. CM to continue to follow for poss [facility] needs.      Lin Dwyer RN, BSN, [address]  Case Management Department  [phone]

## 2022-11-17 VITALS
SYSTOLIC BLOOD PRESSURE: 113 MMHG | RESPIRATION RATE: 16 BRPM | WEIGHT: 277.34 LBS | BODY MASS INDEX: 42.03 KG/M2 | DIASTOLIC BLOOD PRESSURE: 82 MMHG | TEMPERATURE: 98.4 F | HEART RATE: 84 BPM | HEIGHT: 68 IN | OXYGEN SATURATION: 96 %

## 2022-11-17 LAB
ALBUMIN SERPL-MCNC: 3.3 G/DL (ref 3.4–5)
ANION GAP SERPL CALCULATED.3IONS-SCNC: 7 MMOL/L (ref 3–16)
BASOPHILS ABSOLUTE: 0 K/UL (ref 0–0.2)
BASOPHILS RELATIVE PERCENT: 0.7 %
BUN BLDV-MCNC: 10 MG/DL (ref 7–20)
CALCIUM SERPL-MCNC: 9.4 MG/DL (ref 8.3–10.6)
CHLORIDE BLD-SCNC: 103 MMOL/L (ref 99–110)
CO2: 26 MMOL/L (ref 21–32)
CREAT SERPL-MCNC: 0.6 MG/DL (ref 0.6–1.1)
EOSINOPHILS ABSOLUTE: 0.4 K/UL (ref 0–0.6)
EOSINOPHILS RELATIVE PERCENT: 10.2 %
GFR SERPL CREATININE-BSD FRML MDRD: >60 ML/MIN/{1.73_M2}
GLUCOSE BLD-MCNC: 100 MG/DL (ref 70–99)
HCT VFR BLD CALC: 29.2 % (ref 36–48)
HEMOGLOBIN: 10.1 G/DL (ref 12–16)
LYMPHOCYTES ABSOLUTE: 1.1 K/UL (ref 1–5.1)
LYMPHOCYTES RELATIVE PERCENT: 25.1 %
MAGNESIUM: 2.1 MG/DL (ref 1.8–2.4)
MCH RBC QN AUTO: 31.5 PG (ref 26–34)
MCHC RBC AUTO-ENTMCNC: 34.6 G/DL (ref 31–36)
MCV RBC AUTO: 90.9 FL (ref 80–100)
MONOCYTES ABSOLUTE: 0.4 K/UL (ref 0–1.3)
MONOCYTES RELATIVE PERCENT: 8.8 %
NEUTROPHILS ABSOLUTE: 2.4 K/UL (ref 1.7–7.7)
NEUTROPHILS RELATIVE PERCENT: 55.2 %
PDW BLD-RTO: 13.2 % (ref 12.4–15.4)
PHOSPHORUS: 2.7 MG/DL (ref 2.5–4.9)
PLATELET # BLD: 247 K/UL (ref 135–450)
PMV BLD AUTO: 8 FL (ref 5–10.5)
POTASSIUM SERPL-SCNC: 4.3 MMOL/L (ref 3.5–5.1)
RBC # BLD: 3.21 M/UL (ref 4–5.2)
SODIUM BLD-SCNC: 136 MMOL/L (ref 136–145)
WBC # BLD: 4.4 K/UL (ref 4–11)

## 2022-11-17 PROCEDURE — 2500000003 HC RX 250 WO HCPCS

## 2022-11-17 PROCEDURE — 2580000003 HC RX 258

## 2022-11-17 PROCEDURE — 2580000003 HC RX 258: Performed by: STUDENT IN AN ORGANIZED HEALTH CARE EDUCATION/TRAINING PROGRAM

## 2022-11-17 PROCEDURE — 80069 RENAL FUNCTION PANEL: CPT

## 2022-11-17 PROCEDURE — 83735 ASSAY OF MAGNESIUM: CPT

## 2022-11-17 PROCEDURE — 6360000002 HC RX W HCPCS

## 2022-11-17 PROCEDURE — 6370000000 HC RX 637 (ALT 250 FOR IP)

## 2022-11-17 PROCEDURE — 85025 COMPLETE CBC W/AUTO DIFF WBC: CPT

## 2022-11-17 RX ORDER — DOXYCYCLINE 100 MG/1
100 TABLET ORAL 2 TIMES DAILY
Qty: 20 TABLET | Refills: 0 | Status: ON HOLD | OUTPATIENT
Start: 2022-11-17 | End: 2022-11-23 | Stop reason: SDUPTHER

## 2022-11-17 RX ORDER — BISACODYL 10 MG
10 SUPPOSITORY, RECTAL RECTAL ONCE
Status: COMPLETED | OUTPATIENT
Start: 2022-11-17 | End: 2022-11-17

## 2022-11-17 RX ORDER — POLYETHYLENE GLYCOL 3350 17 G/17G
17 POWDER, FOR SOLUTION ORAL 2 TIMES DAILY
Status: DISCONTINUED | OUTPATIENT
Start: 2022-11-17 | End: 2022-11-17 | Stop reason: HOSPADM

## 2022-11-17 RX ORDER — DOCUSATE SODIUM 100 MG/1
100 CAPSULE, LIQUID FILLED ORAL 2 TIMES DAILY
Status: DISCONTINUED | OUTPATIENT
Start: 2022-11-17 | End: 2022-11-17 | Stop reason: HOSPADM

## 2022-11-17 RX ORDER — DOCUSATE SODIUM 100 MG/1
100 CAPSULE, LIQUID FILLED ORAL 2 TIMES DAILY
Qty: 28 CAPSULE | Refills: 0 | Status: SHIPPED | OUTPATIENT
Start: 2022-11-17 | End: 2022-12-01

## 2022-11-17 RX ADMIN — ENOXAPARIN SODIUM 30 MG: 100 INJECTION SUBCUTANEOUS at 09:23

## 2022-11-17 RX ADMIN — CEFEPIME HYDROCHLORIDE 2000 MG: 2 INJECTION, POWDER, FOR SOLUTION INTRAMUSCULAR; INTRAVENOUS at 04:20

## 2022-11-17 RX ADMIN — SODIUM CHLORIDE, PRESERVATIVE FREE 10 ML: 5 INJECTION INTRAVENOUS at 09:24

## 2022-11-17 RX ADMIN — BISACODYL 10 MG: 10 SUPPOSITORY RECTAL at 09:23

## 2022-11-17 RX ADMIN — POLYETHYLENE GLYCOL (3350) 17 G: 17 POWDER, FOR SOLUTION ORAL at 09:45

## 2022-11-17 RX ADMIN — SODIUM PHOSPHATE, MONOBASIC, MONOHYDRATE AND SODIUM PHOSPHATE, DIBASIC, ANHYDROUS 15 MMOL: 276; 142 INJECTION, SOLUTION INTRAVENOUS at 08:22

## 2022-11-17 RX ADMIN — VANCOMYCIN HYDROCHLORIDE 1500 MG: 10 INJECTION, POWDER, LYOPHILIZED, FOR SOLUTION INTRAVENOUS at 09:30

## 2022-11-17 RX ADMIN — METRONIDAZOLE 500 MG: 500 INJECTION, SOLUTION INTRAVENOUS at 00:33

## 2022-11-17 RX ADMIN — CEFEPIME HYDROCHLORIDE 2000 MG: 2 INJECTION, POWDER, FOR SOLUTION INTRAMUSCULAR; INTRAVENOUS at 12:47

## 2022-11-17 RX ADMIN — DOCUSATE SODIUM 100 MG: 100 CAPSULE, LIQUID FILLED ORAL at 09:23

## 2022-11-17 RX ADMIN — METRONIDAZOLE 500 MG: 500 INJECTION, SOLUTION INTRAVENOUS at 09:28

## 2022-11-17 ASSESSMENT — PAIN SCALES - GENERAL
PAINLEVEL_OUTOF10: 0

## 2022-11-17 NOTE — PROGRESS NOTES
Discharge note: Patient has been seen by doctor. Discharge order obtained, and discharge instructions reviewed. Patient educated, using the teach back method, about follow up instructions and discharge instructions. A completed copy of the AVS instructions given to patient and all questions answered. IV catheter removed without complaints, catheter intact, site WNL. Pt educated on flushing her perc drain. Discharged to lobby via wheel chair per transportation.      Electronically signed by Arleth Hernandez RN on 11/17/2022 at 6:57 PM

## 2022-11-17 NOTE — CARE COORDINATION
CTN contacted Gabriela with [employer] [phone]. They have accepted this patient and will pull referral from [facility].  They will contact patient and make arrangements for [facility] by 11/19  Electronically signed by Taras Bauman LPN on 40/75/0465 at 1:32 PM

## 2022-11-17 NOTE — PLAN OF CARE
Problem: Discharge Planning  Goal: Discharge to home or other facility with appropriate resources  11/17/2022 0049 by Li Paiz, RN  Outcome: Progressing  11/16/2022 1602 by Tyra Maya RN  Outcome: Progressing     Problem: Pain  Goal: Verbalizes/displays adequate comfort level or baseline comfort level  11/17/2022 0049 by Li Paiz, RN  Outcome: Progressing  11/16/2022 1602 by Tyra Maya RN  Outcome: Progressing     Problem: ABCDS Injury Assessment  Goal: Absence of physical injury  11/17/2022 0049 by Li Paiz RN  Outcome: Progressing  11/16/2022 1602 by Tyra Maya RN  Outcome: Progressing

## 2022-11-17 NOTE — DISCHARGE INSTRUCTIONS
Discharge Instructions:    Diet:   You may resume a regular diet. Wound Care:   Drain should be flushed twice daily. Record drain output and bring numbers to office. You may continue to use benadryl as needed for your rash    Activity:   No heavy lifting greater than a milk jug until follow up. Bowel Regimen:   Opioid/Narcotic pain relievers have a common side effect of constipation; therefore, you have been provided with a prescription for a stool softener, Docusate (Colace). These medications are intended to help prevent you from experiencing this very common side effect and also help to regulate your bowels after surgery. If your stools become too loose and/or frequent, decrease the Colace to one pill one time each day. If your stools are still loose after this modification, stop taking this medication all together. Return Precautions:   Call/ Return to ED for increased redness, worsening pain, drainage from wound, fevers, or any other concerns about your incision or post op course. Follow up with Dr. Alanis Mohan on Monday. Please call [phone] to schedule your appointment.

## 2022-11-17 NOTE — DISCHARGE INSTR - COC
Continuity of Care Form    Patient Name: Dannie Ware   :  1965  MRN:  [id number]    Admit date:  2022  Discharge date:  ***    Code Status Order: Full Code   Advance Directives:     Admitting Physician:  Crystal De La Garza MD  PCP: Tico Taylor MD    Discharging Nurse: [facility] Unit/Room#: 8563/1747-63  Discharging Unit Phone Number: ***    Emergency Contact:   Extended Emergency Contact Information  Primary Emergency Contact:  Jessica Galo Phone: [phone]  Mobile Phone: [phone]  Relation: Spouse    Past Surgical History:  Past Surgical History:   Procedure Laterality Date    COLONOSCOPY      ENDOMETRIAL ABLATION  2009    TUBAL LIGATION      tubes tied 13 yrs ago -- [address] N/A 12/3/2019    OPEN INCISIONAL HERNIA REPAIR WITH UMBILECTOMY performed by Crystal De La Garza MD at [address] N/A 11/3/2022    OPEN REPAIR OF RECURRENT INCISIONAL HERNIA performed by Crystal De La Garza MD at [address]       Immunization History:   Immunization History   Administered Date(s) Administered    Tdap (Boostrix, Adacel) 10/05/2011       Active Problems:  Patient Active Problem List   Diagnosis Code    Narcolepsy G47.419    Cataplexy G47. 411    Varicose veins of both lower extremities I83.93    History of migraine Z86.69    Essential hypertension I10    Plantar fasciitis of right foot M72.2    Incisional hernia, without obstruction or gangrene K43.2    Pain and swelling of right knee M25.561, M25.461    Recurrent incisional hernia K43.2    Cellulitis L03.90       Isolation/Infection:   Isolation            No Isolation          Patient Infection Status       None to display            Nurse Assessment:  Last Vital Signs: /82   Pulse 91   Temp 98.4 °F (36.9 °C) (Oral)   Resp 16   Ht 5' 7.5\" (1.715 m)   Wt 277 lb 5.4 oz (125.8 kg)   SpO2 96%   BMI 42.80 kg/m²     Last documented pain score (0-10 scale): Pain Level: 0  Last Weight:   Wt Readings from Last 1 Encounters:   11/17/22 277 lb 5.4 oz (125.8 kg)     Mental Status:  {IP PT MENTAL STATUS:20030}    IV Access:  { JOHN IV ACCESS:119230632}    Nursing Mobility/ADLs:  Walking   {CHP DME BSJK:994830014}  Transfer  {CHP DME QNTH:415454463}  Bathing  {CHP DME VJWX:323773814}  Dressing  {CHP DME ZRIZ:923192516}  Toileting  {CHP DME OIQV:281294327}  Feeding  {CHP DME SEFJ:848359695}  Med Admin  {P DME BUVC:537646203}  Med Delivery   { JOHN MED Delivery:517846408}    Wound Care Documentation and Therapy:  Incision 11/03/22 Abdomen Lower;Medial (Active)   Dressing Status Clean;Dry; Intact 11/16/22 1544   Incision Cleansed Not Cleansed 11/16/22 1544   Dressing/Treatment Open to air 11/08/22 1115   Closure Open to air;Surgical glue 11/16/22 1544   Margins Approximated 11/16/22 1544   Incision Assessment Bleeding 11/04/22 1056   Drainage Amount None 11/16/22 1544   Drainage Description Serosanguinous 11/16/22 1544   Odor None 11/16/22 1544   Adeline-incision Assessment Intact 11/16/22 1544   Number of days: 14        Elimination:  Continence: Bowel: {YES / QO:86025}  Bladder: {YES / IB:47257}  Urinary Catheter: {Urinary Catheter:871358959}   Colostomy/Ileostomy/Ileal Conduit: {YES / HT:36152}       Date of Last BM: ***    Intake/Output Summary (Last 24 hours) at 11/17/2022 1334  Last data filed at 11/17/2022 1220  Gross per 24 hour   Intake 3489.15 ml   Output 65 ml   Net 3424.15 ml     I/O last 3 completed shifts:   In: 3769.2 [P.O.:1580; IV Piggyback:2189.2]  Out: 145 [Drains:145]    Safety Concerns:     [address] JOHN Safety Concerns:354516166}    Impairments/Disabilities:      [address] JOHN Impairments/Disabilities:218473808}    Nutrition Therapy:  Current Nutrition Therapy:   [address] JOHN Diet List:391658773}    Routes of Feeding: {CHP DME Other Feedings:000388618}  Liquids: {Slp liquid thickness:67398}  Daily Fluid Restriction: {CHP DME Yes amt example:713663613}  Last Modified Barium Swallow with Video (Video Swallowing Test): {Done Not Done [facility]:632709481}    Treatments at the Time of Hospital Discharge:   Respiratory Treatments: ***  Oxygen Therapy:  {Therapy; copd oxygen:30575}  Ventilator:    { CC Vent GQ:556307449}    Rehab Therapies: {THERAPEUTIC INTERVENTION:7442521867}  Weight Bearing Status/Restrictions: { CC Weight Bearin}  Other Medical Equipment (for information only, NOT a DME order):  {EQUIPMENT:102959346}  Other Treatments: ***    Patient's personal belongings (please select all that are sent with patient):  {[facility] DME Belongings:588286314}    RN SIGNATURE:  {Esignature:934054718}    CASE MANAGEMENT/SOCIAL WORK SECTION    Inpatient Status Date: ***    Readmission Risk Assessment Score:  Readmission Risk              Risk of Unplanned Readmission:  13           Discharging to Facility/ Agency   Name:   Address:  Phone:  Fax:    Dialysis Facility (if applicable)   Name:  Address:  Dialysis Schedule:  Phone:  Fax:    / signature: {Esignature:798714969}    PHYSICIAN SECTION    Prognosis: Good    Condition at Discharge: Stable    Rehab Potential (if transferring to Rehab): Good    Recommended Labs or Other Treatments After Discharge:     Drain care/ flush. Please flush drain BID with 10 cc sterile water. Record output and bring with you to your follow up appointment. Physician Certification: I certify the above information and transfer of Axel Cruz  is necessary for the continuing treatment of the diagnosis listed and that she requires {Admit to Appropriate Level of Care:17985} for {GREATER/LESS:486682924} 30 days.      Update Admission H&P: {CHP DME Changes in RFNAE:760950576}    PHYSICIAN SIGNATURE:  {Esignature:907377701}

## 2022-11-17 NOTE — PROGRESS NOTES
Surgery Daily Progress Note  Rain Peña  CC:  Concern for wound infection    Subjective :  No acute events overnight. Remains HDS and afebrile, reports headache, and wants to have BM, no abdominal pain      Objective    Infusions:   sodium chloride 25 mL (11/16/22 0403)    sodium chloride          I/O:I/O last 3 completed shifts: In: 1000 [P.O.:1000]  Out: 120 [Drains:120]           Wt Readings from Last 1 Encounters:   11/17/22 277 lb 5.4 oz (125.8 kg)                 LABS:    Recent Labs     11/16/22 0346 11/17/22  0438   WBC 4.8 4.4   HGB 11.0* 10.1*   HCT 31.0* 29.2*   MCV 90.5 90.9    247          Recent Labs     11/16/22 0348 11/17/22  0438   * 136   K 3.9 4.3    103   CO2 26 26   PHOS 2.7 2.7   BUN 8 10   CREATININE 0.6 0.6        No results for input(s): AST, ALT, ALB, BILIDIR, BILITOT, ALKPHOS in the last 72 hours. No results for input(s): LIPASE, AMYLASE in the last 72 hours. Recent Labs     11/15/22  0506   INR 1.38*        No results for input(s): CKTOTAL, CKMB, CKMBINDEX, TROPONINI in the last 72 hours. Exam:/85   Pulse 90   Temp 97.6 °F (36.4 °C) (Oral)   Resp 17   Ht 5' 7.5\" (1.715 m)   Wt 277 lb 5.4 oz (125.8 kg)   SpO2 97%   BMI 42.80 kg/m²   General appearance: no acute distress, grooming appropriate  Eyes: No scleral icterus, EOM grossly intact  Neck: trachea midline, no JVD, no lymphadenopathy, neck supple  Chest/Lungs: Normal effort with no accessory muscle use on RA  Cardiovascular: RRR, well-perfused  Abdomen: Soft, appropriately-tender, incision c/d/i and well approximated with Prineo. IR drain with dark old blood in bag. Skin: Warm and dry; erythema extending superiorly, laterally and inferiorly to the bilateral thigh from midline incision site. Induration at the midline incision site, improving.  No purulent drainage  Extremities: No edema, no cyanosis  Genitourinary: Warm, erythema at bilateral groin and vaginal area  Neuro: A&Ox3, no focal deficits, sensation intact      ASSESSMENT/PLAN: Pt. is a 65 y.o. female s/p open repair of recurrent incisional hernia with retrorectus mesh placement and bilateral posterior component separation for a recurrent incisional hernia on 11/03     - Cont abx.   - Cx's growing 1+ GPC.  Will adjust abx's as cx's update.   - Cont regularar, carb control diet.   -Will increase bowel regimen by adding suppository   - Cont benadryl   - Cont lovenox    Lora Garcia DO   PGY1, General Surgery  11/17/22  6:27 AM  Pager # [phone]

## 2022-11-17 NOTE — PROGRESS NOTES
Clinical Pharmacy Progress Note    Vancomycin - Management by Pharmacy    Consult Date(s): 11/14/22  Consulting Provider(s): Tasha Britt MD    Assessment / Plan  SSTI at site of hernia repair - Vancomycin  Concurrent Antimicrobials:   Cefepime - Day #3  Metronidazole - Day #3  Day of Vanc Therapy / Ordered Duration: #3 of 5  Current Dosing Method: Bayesian-Guided AUC Dosing  Therapeutic Goal: AUC < 500 mg/L*hr  Current Dose / Plan: On 1500mg IV q12h - dose adjusted 11/16. Renal function stable; SCr 0.6. Will continue current dose - regimen predicts AUC of 452 mg/L*h. Repeat level will be ordered as clinically appropriate. Will continue to monitor clinical condition and make adjustments to regimen as appropriate. Please call with questions--  Nikhil AmezcuaD, BCPS  Wireless: [phone]   11/17/2022 9:50 AM      Interval update: Afebrile, HDS. Wound cx shows GPC on Gram Stain. Subjective/Objective:   Axel Cruz is a 65 y.o. female with a PMHx significant for CAD, HTN, Migraine, Prediabetes, sleep apnea, open repair of recurrent incisional hernia, who is admitted with wound wound discharge and pain. Pharmacy is consulted to dose vancomycin. Ht Readings from Last 1 Encounters:   11/15/22 5' 7.5\" (1.715 m)     Wt Readings from Last 1 Encounters:   11/17/22 277 lb 5.4 oz (125.8 kg)     Current & Prior Antimicrobial Regimen(s):  Cefepime (11/14-current)  Metronidazole (11/15-current)  Vancomycin - Pharmacy to dose   11/14  1250mg IV q12h (11/15-11/16)  1500mg IV q12h (11/16-current)    Vancomycin Level(s) / Doses:    Date Time Dose Type of Level / Level Interpretation   11/16 0348 1250mg IV q12h Random = 15.9 mg/L Level drawn ~6h after prior dose  Calculated AUC = 377 mg/L*h with trough of 8.7 mg/L  Increase dose to 1500mg IV q12h          Note: Serum levels collected for AUC-based dosing may be high if collected in close proximity to the dose administered.  This is not necessarily indicative of toxicity. Cultures & Sensitivities:    Date Site Micro Susceptibility / Result   11/15 Abdominal aspirate 2+ WBC, 1+ GPC            Recent Labs     11/15/22  0506 11/16/22  0346 11/16/22  0348 11/17/22  0438   CREATININE 0.6  --  0.6 0.6   BUN 13  --  8 10   WBC 5.9 4.8  --  4.4         Estimated Creatinine Clearance: 145 mL/min (based on SCr of 0.6 mg/dL). Additional Lab Values / Findings of Note:    No results for input(s): PROCAL in the last 72 hours.

## 2022-11-18 ENCOUNTER — CARE COORDINATION (OUTPATIENT)
Dept: CASE MANAGEMENT | Age: 57
End: 2022-11-18

## 2022-11-18 NOTE — CARE COORDINATION
[facility] Transitions Initial Follow Up Call    Call within 2 business days of discharge: Yes    Patient:  Amy Florez   Patient :  1965  MRN:  [id number]      Reason for Admission: Cellulitis   Discharge Date:  22     RARS:       Transitions of Care Initial Call    Was this an external facility discharge? Discharge Facility: [address] [place] [address] to be reviewed by the provider   Additional needs identified to be addressed with provider:    shlomo    AMB CC Provider Discharge Needs: none            Non-face-to-face services provided:  Communication with home health agencies or other community services the patient is currently using-CTN confirmed with intake department with [facility], HHC orders were received. 1ST CTC attempt to reach Pt regarding recent hospital discharge. CTC left voice recording with call back number requesting a call back.     Follow up appointments:    Future Appointments   Date Time Provider Thony Ron   2022  5:00 PM MD S[facility]MA [facility] - [facility]   2023 11:00 AM MD SELMA [facility] [place] - [facility]       Thank You,    Patt Gresham RN  Care Transition Coordinator  Contact TYAD:[phone]

## 2022-11-18 NOTE — CARE COORDINATION
[facility] Care Transitions Initial Follow Up Call    Call within 2 business days of discharge: Yes    Care Transition Nurse contacted the patient by telephone to perform post hospital discharge assessment. Verified name and  with patient as identifiers. Provided introduction to self, and explanation of the Care Transition Nurse role. Patient: Nara Leo Patient : 1965   MRN: [id number]    Reason for Admission: Cellulitis, S/P Hernia Repair  Discharge Date: 22  RARS: Readmission Risk Score: 11.3      Last Discharge  [address]       Date Complaint Diagnosis Description Type Department Provider    22 Rash Surgical wound infection ED to Hosp-Admission (Discharged) (ADMITTED) [facility] 4 PCU Leandra Cunningham MD; Hipolito Engle. .. Was this an external facility discharge? No     Challenges to be reviewed by the provider   Additional needs identified to be addressed with provider: No  none               Method of communication with provider: none. CTN spoke with patient this am for initial 24 hour discharge follow up CTN call. Patient states she is doing well, states she has some slight soreness. Patient denies having any fever, chills, nausea, vomiting, chest pain, SOB or cough. Patient with no congestion, pain, difficulty emptying bladder, LE edema, feeling lightheaded, dizziness, and heart palpitations. Patient states Drain is draining without any difficulty, patient states she has not heard from SN with [facility] yet. CTN and Pt reviewed meds and CTN completed the 1111f. CTN did contact [facility], confirmed with intake department with [facility], referral was received. CTN received message from patient stating she contacted [facility], was told she was not in there system. CTN contacted [facility] intake again, confirmed patient is in fact in there system, they will have  contact patient to let her know when SN will be in home.       Care Transition Nurse reviewed discharge instructions, medical action plan, and red flags with patient who verbalized understanding. The patient was given an opportunity to ask questions and does not have any further questions or concerns at this time. Were discharge instructions available to patient? Yes. Reviewed appropriate site of care based on symptoms and resources available to patient including: PCP  Specialist  Urgent care clinics  [facility]  When to call 911. The patient agrees to contact the PCP office for questions related to their healthcare. Advance Care Planning:   Does patient have an Advance Directive: reviewed and current. Medication reconciliation was performed with patient, who verbalizes understanding of administration of home medications. Medications reviewed, 1111F entered: yes    Was patient discharged with a pulse oximeter? no    Non-face-to-face services provided:  Obtained and reviewed discharge summary and/or continuity of care documents  Communication with home health agencies or other community services the patient is currently using-CTN confirmed with intake department with [facility], referral was received. Education of patient/family/caregiver/guardian to support self-management-Patient instructed to continue to monitor for any of the above noted s/s, continue to monitor drain for any differences in drainage color, odor or increase or decrease in drainage, reporting to MD immediately. Assessment and support for treatment adherence and medication management-Medication Review Completed with patient.     Offered patient enrollment in the Remote Patient Monitoring (RPM) program for in-home monitoring: Patient is not eligible for RPM program.    Care Transitions 24 Hour Call    Schedule Follow Up Appointment with PCP: Declined  Do you have a copy of your discharge instructions?: Yes  Do you have all of your prescriptions and are they filled?: Yes  Have you been contacted by a [address]?: No  Have you scheduled your follow up appointment?: Yes  How are you going to get to your appointment?: Car - family or friend to transport  Do you have support at home?: Partner/Spouse/SO  Do you feel like you have everything you need to keep you well at home?: Yes  Are you an active caregiver in your home?: No  Care Transitions Interventions  No Identified Needs         Follow Up  Future Appointments   Date Time Provider Thony Ron   11/22/2022  5:00 PM [facility]   1/25/2023 11:00 AM Sonia Lopez MD [facility] Transition Nurse provided contact information. Plan for follow-up call in 5-7 days based on severity of symptoms and risk factors. Plan for next call: symptom management-Any worsening pain or soreness, signs of infection at incision site, drain problems, [address] agency has been in home.       Thank Mercedes Lopez RN  Care Transition Coordinator  Contact CNJATL:[phone]

## 2022-11-19 LAB
BODY FLUID CULTURE, STERILE: ABNORMAL
GRAM STAIN RESULT: ABNORMAL
ORGANISM: ABNORMAL

## 2022-11-19 NOTE — PROGRESS NOTES
Physician Progress Note      PATIENT:               Jacquelyn Serna  CSN #:                  [id number]  :                       1965  ADMIT DATE:       2022 6:03 PM  [address] DATE:        2022 7:40 PM  RESPONDING  PROVIDER #:        Fred Hermosillo TEXT:    Pt admitted  with  pruritic rash around incision & \" fluid collection\" of   abd wall with subcutaneous stranding. Noted documentation of Cellulitis by   ED physician. If possible, please document in progress notes and discharge   summary:    The medical record reflects the following:  Risk Factors: Recent hernia repair  Clinical Indicators: Per ED \"diffuse erythema concerning for cellulitis along   the intra-abdominal wound \". Per H&P \" pruritic rash that started at her   incision sites and has progressively spread to her groin, thighs and pannus\"  Treatment: Cefe, Flagyl, Vanc, CT, IR abd wall drain placement  Options provided:  -- Cellulitis of abdomen, groin and thighs, related to recent hernia repair,   present on admission  -- Cellulitis of abdomen, groin and thighs, POA and unrelated to recent hernia   repair  -- Cellulitis ruled out  -- Other - I will add my own diagnosis  -- Disagree - Not applicable / Not valid  -- Disagree - Clinically unable to determine / Unknown  -- Refer to Clinical Documentation Reviewer    PROVIDER RESPONSE TEXT:    Cellulitis ruled out after study    Query created by: Erwin Ramachandran on 2022 1:44 PM      QUERY TEXT:    Pt admitted  with  pruritic rash around incision & \" fluid collection\" of   abd wall with subcutaneous stranding. IR notes \" appears to be an abdominal   wall hematoma\". If possible, please document in progress notes and discharge   summary further specificity regarding fluid collection:     The medical record reflects the following:  Risk Factors: Recent hernia repair  Clinical Indicators: CT: hyperdense fluid collection mainly to the left of   midline but also small area extending to the right of midline, most likely   hematoma although could be infected\". Per IR Drain placement 11/15    \"aspiration of 20 mL of bloody fluid; This appears to be an abdominal wall   hematoma. A diagnostic sample was sent to the lab\". 11/15 CX: No growth to   date. 2+ WBC, 1+ GPC. Treatment: Cefe, Flagyl, Vanc, CT, IR abd wall drain placement  Options provided:  -- Fluid collection consistent with Abdominal wall hematoma  -- Fluid collection consistent with Abdominal wall abscess, related to recent   hernia repair  -- Fluid collection consistent with Abdominal wall abscess, unrelated to   recent hernia repair  -- Other - I will add my own diagnosis  -- Disagree - Not applicable / Not valid  -- Disagree - Clinically unable to determine / Unknown  -- Refer to Clinical Documentation Reviewer    PROVIDER RESPONSE TEXT:    Fluid collection consistent with Abdominal wall hematoma    Query created by:  Bill Madrigal on 11/17/2022 1:52 PM      Electronically signed by:  Mauro Angelo 11/19/2022 7:22 AM

## 2022-11-21 ENCOUNTER — OFFICE VISIT (OUTPATIENT)
Dept: SURGERY | Age: 57
End: 2022-11-21

## 2022-11-21 VITALS
HEART RATE: 97 BPM | SYSTOLIC BLOOD PRESSURE: 144 MMHG | DIASTOLIC BLOOD PRESSURE: 90 MMHG | BODY MASS INDEX: 43.32 KG/M2 | WEIGHT: 276 LBS | HEIGHT: 67 IN

## 2022-11-21 DIAGNOSIS — S30.1XXD ABDOMINAL WALL HEMATOMA, SUBSEQUENT ENCOUNTER: Primary | ICD-10-CM

## 2022-11-21 DIAGNOSIS — Z09 POSTOP CHECK: ICD-10-CM

## 2022-11-21 PROCEDURE — 99024 POSTOP FOLLOW-UP VISIT: CPT | Performed by: SURGERY

## 2022-11-21 NOTE — PROGRESS NOTES
PATIENT NAME: Ester Loyola     YOB: 1965     TODAY'S DATE: 10/18/2019    Reason for Visit:  Post-op check    Requesting Physician:  Dr. Johnson Devoid:              The patient is a 65 y.o. female with a PMHx as delineated below who presents for follow up. She developed a groin and thigh rash postoperatively as well as a SQ hematoma. A drain was placed. No complaints since her discharge.      Chief Complaint   Patient presents with    Post-Op Check     Open repair of recurrent incisional hernia 11/3 & open abdominal wall washout 11/15/22       REVIEW OF SYSTEMS:  CONSTITUTIONAL:  negative  HEENT:  negative  RESPIRATORY:  negative  CARDIOVASCULAR:  negative  GASTROINTESTINAL:  negative except for abdominal pain  GENITOURINARY:  negative  HEMATOLOGIC/LYMPHATIC:  negative  NEUROLOGICAL:  negative    PMH  Past Medical History:   Diagnosis Date    Arrhythmia     pt reports intermittent arrhythmias -- Dr. Edgar Mc is cardiologist, doesn't take meds    CAD (coronary artery disease)     Cataplexy     Essential hypertension 09/15/2017    High blood pressure     Migraine     Narcolepsy     takes naps to prevent    Pneumonia     june 2022    Prediabetes     Presence of upper and lower permanent dental bridges     Recurrent incisional hernia     Unspecified sleep apnea     NO CPAP    Varicose veins of lower extremities 10/09/2011       PSH  Past Surgical History:   Procedure Laterality Date    COLONOSCOPY      ENDOMETRIAL ABLATION  06/2009    TUBAL LIGATION      tubes tied 13 yrs ago -- [address] N/A 12/3/2019    OPEN INCISIONAL HERNIA REPAIR WITH UMBILECTOMY performed by Mateo Lima MD at [address] [facility] N/A 11/3/2022    OPEN REPAIR OF RECURRENT INCISIONAL HERNIA performed by Mateo Lima MD at [address] History     Socioeconomic History    Marital status:      Spouse name: Not on file    Number of children: Not on file    Years of education: Not on file    Highest education level: Not on file   Occupational History    Not on file   Tobacco Use    Smoking status: Never    Smokeless tobacco: Never   Vaping Use    Vaping Use: Never used   Substance and Sexual Activity    Alcohol use: No     Alcohol/week: 0.0 standard drinks    Drug use: Yes     Types: Opiates      Comment: Pain meds from surgery    Sexual activity: Yes     Partners: Male   Other Topics Concern    Not on file   Social History Narrative    Not on file     Social Determinants of Health     Financial Resource Strain: Low Risk     Difficulty of Paying Living Expenses: Not hard at all   Food Insecurity: No Food Insecurity    Worried About Running Out of Food in the Last Year: Never true    Ran Out of Food in the Last Year: Never true   Transportation Needs: Not on file   Physical Activity: Not on file   Stress: Not on file   Social Connections: Not on file   Intimate Partner Violence: Not on file   Housing Stability: Not on file       Allergy: No Known Allergies    PHYSICAL EXAM:  VITALS:  BP (!) 144/90   Pulse 97   Ht 5' 7\" (1.702 m)   Wt 276 lb (125.2 kg)   BMI 43.23 kg/m²     CONSTITUTIONAL:  alert, no apparent distress and moderately obese  EYES:  sclera clear  ENT:  normocepalic, without obvious abnormality  NECK:  supple, symmetrical, trachea midline and no carotid bruits  LUNGS:  clear to auscultation  CARDIOVASCULAR:  regular rate and rhythm and no murmur noted  ABDOMEN: Midline incision is healing well without erythema, hematoma noted not changed in size, normal bowel sounds, soft, non-distended, non-tender, voluntary guarding absent, no masses palpated and hernia present at the umbilicus  MUSCULOSKELETAL:  0+ pitting edema lower extremities  NEUROLOGIC:  Mental Status Exam:  Level of Alertness:   awake  Orientation:   person, place, time  SKIN:  erythema over the groin and upper thighs is almost fully resolved. IMPRESSION/RECOMMENDATIONS:    The patient is s/p recurrent incisional hernia repair complicated by postoperative hematoma. The drain placed in the hospitals is not draining much. We will proceed with a washout in the OR. The risks and benefits were discussed and she wishes to proceed.      Mateo Lima MD

## 2022-11-21 NOTE — LETTER
P - [facility]  [address]  [address] [facility]. [address]  Phone: [phone] Fax: [phone]            Surgery Order/Time:                 Conf. # _________________  Scheduled by: Patricia Osborne Lpn                                **Pre-Surgical Orders in [facility]**  [place] [facility] [place]    Surgery Date: 2022Time: 1200pm    Pt arrival: 1000  Second Surgeon: N/A        Patient Name: Dannie Ware  : 1965  Home Ph:[phone] (home)  [facility]:[address] [address]  PCP:  Tico Taylor MD   Does patient speak English?: yes    needed? no                                             PROCEDURE: Washout of abdominal wall hematoma  CPT: 14198  DIAGNOSIS:      ICD-10-CM    1. Abdominal wall hematoma, subsequent encounter  S30. 1XXD           Anesthesia: MAC  Time Needed:  30 minutes   Pt Position:  supine      Outpatient __x__ Admit ______  Assist._____   Cardiac Clearance: no  Patient to meet with Anesthesiology prior to surgery: no    Patient Allergies: No Known Allergies  Pre-Op H&P to be done by: Tico Taylor MD  Pre-Op Antibiotics: Ancef: 2g IV On Call to OR      Physician: Angelica Ruiz MD Date: 22             Insurance:     ID #      Ph #   Date called ________________   Donia Gal to: _____________ Precert Needed?  Yes  /  No  PreAuth # & Details   ______________________________________________________________________

## 2022-11-22 ENCOUNTER — ANESTHESIA EVENT (OUTPATIENT)
Dept: OPERATING ROOM | Age: 57
End: 2022-11-22
Payer: COMMERCIAL

## 2022-11-22 NOTE — PROGRESS NOTES
Place patient label inside box (if no patient label, complete below)  Name:  :  MR#:   Gena Ramos / PROCEDURE  I (we), Everardo Otero (Patient Name) authorize DR. Davdi Valencia (Provider / Lorenza Kawasaki) and/or such assistants as may be selected by him/her, to perform the following operation/procedure(s): WASHOUT OF ABDOMINAL WALL HEMATOMA       Note: If unable to obtain consent prior to an emergent procedure, document the emergent reason in the medical record. This procedure has been explained to my (our) satisfaction and included in the explanation was: The intended benefit, nature, and extent of the procedure to be performed; The significant risks involved and the probability of success; Alternative procedures and methods of treatment; The dangers and probable consequences of such alternatives (including no procedure or treatment); The expected consequences of the procedure on my future health; Whether other qualified individuals would be performing important surgical tasks and/or whether  would be present to advise or support the procedure. I (we) understand that there are other risks of infection and other serious complications in the pre-operative/procedural and postoperative/procedural stages of my (our) care. I (we) have asked all of the questions which I (we) thought were important in deciding whether or not to undergo treatment or diagnosis. These questions have been answered to my (our) satisfaction. I (we) understand that no assurance can be given that the procedure will be a success, and no guarantee or warranty of success has been given to me (us). It has been explained to me (us) that during the course of the operation/procedure, unforeseen conditions may be revealed that necessitate extension of the original procedure(s) or different procedure(s) than those set forth in Paragraph 1.  I (we) authorize and request that the above-named physician, his/her assistants or his/her designees, perform procedures as necessary and desirable if deemed to be in my (our) best interest.     Revised 8/2/2021                                                                          Page 1 of 2       I acknowledge that health care personnel may be observing this procedure for the purpose of medical education or other specified purposes as may be necessary as requested and/or approved by my (our) physician. I (we) consent to the disposal by the hospital Pathologist of the removed tissue, parts or organs in accordance with hospital policy. I do ____ do not ____ consent to the use of a local infiltration pain blocking agent that will be used by my provider/surgical provider to help alleviate pain during my procedure. I do ____ do not ____ consent to an emergent blood transfusion in the case of a life-threatening situation that requires blood components to be administered. This consent is valid for 24 hours from the beginning of the procedure. This patient does ____ or does not ____ currently have a DNR status/order. If DNR order is in place, obtain Addendum to the Surgical Consent for ALL Patients with a DNR Order to address rachel-operative status for limited intervention or DNR suspension.      I have read and fully understand the above Consent for Operation/Procedure and that all blanks were completed before I signed the consent.   _____________________________       _____________________      ____/____am/pm  Signature of Patient or legal representative      Printed Name / Relationship            Date / Time   ____________________________       _____________________      ____/____am/pm  Witness to Signature                                    Printed Name                    Date / Time    If patient is unable to sign or is a minor, complete the following)  Patient is a minor, ____ years of age, or unable to sign because: ______________________________________________________________________________________________    If a phone consent is obtained, consent will be documented by using two health care professionals, each affirming that the consenting party has no questions and gives consent for the procedure discussed with the physician/provider.   _____________________          ____________________       _____/_____am/pm   2nd witness to phone consent        Printed name           Date / Time    Informed Consent:  I have provided the explanation described above in section 1 to the patient and/or legal representative.  I have provided the patient and/or legal representative with an opportunity to ask any questions about the proposed operation/procedure.   ___________________________          ____________________         ____/____am/pm  Provider / Proceduralist                            Printed name            Date / Time  Revised 8/2/2021                                                                      Page 2 of 2

## 2022-11-22 NOTE — PROGRESS NOTES
[facility] PRE-SURGICAL TESTING INSTRUCTIONS                      PRIOR TO PROCEDURE DATE:    1. PLEASE FOLLOW ANY INSTRUCTIONS GIVEN TO YOU PER YOUR SURGEON. 2. Arrange for someone to drive you home and be with you for the first 24 hours after discharge for your safety after your procedure for which you received sedation. Ensure it is someone we can share information with regarding your discharge. NOTE: At this time ONLY 2 ADULTS may accompany you   One person MUST stay at hospital entire time if outpatient surgery      3. You must contact your surgeon for instructions IF:  You are taking any blood thinners, aspirin, anti-inflammatory or vitamins. There is a change in your physical condition such as a cold, fever, rash, cuts, sores, or any other infection, especially near your surgical site. 4. Do not drink alcohol the day before or day of your procedure. Do not use any recreational marijuana at least 24 hours or street drugs (heroin, cocaine) at minimum 5 days prior to your procedure. 5. A Pre-Surgical History and Physical MUST be completed WITHIN 30 DAYS OR LESS prior to your procedure. by your Physician or an Urgent Care        THE DAY OF YOUR PROCEDURE:  1. Follow instructions for ARRIVAL TIME as DIRECTED BY YOUR SURGEON. 2. Enter the MAIN entrance from [address] and follow the signs to the free Parking [employer] (offered free of charge 7 am-5pm). 3. Enter the Main Entrance of the hospital (do not enter from the lower level of the parking garage). Upon entrance, check in with the  at the surgical information desk on your LEFT. Bring your insurance card and photo ID to register      4. DO NOT EAT ANYTHING 8 hours prior to arrival for surgery. You may have up to 8 ounces of water 4 hours prior to your arrival for surgery.    NOTE: ALL Gastric, Bariatric & Bowel surgery patients - you MUST follow your surgeon's instructions regarding eating/ drinking as you will have very specific instructions to follow. If you did not receive these, call your surgeon's office immediately. 5. MEDICATIONS:  Take the following medications with a SMALL sip of water: none  Use your usual dose of inhalers the morning of surgery. BRING your rescue inhaler with you to hospital.   Anesthesia does NOT want you to take insulin the morning of surgery. They will control your blood sugar while you are at the hospital. Please contact your ordering physician for instructions regarding your insulin the night before your procedure. If you have an insulin pump, please keep it set on basal rate. Bariatric patient's call your surgeon if on diabetic medications as some may need to be stopped 1 week prior to surgery    6. Do not swallow additional water when brushing teeth. No gum, candy, mints, or ice chips. Refrain from smoking or at least decrease the amount on day of surgery. 7. Morning of surgery:   Take a shower with an antibacterial soap (i.e., Safeguard or Dial) OR your physician may have instructed you to use Hibiclens. Dress in loose, comfortable clothing appropriate for redressing after your procedure. Do not wear jewelry (including body piercings), make-up (especially NO eye make-up), fingernail polish (NO toenail polish if foot/leg surgery), lotion, powders, or metal hairclips. Do not shave or wax for 72 hours prior to procedure near your operative site. Shaving with a razor can irritate your skin and make it easier to develop an infection. On the day of your procedure, any hair that needs to be removed near the surgical site will be 'clipped' by a healthcare worker using a special clipper designed to avoid skin irritation. 8. Dentures, glasses, or contacts will need to be removed before your procedure. Bring cases for your glasses, contacts, dentures, or hearing aids to protect them while you are in surgery.       9. If you use a CPAP, please bring it with you on the day of your procedure. 10. We recommend that valuable personal belongings such as cash, cell phones, e-tablets, or jewelry, be left at home during your stay. The hospital will not be responsible for valuables that are not secured in the hospital safe. However, if your insurance requires a co-pay, you may want to bring a method of payment, i.e., Check or credit card, if you wish to pay your co-pay the day of surgery. 11. If you are to stay overnight, you may bring a bag with personal items. Please have any large items you may need brought in by your family after your arrival to your hospital room. 12. If you have a Living Will or Durable Power of , please bring a copy on the day of your procedure. How we keep you safe and work to prevent surgical site infections:   1. Health care workers should always check your ID bracelet to verify your name and birth date. You will be asked many times to state your name, date of birth, and allergies. 2. Health care workers should always clean their hands with soap or alcohol gel before providing care to you. It is okay to ask anyone if they cleaned their hands before they touch you. 3. You will be actively involved in verifying the type of procedure you are having and ensuring the correct surgical site. This will be confirmed multiple times prior to your procedure. Do NOT cheikh your surgery site UNLESS instructed to by your surgeon. 4. When you are in the operating room, your surgical site will be cleansed with a special soap, and in most cases, you will be given an antibiotic before the surgery begins. What to expect AFTER your procedure? 1. Immediately following your procedure, your will be taken to the PACU for the first phase of your recovery. Your nurse will help you recover from any potential side effects of anesthesia, such as extreme drowsiness, changes in your vital signs or breathing patterns.  Nausea, headache, muscle aches, or sore throat may also occur after anesthesia. Your nurse will help you manage these potential side effects. 2. For comfort and safety, arrange to have someone at home with you for the first 24 hours after discharge. 3. You and your family will be given written instructions about your diet, activity, dressing care, medications, and return visits. 4. Once at home, should issues with nausea, pain, or bleeding occur, or should you notice any signs of infection, you should call your surgeon. 5. Always clean your hands before and after caring for your wound. Do not let your family touch your surgery site without cleaning their hands. 6. Narcotic pain medications can cause significant constipation. You may want to add a stool softener to your postoperative medication schedule or speak to your surgeon on how best to manage this SIDE EFFECT. Thank you for allowing us to care for you. We strive to exceed your expectations in the delivery of care and service provided to you and your family. If you need to contact the [place] [address] staff for any reason, please call us at [phone]    Instructions reviewed with patient during preadmission testing phone interview.   Olayinka Hawkins RN.11/22/2022 .9:26 AM      ADDITIONAL EDUCATIONAL INFORMATION REVIEWED PER PHONE WITH YOU AND/OR YOUR FAMILY:  No Hibiclens® Bathing Instructions   Yes Antibacterial Soap

## 2022-11-23 ENCOUNTER — ANESTHESIA (OUTPATIENT)
Dept: OPERATING ROOM | Age: 57
End: 2022-11-23
Payer: COMMERCIAL

## 2022-11-23 ENCOUNTER — HOSPITAL ENCOUNTER (OUTPATIENT)
Age: 57
Setting detail: OUTPATIENT SURGERY
Discharge: HOME | End: 2022-11-23
Attending: SURGERY | Admitting: SURGERY
Payer: COMMERCIAL

## 2022-11-23 VITALS
HEART RATE: 72 BPM | DIASTOLIC BLOOD PRESSURE: 85 MMHG | BODY MASS INDEX: 41.68 KG/M2 | HEIGHT: 68 IN | TEMPERATURE: 97.2 F | WEIGHT: 275 LBS | RESPIRATION RATE: 15 BRPM | OXYGEN SATURATION: 97 % | SYSTOLIC BLOOD PRESSURE: 120 MMHG

## 2022-11-23 DIAGNOSIS — S30.1XXD ABDOMINAL WALL HEMATOMA, SUBSEQUENT ENCOUNTER: ICD-10-CM

## 2022-11-23 PROCEDURE — 7100000010 HC PHASE II RECOVERY - FIRST 15 MIN: Performed by: SURGERY

## 2022-11-23 PROCEDURE — 3700000001 HC ADD 15 MINUTES (ANESTHESIA): Performed by: SURGERY

## 2022-11-23 PROCEDURE — 7100000011 HC PHASE II RECOVERY - ADDTL 15 MIN: Performed by: SURGERY

## 2022-11-23 PROCEDURE — 2580000003 HC RX 258: Performed by: FAMILY MEDICINE

## 2022-11-23 PROCEDURE — 2709999900 HC NON-CHARGEABLE SUPPLY: Performed by: SURGERY

## 2022-11-23 PROCEDURE — 3600000004 HC SURGERY LEVEL 4 BASE: Performed by: SURGERY

## 2022-11-23 PROCEDURE — 2500000003 HC RX 250 WO HCPCS: Performed by: SURGERY

## 2022-11-23 PROCEDURE — 2580000003 HC RX 258: Performed by: SURGERY

## 2022-11-23 PROCEDURE — 6360000002 HC RX W HCPCS: Performed by: NURSE ANESTHETIST, CERTIFIED REGISTERED

## 2022-11-23 PROCEDURE — 3600000014 HC SURGERY LEVEL 4 ADDTL 15MIN: Performed by: SURGERY

## 2022-11-23 PROCEDURE — 87206 SMEAR FLUORESCENT/ACID STAI: CPT

## 2022-11-23 PROCEDURE — 6360000002 HC RX W HCPCS: Performed by: SURGERY

## 2022-11-23 PROCEDURE — 7100000001 HC PACU RECOVERY - ADDTL 15 MIN: Performed by: SURGERY

## 2022-11-23 PROCEDURE — A4217 STERILE WATER/SALINE, 500 ML: HCPCS | Performed by: SURGERY

## 2022-11-23 PROCEDURE — 87116 MYCOBACTERIA CULTURE: CPT

## 2022-11-23 PROCEDURE — 7100000000 HC PACU RECOVERY - FIRST 15 MIN: Performed by: SURGERY

## 2022-11-23 PROCEDURE — 3700000000 HC ANESTHESIA ATTENDED CARE: Performed by: SURGERY

## 2022-11-23 PROCEDURE — 87070 CULTURE OTHR SPECIMN AEROBIC: CPT

## 2022-11-23 PROCEDURE — 87205 SMEAR GRAM STAIN: CPT

## 2022-11-23 RX ORDER — OXYCODONE HYDROCHLORIDE 5 MG/1
5 TABLET ORAL PRN
Status: DISCONTINUED | OUTPATIENT
Start: 2022-11-23 | End: 2022-11-23 | Stop reason: HOSPADM

## 2022-11-23 RX ORDER — MEPERIDINE HYDROCHLORIDE 25 MG/ML
12.5 INJECTION INTRAMUSCULAR; INTRAVENOUS; SUBCUTANEOUS EVERY 5 MIN PRN
Status: DISCONTINUED | OUTPATIENT
Start: 2022-11-23 | End: 2022-11-23 | Stop reason: HOSPADM

## 2022-11-23 RX ORDER — SODIUM CHLORIDE, SODIUM LACTATE, POTASSIUM CHLORIDE, CALCIUM CHLORIDE 600; 310; 30; 20 MG/100ML; MG/100ML; MG/100ML; MG/100ML
INJECTION, SOLUTION INTRAVENOUS CONTINUOUS
Status: DISCONTINUED | OUTPATIENT
Start: 2022-11-23 | End: 2022-11-23 | Stop reason: HOSPADM

## 2022-11-23 RX ORDER — SODIUM CHLORIDE 9 MG/ML
INJECTION, SOLUTION INTRAVENOUS PRN
Status: DISCONTINUED | OUTPATIENT
Start: 2022-11-23 | End: 2022-11-23 | Stop reason: HOSPADM

## 2022-11-23 RX ORDER — LABETALOL HYDROCHLORIDE 5 MG/ML
10 INJECTION, SOLUTION INTRAVENOUS
Status: DISCONTINUED | OUTPATIENT
Start: 2022-11-23 | End: 2022-11-23 | Stop reason: HOSPADM

## 2022-11-23 RX ORDER — SODIUM CHLORIDE 0.9 % (FLUSH) 0.9 %
5-40 SYRINGE (ML) INJECTION PRN
Status: DISCONTINUED | OUTPATIENT
Start: 2022-11-23 | End: 2022-11-23 | Stop reason: HOSPADM

## 2022-11-23 RX ORDER — LIDOCAINE HYDROCHLORIDE 20 MG/ML
INJECTION, SOLUTION INTRAVENOUS PRN
Status: DISCONTINUED | OUTPATIENT
Start: 2022-11-23 | End: 2022-11-23 | Stop reason: SDUPTHER

## 2022-11-23 RX ORDER — OXYCODONE HYDROCHLORIDE 5 MG/1
5 TABLET ORAL EVERY 6 HOURS PRN
Qty: 12 TABLET | Refills: 0 | Status: SHIPPED | OUTPATIENT
Start: 2022-11-23 | End: 2022-11-26

## 2022-11-23 RX ORDER — HYDRALAZINE HYDROCHLORIDE 20 MG/ML
10 INJECTION INTRAMUSCULAR; INTRAVENOUS
Status: DISCONTINUED | OUTPATIENT
Start: 2022-11-23 | End: 2022-11-23 | Stop reason: HOSPADM

## 2022-11-23 RX ORDER — MIDAZOLAM HYDROCHLORIDE 1 MG/ML
INJECTION INTRAMUSCULAR; INTRAVENOUS PRN
Status: DISCONTINUED | OUTPATIENT
Start: 2022-11-23 | End: 2022-11-23 | Stop reason: SDUPTHER

## 2022-11-23 RX ORDER — METOCLOPRAMIDE HYDROCHLORIDE 5 MG/ML
10 INJECTION INTRAMUSCULAR; INTRAVENOUS
Status: DISCONTINUED | OUTPATIENT
Start: 2022-11-23 | End: 2022-11-23 | Stop reason: HOSPADM

## 2022-11-23 RX ORDER — OXYCODONE HYDROCHLORIDE 5 MG/1
10 TABLET ORAL PRN
Status: DISCONTINUED | OUTPATIENT
Start: 2022-11-23 | End: 2022-11-23 | Stop reason: HOSPADM

## 2022-11-23 RX ORDER — SODIUM CHLORIDE 0.9 % (FLUSH) 0.9 %
5-40 SYRINGE (ML) INJECTION EVERY 12 HOURS SCHEDULED
Status: DISCONTINUED | OUTPATIENT
Start: 2022-11-23 | End: 2022-11-23 | Stop reason: HOSPADM

## 2022-11-23 RX ORDER — DIPHENHYDRAMINE HYDROCHLORIDE 50 MG/ML
12.5 INJECTION INTRAMUSCULAR; INTRAVENOUS
Status: DISCONTINUED | OUTPATIENT
Start: 2022-11-23 | End: 2022-11-23 | Stop reason: HOSPADM

## 2022-11-23 RX ORDER — SODIUM CHLORIDE 9 MG/ML
25 INJECTION, SOLUTION INTRAVENOUS PRN
Status: DISCONTINUED | OUTPATIENT
Start: 2022-11-23 | End: 2022-11-23 | Stop reason: HOSPADM

## 2022-11-23 RX ORDER — MAGNESIUM HYDROXIDE 1200 MG/15ML
LIQUID ORAL CONTINUOUS PRN
Status: COMPLETED | OUTPATIENT
Start: 2022-11-23 | End: 2022-11-23

## 2022-11-23 RX ORDER — FENTANYL CITRATE 50 UG/ML
INJECTION, SOLUTION INTRAMUSCULAR; INTRAVENOUS PRN
Status: DISCONTINUED | OUTPATIENT
Start: 2022-11-23 | End: 2022-11-23 | Stop reason: SDUPTHER

## 2022-11-23 RX ORDER — BUPIVACAINE HYDROCHLORIDE 5 MG/ML
INJECTION, SOLUTION EPIDURAL; INTRACAUDAL PRN
Status: DISCONTINUED | OUTPATIENT
Start: 2022-11-23 | End: 2022-11-23 | Stop reason: HOSPADM

## 2022-11-23 RX ORDER — PROPOFOL 10 MG/ML
INJECTION, EMULSION INTRAVENOUS CONTINUOUS PRN
Status: DISCONTINUED | OUTPATIENT
Start: 2022-11-23 | End: 2022-11-23 | Stop reason: SDUPTHER

## 2022-11-23 RX ORDER — DOXYCYCLINE 100 MG/1
100 TABLET ORAL 2 TIMES DAILY
Qty: 14 TABLET | Refills: 0 | Status: SHIPPED | OUTPATIENT
Start: 2022-11-23 | End: 2022-11-30

## 2022-11-23 RX ADMIN — SODIUM CHLORIDE, SODIUM LACTATE, POTASSIUM CHLORIDE, AND CALCIUM CHLORIDE: .6; .31; .03; .02 INJECTION, SOLUTION INTRAVENOUS at 11:31

## 2022-11-23 RX ADMIN — MIDAZOLAM HYDROCHLORIDE 2 MG: 2 INJECTION, SOLUTION INTRAMUSCULAR; INTRAVENOUS at 11:31

## 2022-11-23 RX ADMIN — CEFAZOLIN 2000 MG: 2 INJECTION, POWDER, FOR SOLUTION INTRAMUSCULAR; INTRAVENOUS at 11:31

## 2022-11-23 RX ADMIN — LIDOCAINE HYDROCHLORIDE 100 MG: 20 INJECTION, SOLUTION INTRAVENOUS at 11:34

## 2022-11-23 RX ADMIN — FENTANYL CITRATE 50 MCG: 50 INJECTION, SOLUTION INTRAMUSCULAR; INTRAVENOUS at 11:34

## 2022-11-23 RX ADMIN — FENTANYL CITRATE 50 MCG: 50 INJECTION, SOLUTION INTRAMUSCULAR; INTRAVENOUS at 11:32

## 2022-11-23 RX ADMIN — PROPOFOL 100 MCG/KG/MIN: 10 INJECTION, EMULSION INTRAVENOUS at 11:34

## 2022-11-23 ASSESSMENT — PAIN DESCRIPTION - LOCATION: LOCATION: ABDOMEN

## 2022-11-23 ASSESSMENT — PAIN DESCRIPTION - ORIENTATION: ORIENTATION: LEFT

## 2022-11-23 ASSESSMENT — PAIN SCALES - GENERAL
PAINLEVEL_OUTOF10: 2
PAINLEVEL_OUTOF10: 0
PAINLEVEL_OUTOF10: 4

## 2022-11-23 ASSESSMENT — PAIN DESCRIPTION - FREQUENCY: FREQUENCY: INTERMITTENT

## 2022-11-23 ASSESSMENT — PAIN DESCRIPTION - ONSET: ONSET: GRADUAL

## 2022-11-23 ASSESSMENT — PAIN - FUNCTIONAL ASSESSMENT: PAIN_FUNCTIONAL_ASSESSMENT: 0-10

## 2022-11-23 ASSESSMENT — PAIN DESCRIPTION - PAIN TYPE: TYPE: SURGICAL PAIN

## 2022-11-23 ASSESSMENT — PAIN DESCRIPTION - DESCRIPTORS: DESCRIPTORS: OTHER (COMMENT)

## 2022-11-23 NOTE — ANESTHESIA PRE PROCEDURE
Department of Anesthesiology  Preprocedure Note       Name:  Nikita Ludwig   Age:  65 y.o.  :  1965                                          MRN:  [id number]         Date:  2022      Surgeon: Azael Parks):  Tilman Goltz, MD    Procedure: Procedure(s):  WASHOUT OF ABDOMINAL WALL HEMATOMA    Medications prior to admission:   Prior to Admission medications    Medication Sig Start Date End Date Taking? Authorizing Provider   doxycycline monohydrate (ADOXA) 100 MG tablet Take 1 tablet by mouth 2 times daily for 10 days 22  NICOLE Corrales NP   docusate sodium (COLACE) 100 MG capsule Take 1 capsule by mouth 2 times daily for 14 days Please take while taking narcotic pain medicine. If you develop loose or watery stools, then stop taking. 22  NICOLE Corrales NP   Aspirin-Acetaminophen-Caffeine (EXCEDRIN MIGRAINE PO) Take 1 tablet by mouth as needed    Historical Provider, MD   ibuprofen (ADVIL;MOTRIN) 800 MG tablet Take 1 tablet by mouth every 8 hours as needed for Pain 8/21/17 11/3/22  Lakshmi Morrell MD   ascorbic acid (VITAMIN C) 500 MG tablet Take 500 mg by mouth daily. Historical Provider, MD       Current medications:    Current Facility-Administered Medications   Medication Dose Route Frequency Provider Last Rate Last Admin    ceFAZolin (ANCEF) 2,000 mg in sodium chloride 0.9 % 50 mL IVPB (mini-bag)  2,000 mg IntraVENous Once Tilman Goltz, MD        lactated ringers infusion   IntraVENous Continuous Kylee Alcantara MD        sodium chloride flush 0.9 % injection 5-40 mL  5-40 mL IntraVENous 2 times per day Kylee Alcantara MD        sodium chloride flush 0.9 % injection 5-40 mL  5-40 mL IntraVENous PRN Kylee Alcantara MD        0.9 % sodium chloride infusion   IntraVENous PRN Kylee Alcantara MD           Allergies:     Allergies   Allergen Reactions    Adhesive Tape Itching and Other (See Comments)     Redness       Problem List:    Patient Active Problem List   Diagnosis Code    Narcolepsy G47.419    Cataplexy G47. 56    Varicose veins of both lower extremities I83.93    History of migraine Z86.69    Essential hypertension I10    Plantar fasciitis of right foot M72.2    Incisional hernia, without obstruction or gangrene K43.2    Pain and swelling of right knee M25.561, M25.461    Recurrent incisional hernia K43.2    Cellulitis L03.90       Past Medical History:        Diagnosis Date    Arrhythmia     pt reports intermittent arrhythmias -- Dr. Paddy Sánchez is cardiologist, doesn't take meds    CAD (coronary artery disease)     Cataplexy     Essential hypertension 09/15/2017    High blood pressure     Migraine     Narcolepsy     takes naps to prevent    Pneumonia     june 2022    Prediabetes     Presence of upper and lower permanent dental bridges     Recurrent incisional hernia     Unspecified sleep apnea     NO CPAP    Varicose veins of lower extremities 10/09/2011       Past Surgical History:        Procedure Laterality Date    COLONOSCOPY      ENDOMETRIAL ABLATION  06/2009    TUBAL LIGATION      tubes tied 13 yrs ago -- 2001    VENTRAL HERNIA REPAIR N/A 12/3/2019    OPEN INCISIONAL HERNIA REPAIR WITH UMBILECTOMY performed by Kailey Motta MD at [facility] 65 N/A 11/3/2022    OPEN REPAIR OF RECURRENT INCISIONAL HERNIA performed by Kailey Motta MD at [facility] [address]  History:    Social History     Tobacco Use    Smoking status: Never    Smokeless tobacco: Never   Substance Use Topics    Alcohol use: No     Alcohol/week: 0.0 standard drinks                                Counseling given: Not Answered      Vital Signs (Current):   Vitals:    11/22/22 0919 11/23/22 1024   BP:  (!) 129/97   Pulse:  86   Resp:  15   Temp:  97.8 °F (36.6 °C)   TempSrc:  Temporal   SpO2:  98%   Weight: 278 lb (126.1 kg) 275 lb (124.7 kg)   Height: 5' 7.5\" (1.715 m) 5' 7.5\" (1.715 m) BP Readings from Last 3 Encounters:   11/23/22 (!) 129/97   11/21/22 (!) 144/90   11/17/22 113/82       NPO Status:                                                                                 BMI:   Wt Readings from Last 3 Encounters:   11/23/22 275 lb (124.7 kg)   11/21/22 276 lb (125.2 kg)   11/17/22 277 lb 5.4 oz (125.8 kg)     Body mass index is 42.44 kg/m². CBC:   Lab Results   Component Value Date/Time    WBC 4.4 11/17/2022 04:38 AM    RBC 3.21 11/17/2022 04:38 AM    HGB 10.1 11/17/2022 04:38 AM    HCT 29.2 11/17/2022 04:38 AM    MCV 90.9 11/17/2022 04:38 AM    RDW 13.2 11/17/2022 04:38 AM     11/17/2022 04:38 AM       CMP:   Lab Results   Component Value Date/Time     11/17/2022 04:38 AM    K 4.3 11/17/2022 04:38 AM    K 5.6 11/14/2022 07:24 PM     11/17/2022 04:38 AM    CO2 26 11/17/2022 04:38 AM    BUN 10 11/17/2022 04:38 AM    CREATININE 0.6 11/17/2022 04:38 AM    GFRAA >60 03/03/2022 08:12 AM    GFRAA >60 05/06/2011 12:15 PM    AGRATIO 1.6 03/03/2022 08:12 AM    LABGLOM >60 11/17/2022 04:38 AM    GLUCOSE 100 11/17/2022 04:38 AM    PROT 6.5 03/03/2022 08:12 AM    PROT 7.3 05/06/2011 12:15 PM    CALCIUM 9.4 11/17/2022 04:38 AM    BILITOT <0.2 03/03/2022 08:12 AM    ALKPHOS 73 03/03/2022 08:12 AM    AST 11 03/03/2022 08:12 AM    ALT 17 03/03/2022 08:12 AM       POC Tests: No results for input(s): POCGLU, POCNA, POCK, POCCL, POCBUN, POCHEMO, POCHCT in the last 72 hours.     Coags:   Lab Results   Component Value Date/Time    PROTIME 16.9 11/15/2022 05:06 AM    INR 1.38 11/15/2022 05:06 AM       HCG (If Applicable):   Lab Results   Component Value Date    PREGTESTUR Negative 11/03/2022        ABGs: No results found for: PHART, PO2ART, PJT3QMX, NMN0CZA, BEART, V9VKCBKO     Type & Screen (If Applicable):  No results found for: LABABO, LABRH    Drug/Infectious Status (If Applicable):  No results found for: HIV, HEPCAB    COVID-19 Screening (If Applicable): No results found for: COVID19        Anesthesia Evaluation  Patient summary reviewed and Nursing notes reviewed no history of anesthetic complications:   Airway: Mallampati: II  TM distance: >3 FB   Neck ROM: full  Mouth opening: > = 3 FB   Dental:          Pulmonary:   (+) sleep apnea:                             Cardiovascular:    (+) hypertension:, CAD:,                   Neuro/Psych:   (+) headaches: migraine headaches,             GI/Hepatic/Renal: Neg GI/Hepatic/Renal ROS            Endo/Other: Negative Endo/Other ROS                    Abdominal:             Vascular: Other Findings:           Anesthesia Plan      general     ASA 1    (60-year-old female presents for [address] Plan general anesthesia with ASA standard monitors. Questions answered. Patient agreeable with anesthetic plan.  )  Induction: intravenous. Anesthetic plan and risks discussed with patient. Plan discussed with CRNA.     Attending anesthesiologist reviewed and agrees with Jean Duncan MD   11/23/2022

## 2022-11-23 NOTE — H&P
General Surgery  Interval Note:    The patient's History and Physical of 11/21/22 was reviewed with the patient. I examined the patient, and there was no change. The surgical site was confirmed by the patient and me. Plan: The risks, benefits, expected outcome, and alternative(s) to the recommended procedure have been discussed with the patient. The patient understands and wants to proceed with the procedure.       Electronically signed by Carlyle Byrne MD on 11/23/22 at 11:14 AM.

## 2022-11-23 NOTE — ANESTHESIA POSTPROCEDURE EVALUATION
Department of Anesthesiology  Postprocedure Note    Patient: Randolph Mcbride  MRN: [id number]  YOB: 1965  Date of evaluation: 11/23/2022      Procedure Summary     Date: 11/23/22 Room / Location: [address] [place] [address]    Anesthesia Start: 2064 Anesthesia Stop: 3447    Procedure: WASHOUT OF ABDOMINAL WALL HEMATOMA (Abdomen) Diagnosis:       Abdominal wall hematoma, subsequent encounter      (Abdominal wall hematoma, subsequent encounter [S30.1XXD])    Surgeons: Yehuda Bryant MD Responsible Provider: Liang Milan MD    Anesthesia Type: general ASA Status: 3          Anesthesia Type: No value filed.     Garland Phase I: Garland Score: 9    Garland Phase II:        Anesthesia Post Evaluation    Patient location during evaluation: PACU  Patient participation: complete - patient participated  Level of consciousness: awake and alert  Pain score: 0  Airway patency: patent  Nausea & Vomiting: no nausea and no vomiting  Complications: no  Cardiovascular status: hemodynamically stable  Respiratory status: acceptable  Hydration status: euvolemic

## 2022-11-23 NOTE — DISCHARGE SUMMARY
Physician Discharge Summary     Patient ID:  Jovanna Portillo  [id number]  65 y.o.  1965    Admit date: 11/14/2022    Discharge date and time: 11/17/2022  7:40 PM     Admitting Physician: Shaka Mercado MD     Discharge Physician: same    Admission Diagnoses: Cellulitis [L03.90]  Surgical wound infection [T81.49XA]    Discharge Diagnoses: same    Admission Condition: stable    Discharged Condition: stable    Indication for Admission: pruritic rash over recent abd hernia surgery    Hospital Course: Pt was admitted with findings of rash and fluid collections in anterior abd wall. Abx were started and she was initially prepped for possible washout. It was then decided she could benefit from an IR drain. A drain was placed and she was feeling better. She was discharged the following day with close follow up. Discharge Exam:  General appearance: no acute distress, grooming appropriate  Eyes: No scleral icterus, EOM grossly intact  Neck: trachea midline, no JVD, no lymphadenopathy, neck supple  Chest/Lungs: Normal effort with no accessory muscle use on RA  Cardiovascular: RRR, well-perfused  Abdomen: Soft, appropriately-tender, incision c/d/i and well approximated with Prineo. IR drain with dark old blood in bag. Skin: Warm and dry; erythema extending superiorly, laterally and inferiorly to the bilateral thigh from midline incision site. Induration at the midline incision site, improving. No purulent drainage  Extremities: No edema, no cyanosis  Genitourinary: Warm, erythema at bilateral groin and vaginal area  Neuro: A&Ox3, no focal deficits, sensation intact       Disposition: home    In process/preliminary results:  Outstanding Order Results       No orders found from 10/16/2022 to 11/15/2022.             Patient Instructions:   Discharge Medication List as of 11/17/2022  5:35 PM        START taking these medications    Details   doxycycline monohydrate (ADOXA) 100 MG tablet Take 1 tablet by mouth 2 times daily for 10 days, Disp-20 tablet, R-0Print           CONTINUE these medications which have CHANGED    Details   docusate sodium (COLACE) 100 MG capsule Take 1 capsule by mouth 2 times daily for 14 days Please take while taking narcotic pain medicine. If you develop loose or watery stools, then stop taking., Disp-28 capsule, R-0Print           CONTINUE these medications which have NOT CHANGED    Details   Aspirin-Acetaminophen-Caffeine (EXCEDRIN MIGRAINE PO) Take 1 tablet by mouth as neededHistorical Med      ibuprofen (ADVIL;MOTRIN) 800 MG tablet Take 1 tablet by mouth every 8 hours as needed for Pain, Disp-15 tablet, R-0Print      ascorbic acid (VITAMIN C) 500 MG tablet Take 500 mg by mouth daily.            Arleth Mohr, REID  11/23/2022  2:16 PM  marques

## 2022-11-23 NOTE — PROGRESS NOTES
Patient to pacu 11 s/p WASHOUT OF ABDOMINAL WALL HEMATOMA, report received from Dr Lisha Singer and Dr Lee Ann Adames. Reported hemodynamically stable intra op,. All vitals stable upon arrival. LINDA drain intact.

## 2022-11-23 NOTE — PROGRESS NOTES
Pt alert and oriented x4. VSS. Pt walks with steady gait. Pt has some tingling in BLE. Pt has abd wound with drain in place. Pt has scattered bruises on BUE d/t former Ivs and PICC. 20g IV R wrist with LR running to gravity. , Ezio Martines, at bedside. Pt needed updated H&P. Dr. Rachell Benitez made aware.  Dr. Siva Sharma surgery resident to complete H&P.

## 2022-11-23 NOTE — DISCHARGE INSTRUCTIONS
Discharge Instructions:    Diet:   You may resume a regular diet. Wound Care:   Skin glue was used to cover your incision(s). Please note that this glue is tinted purple; therefore, a slight purple hue around your incisions is normal. The skin glue will fall off on its own in about 10 days. You may shower, but do not scrub the incision sites directly or soak (tub, pool, etc.). Strip LINDA drain tubing every 8 hours and record outputs (mL of fluid, color and consistency) in a daily log. Bring this to your follow up appointment. Template for daily log printed below. Continue your antibiotics until your next clinic appointment or until you are told otherwise by your surgical team.    Activity:   No heavy lifting greater than a milk jug (~8lbs) until follow up, at which time you will receive further instruction. Pain management:   Unless informed of any restrictions by your primary care physician, please use your preferred over-the-counter pain reliever as your primary pain medication. If you have pain that persists despite over-the-counter pain medications, you have been provided with a prescription for an opioid/narcotic pain reliever (Oxycodone). No driving or operating machinery while taking opioid/narcotic medications. If you are not taking the opioid pain medication, then you can drive when you feel as though you can sit comfortably behind the steering wheel and can slam on the brake or turn the wheel sharply without it hurting. We recommend that you practice this while sitting the car with it parked in your driveway before trying to drive on the road. Reasons to Return:   Some soreness and redness is common after surgery, especially for the first 24-48 hours.  If, however, you experience for increasing redness, worsening pain, new and/or increasing drainage from wound, fever above 101.5 degrees Farenheit, bleeding that does not stop soon after discovery, or any other concerns about your incision or post op course, please either call the office or call/return to the Emergency Department for further evaluation. Follow up with Dr Marie Padron on 11/28 as previously scheduled. Please call the office to confirm your appointment. [phone]    We recommend that you call your primary care physician within the first 3-5 days following discharge to inform them that you were recently hospitalized and potentially schedule a visit at their discretion. LINDA Drain Log    Date Time Amount                                                                                                                     [facility] AMBULATORY PROCEDURE DISCHARGE INSTRUCTIONS    There are potential side effects of anesthesia or sedation you may experience for the first 24 hours. These side effects include:    Confusion or Memory loss, Dizziness, or Delayed Reaction Times   [x]A responsible person should be with you for the next 24 hours. Do not operate any vehicles (automobiles, bicycles, motorcycles) or power tools or machinery for 24 hours. Do not sign any legal documents or make any legal decisions for 24 hours. Do not drink alcohol for 24 hours or while taking narcotic pain medication. Nausea    [x]Start with light diet and progress to your normal diet as you feel like eating. However, if you experience nausea or repeated episodes of vomiting which persist beyond 12-24 hours, notify your physician. Once nausea has passed, remember to keep drinking fluids. Difficulty Passing Urine  [x]Drink extra amounts of fluid today. Notify your physician if you have not urinated within 8 hours after your procedure or you feel uncomfortable. Irritated Throat from a Breathing Tube  [x]Drink extra amounts of fluid today. Lozenges may help. Muscle Aches  [x]You may experience some generalized body aches as your muscles recover from medications used to relax them during surgery. These will gradually subside.     MEDICATION INSTRUCTIONS:  [x]Prescription(S) x    1 sent with you. Use as directed. When taking pain medications, you may experience the side effect of dizziness or drowsiness. Do not drink alcohol or drive when taking these medications. []Prescription(S) x          Called to Pharmacy Name and location:    [x]Give the list of your medications to your primary care physician on your next visit. Keep your med list updated and carry it with in case of emergencies. [x] Narcotic pain medications can cause the side effect of significant constipation. You may want to add a stool softener to your postoperative medication schedule or speak to your surgeon on how best to manage this side effect. NARCOTIC SAFETY:  Your pain medicine is only for you to take. Safely store your medicines. Store pills up high and out of reach of children and pets. Ensure safety caps are snapped tightly  Keep track of how many pills you have left    Unused medication can be disposed of by taking them to a drop-off box or take-back program that is authorized by the [facility]. Access to a site near you can be found on the [facility] [employer] website ([address] [facility]. [employer]). If you have a CPAP machine, it is very important that you use it daily during all periods of sleep and daytime rest during your recovery at home. Surgery and Anesthesia place a significant amount of stress on your body. Using your CPAP will help keep you safe and lessen the negative effects of that stress. FOLLOW-UP RECOVERY CARE:  [x]Call the office at [phone] follow-up appointment and problems    Watch for these possible complications, symptoms, or side effects of anesthesia. Call physician if they or any other problems occur:  Signs of INFECTION   > Fever over 101°     > Redness, swelling, hardness or warmth at the operative site   >Foul smelling or cloudy drainage at the operative site   Unrelieved PAIN  Unrelieved NAUSEA  Blood soaked dressing. (Some oozing may be normal)  Inability to urinate      Numb, pale, blue, cold or tingling extremity      Physician:  Neal    The above instructions were reviewed with patient/significant other. The following additional patient specific information was reviewed with the patient/significant other:  [x]Procedure/physician specific instructions  []Medication information sheet(S) including potential side effects  []Reneas egress test  []Pain Ball management  []FAQ Catheter associated blood stream infections  []FAQ Surgical Site Infections  []Other-    I have read and understand the instructions given to me: ____________________________________________   (Patient/S.O. Signature)            Date/time 11/23/2022 12:29 PM         PACU:  [phone]   M-F 700 AM - 7 PM      SAME DAY SERVICES:  [phone] M-F 7AM-6PM        If you smoke STOP. We care about your health!

## 2022-11-23 NOTE — BRIEF OP NOTE
Brief Postoperative Note      Patient: Jina Rosa  YOB: 1965  MRN: [id number]    Date of Procedure: 11/23/2022    Pre-Op Diagnosis: Abdominal wall hematoma, subsequent encounter [S30.1XXD]    Post-Op Diagnosis: Same       Procedure(s):  WASHOUT OF ABDOMINAL WALL HEMATOMA    Surgeon(s):  Allen Eden MD    Assistant:  Resident: Olive Zhao MD    Anesthesia: Monitor Anesthesia Care    Estimated Blood Loss (mL): Minimal    Complications: None    Specimens:   ID Type Source Tests Collected by Time Destination   1 : ABDOMINAL WALL HEMATOMA Tissue Tissue CULTURE, TISSUE, CULTURE WITH SMEAR, ACID FAST BACILLIUS Allen Eden MD 11/23/2022 1149        Implants:  * No implants in log *      Drains:   Closed/Suction Drain Lateral;Left LLQ Bulb (Active)   Dressing Status Clean, dry & intact; New dressing applied 11/23/22 1206   Drain Status To bulb suction 11/23/22 1206       [REMOVED] Closed/Suction Drain Medial RLQ Bulb (Removed)   Site Description Clean, dry & intact 11/08/22 1115   Dressing Status Clean, dry & intact 11/08/22 1115   Drainage Appearance Serosanguinous 11/08/22 1115   Drain Status To bulb suction 11/08/22 1115   Output (ml) 15 ml 11/08/22 1439       [REMOVED] Open Drain Medial LUQ (Removed)   Site Description Clean, dry & intact 11/17/22 1220   Dressing Status Clean, dry & intact 11/17/22 1220   Drainage Appearance Dark Red 11/17/22 1220   Output (ml) 10 ml 11/17/22 0812       [REMOVED] External Urinary Catheter (Removed)   Output (mL) 350 mL 11/05/22 1115       Findings: Large amount of hematoma evacuated from subcutaneous tissues; new 15Fr drain placed within hematoma cavity; culture from hematoma sent    Electronically signed by Olive Zhao MD on 11/23/2022 at 12:12 PM

## 2022-11-23 NOTE — PROGRESS NOTES
Physician Progress Note      PATIENT:               Antno Leach  CSN #:                  [id number]  :                       1965  ADMIT DATE:       2022 6:03 PM  [address] [place] [place] DATE:        2022 7:40 PM  RESPONDING  PROVIDER #:        Myna Boeck TEXT:    Pt admitted -  with abdominal wall hematoma, and underwent recent   hernia repair PTA. If possible, please document in the relationship if any   between the abdominal wall hematoma and the surgery: The medical record reflects the following:  Risk Factors: incisional hernia repair with mesh placement and bilateral   posterior component separation on   Clinical Indicators: CT: hematoma in the subcutaneous fat anterior to the   mesentery and rectus sheath. Treatment: IR abd wall drain placement  Options provided:  -- Abdominal wall hematoma?is due to the recent hernia repair  -- Abdominal wall hematoma? is not due to recent hernia repair, but is due to   other incidental risk factor, Please specify other incidental risk factor. -- Other - I will add my own diagnosis  -- Disagree - Not applicable / Not valid  -- Disagree - Clinically unable to determine / Unknown  -- Refer to Clinical Documentation Reviewer    PROVIDER RESPONSE TEXT:    Patient has Abdominal wall hematoma?is due to the recent hernia repair    Query created by:  Nelson Samuel on 2022 10:05 AM      Electronically signed by:  Lala Sen 2022 1:10 PM

## 2022-11-24 PROBLEM — S30.1XXA ABDOMINAL WALL HEMATOMA: Status: ACTIVE | Noted: 2022-11-24

## 2022-11-25 ENCOUNTER — CARE COORDINATION (OUTPATIENT)
Dept: CASE MANAGEMENT | Age: 57
End: 2022-11-25

## 2022-11-25 LAB — AFB SMEAR: NORMAL

## 2022-11-28 ENCOUNTER — OFFICE VISIT (OUTPATIENT)
Dept: SURGERY | Age: 57
End: 2022-11-28

## 2022-11-28 VITALS
DIASTOLIC BLOOD PRESSURE: 87 MMHG | SYSTOLIC BLOOD PRESSURE: 138 MMHG | WEIGHT: 276 LBS | HEART RATE: 93 BPM | BODY MASS INDEX: 42.59 KG/M2

## 2022-11-28 DIAGNOSIS — Z09 POSTOP CHECK: Primary | ICD-10-CM

## 2022-11-28 LAB
ANAEROBIC CULTURE: NORMAL
GRAM STAIN RESULT: NORMAL
WOUND/ABSCESS: NORMAL

## 2022-11-28 PROCEDURE — 99024 POSTOP FOLLOW-UP VISIT: CPT | Performed by: SURGERY

## 2022-11-30 ENCOUNTER — CARE COORDINATION (OUTPATIENT)
Dept: CASE MANAGEMENT | Age: 57
End: 2022-11-30

## 2022-11-30 NOTE — CARE COORDINATION
[facility] Transitions Initial Follow Up Call    Call within 2 business days of discharge: Yes    Patient:  Jaimee Arias   Patient :  1965  MRN:  [id number]      Reason for Admission: S/P Wash Out of ABD wall Hematoma  Discharge Date:  22     RARS:       Transitions of Care Initial Call    Was this an external facility discharge? Discharge Facility: [address] [place] [address] to be reviewed by the provider   Additional needs identified to be addressed with provider:    no    AMB CC Provider Discharge Needs: none            Non-face-to-face services provided:    2ND CTC attempt to reach Pt regarding recent hospital discharge. CTC left voice recording with call back number requesting a call back. CTN will close out CTN episode at this time.     Follow up appointments:    Future Appointments   Date Time Provider Thony Ron   2022  4:30 PM Shaka Mercado MD [facility] SURG [facility]   2023 11:00 AM MD SELMA [facility]nulty[facility] - DYD       Thank You,    Stephanie Woodward RN  Care Transition Coordinator  Contact NOURSM:[phone]

## 2022-12-02 NOTE — OP NOTE
[address] [address]                                OPERATIVE REPORT    PATIENT NAME: González Cadet             :        1965  MED REC NO:   [id number]                          ROOM:  ACCOUNT NO:   [de-identified]                           ADMIT DATE: 2022  PROVIDER:     Alec Contreras MD    DATE OF PROCEDURE:  2022    PREOPERATIVE DIAGNOSIS:  Abdominal wall hematoma. POSTOPERATIVE DIAGNOSIS:  Abdominal wall hematoma. PROCEDURE PERFORMED:  Abdominal wall hematoma washout. SURGEON FOR THE CASE:  Alec Contreras MD    ANESTHESIA:  Local MAC. ESTIMATED BLOOD LOSS:  Minimal.    COMPLICATIONS:  None. SPECIMENS:  None. INDICATIONS FOR THE PROCEDURE:  The patient is a 73-year-old female who  previously underwent a large incisional hernia repair who was doing well  postoperatively and then developed what was confirmed to be a hematoma  on the CT scan and then physical exam.  A drain was placed that did not  resolve this hematoma. As a result of this, a decision was made to  proceed with evacuation of the hematoma. The risks and benefits of the  procedure were discussed with the patient. Consent was obtained. DETAILS OF THE OPERATION:  The patient was brought to the operating room  and placed in supine position. After adequate sedation, the patient was  prepped and draped in a sterile fashion. Mid portion of the incision  was then utilized therefore over the hematoma. From entrance, skin was  then anesthetized with 0.5% Marcaine. An incision was then made using  the #15 blade scalpel. Electrocautery was then used to carry this down  to expose the hematoma pocket. This was then opened. The hematoma was  then evacuated manually and then following evacuation of the hematoma,  the Pulsavac was then utilized to completely clear this space of any  residual blood.   We used approximately 2 L of fluid to do this. The  wounds were then inspected. There was good hemostasis noted throughout  the cavity and the wound. A round channeled 15-Khmer drain was then  brought into the cavity through a separate stab incision, and then this  was secured to the skin using a 2-0 Prolene suture. The wounds were get  inspected. Good hemostasis was noted. The wound was then closed using  interrupted 3-0 Vicryl sutures. The wounds were then cleaned, dried,  and dressed with Prineo. The patient tolerated the procedure well and  was taken to the recovery room in stable condition.         Irena Altamirano MD    D: 12/01/2022 9:58:05       T: 12/01/2022 12:19:14     [id number]  Job#: [id number]     Doc#: [id number]    CC:

## 2022-12-05 ENCOUNTER — OFFICE VISIT (OUTPATIENT)
Dept: SURGERY | Age: 57
End: 2022-12-05

## 2022-12-05 VITALS — SYSTOLIC BLOOD PRESSURE: 132 MMHG | DIASTOLIC BLOOD PRESSURE: 90 MMHG | HEART RATE: 94 BPM

## 2022-12-05 DIAGNOSIS — Z09 POSTOP CHECK: Primary | ICD-10-CM

## 2022-12-05 PROCEDURE — 99024 POSTOP FOLLOW-UP VISIT: CPT | Performed by: SURGERY

## 2022-12-05 NOTE — PROGRESS NOTES
PATIENT NAME: Anastasia Day     YOB: 1965     TODAY'S DATE: 12/5/2022    Reason for Visit:  Post-op    HISTORY OF PRESENT ILLNESS:              The patient is a 65 y.o. female with a PMHx as delineated below who presents to clinic for post-op follow-up. Since her last clinic visit on 11/28, she reports continuing to recover from her surgery without issue. She denies abdominal pain, nausea/vomiting, difficulties voiding/with bowel movements, or increased swelling at her surgical site. No issues following drain removal last week.     REVIEW OF SYSTEMS:  CONSTITUTIONAL:  negative  HEENT:  negative  RESPIRATORY:  negative  CARDIOVASCULAR:  negative  GASTROINTESTINAL:  negative  GENITOURINARY:  negative  HEMATOLOGIC/LYMPHATIC:  negative  MUSCULOSKELETAL: negative  NEUROLOGICAL:  negative    PMH  Past Medical History:   Diagnosis Date    Arrhythmia     pt reports intermittent arrhythmias -- Dr. Itz Becker is cardiologist, doesn't take meds    Cataplexy     Essential hypertension 09/15/2017    High blood pressure     Migraine     Narcolepsy     takes naps to prevent    Pneumonia     june 2022    Prediabetes     Presence of upper and lower permanent dental bridges     Recurrent incisional hernia     Unspecified sleep apnea     NO CPAP    Varicose veins of lower extremities 10/09/2011       PSH  Past Surgical History:   Procedure Laterality Date    ABDOMEN SURGERY N/A 11/23/2022    WASHOUT OF ABDOMINAL WALL HEMATOMA performed by Marianela Contreras MD at [address]  06/2009    TUBAL LIGATION      tubes tied 13 yrs ago -- [address] N/A 12/3/2019    OPEN INCISIONAL HERNIA REPAIR WITH UMBILECTOMY performed by Marianela Contreras MD at [address] N/A 11/3/2022    OPEN REPAIR OF RECURRENT INCISIONAL HERNIA performed by Marianela Contreras MD at [address] [place] [address] History  Social History     Socioeconomic History    Marital status:      Spouse name: Not on file    Number of children: Not on file    Years of education: Not on file    Highest education level: Not on file   Occupational History    Not on file   Tobacco Use    Smoking status: Never    Smokeless tobacco: Never   Vaping Use    Vaping Use: Never used   Substance and Sexual Activity    Alcohol use: No     Alcohol/week: 0.0 standard drinks    Drug use: Yes     Types: Opiates      Comment: Pain meds from surgery    Sexual activity: Yes     Partners: Male   Other Topics Concern    Not on file   Social History Narrative    Not on file     Social Determinants of Health     Financial Resource Strain: Low Risk     Difficulty of Paying Living Expenses: Not hard at all   Food Insecurity: No Food Insecurity    Worried About Running Out of Food in the Last Year: Never true    Ran Out of Food in the Last Year: Never true   Transportation Needs: Not on file   Physical Activity: Not on file   Stress: Not on file   Social Connections: Not on file   Intimate Partner Violence: Not on file   Housing Stability: Not on file       Family History:       Problem Relation Age of Onset    Diabetes Mother     High Blood Pressure Mother     Mental Illness Father     Arthritis Father     Rheum Arthritis Neg Hx     Osteoarthritis Neg Hx     Asthma Neg Hx     Breast Cancer Neg Hx     Cancer Neg Hx     Heart Failure Neg Hx     High Cholesterol Neg Hx     Hypertension Neg Hx     Migraines Neg Hx     Ovarian Cancer Neg Hx     Rashes/Skin Problems Neg Hx     Seizures Neg Hx     Stroke Neg Hx     Thyroid Disease Neg Hx        Allergy:   Allergies   Allergen Reactions    Adhesive Tape Itching and Other (See Comments)     Redness       PHYSICAL EXAM:  VITALS:  BP (!) 132/90   Pulse 94   LMP 01/03/2022 (Approximate) Comment: Pt states \"Had Period in January 2022\"    CONSTITUTIONAL:  alert, no apparent distress and morbidly obese  EYES:  sclera clear  ENT:  normocepalic, without obvious abnormality  NECK:  supple, symmetrical, trachea midline  LUNGS:  clear to auscultation  CARDIOVASCULAR:  regular rate and rhythm  ABDOMEN: Vertical incision well approximated, clean/dry/intact with minimal underlying appreciable seroma/hematoma; otherwise soft, non-distended, non-tender, voluntary guarding absent, no masses palpated and hernia absent  MUSCULOSKELETAL:  0+ pitting edema lower extremities  NEUROLOGIC:  Mental Status Exam:  Level of Alertness:   awake  Orientation:   person, place, time  SKIN:  no bruising or bleeding    DATA:    Pathology review:  11/23/22 - OR Culture:  No WBCs or organisms seen    IMPRESSION/RECOMMENDATIONS:    Salvatore Peña is recovering well following her ventral hernia repair on 11/3 and subsequent abdominal wall hematoma washout on 11/23. Drain removed last week without incident. Minimal underlying hematoma/seroma. She will follow-up in 1 month or sooner should she have any issues develop in the meantime. Leslie Do MD  General Surgery, PGY-3  12/05/22  9:14 PM  [phone]    I have seen, examined, and reviewed the patients chart. I agree with the residents assessment and have made appropriate changes.     Sharmin Javed

## 2022-12-13 NOTE — PROGRESS NOTES
PATIENT NAME: Ester Loyola     YOB: 1965     TODAY'S DATE: 10/18/2019    Reason for Visit:  Post-op check    Requesting Physician:  Dr. Johnson Devoid:              The patient is a 65 y.o. female with a PMHx as delineated below who presents for follow up from the washout of her post-op hematoma. She has no significant complaints.      Chief Complaint   Patient presents with    Post-Op Check     Wash out of abdominal wall hematoma 11/23/22       REVIEW OF SYSTEMS:  CONSTITUTIONAL:  negative  HEENT:  negative  RESPIRATORY:  negative  CARDIOVASCULAR:  negative  GASTROINTESTINAL:  negative except for abdominal pain  GENITOURINARY:  negative  HEMATOLOGIC/LYMPHATIC:  negative  NEUROLOGICAL:  negative    PMH  Past Medical History:   Diagnosis Date    Arrhythmia     pt reports intermittent arrhythmias -- Dr. Edgar Mc is cardiologist, doesn't take meds    Cataplexy     Essential hypertension 09/15/2017    High blood pressure     Migraine     Narcolepsy     takes naps to prevent    Pneumonia     june 2022    Prediabetes     Presence of upper and lower permanent dental bridges     Recurrent incisional hernia     Unspecified sleep apnea     NO CPAP    Varicose veins of lower extremities 10/09/2011       PSH  Past Surgical History:   Procedure Laterality Date    ABDOMEN SURGERY N/A 11/23/2022    WASHOUT OF ABDOMINAL WALL HEMATOMA performed by Mateo Lima MD at [address] [place] [address]  06/2009    TUBAL LIGATION      tubes tied 13 yrs ago -- [address] N/A 12/3/2019    OPEN INCISIONAL HERNIA REPAIR WITH UMBILECTOMY performed by Mateo Lima MD at [place] [address] N/A 11/3/2022    OPEN REPAIR OF RECURRENT INCISIONAL HERNIA performed by Mateo Lima MD at [address] History     Socioeconomic History    Marital status:      Spouse name: Not on file    Number of children: Not on file Years of education: Not on file    Highest education level: Not on file   Occupational History    Not on file   Tobacco Use    Smoking status: Never    Smokeless tobacco: Never   Vaping Use    Vaping Use: Never used   Substance and Sexual Activity    Alcohol use: No     Alcohol/week: 0.0 standard drinks    Drug use: Yes     Types: Opiates      Comment: Pain meds from surgery    Sexual activity: Yes     Partners: Male   Other Topics Concern    Not on file   Social History Narrative    Not on file     Social Determinants of Health     Financial Resource Strain: Low Risk     Difficulty of Paying Living Expenses: Not hard at all   Food Insecurity: No Food Insecurity    Worried About Running Out of Food in the Last Year: Never true    Ran Out of Food in the Last Year: Never true   Transportation Needs: Not on file   Physical Activity: Not on file   Stress: Not on file   Social Connections: Not on file   Intimate Partner Violence: Not on file   Housing Stability: Not on file       Allergy:   Allergies   Allergen Reactions    Adhesive Tape Itching and Other (See Comments)     Redness       PHYSICAL EXAM:  VITALS:  /87   Pulse 93   Wt 276 lb (125.2 kg)   LMP 01/03/2022 (Approximate) Comment: Pt states \"Had Period in January 2022\"  BMI 42.59 kg/m²     CONSTITUTIONAL:  alert, no apparent distress and moderately obese  EYES:  sclera clear  ENT:  normocepalic, without obvious abnormality  NECK:  supple, symmetrical, trachea midline and no carotid bruits  LUNGS:  clear to auscultation  CARDIOVASCULAR:  regular rate and rhythm and no murmur noted  ABDOMEN: Midline incision is healing well without erythema, normal bowel sounds, soft, non-distended, non-tender, voluntary guarding absent, no masses palpated, LINDA with minimal drainage and was removed.    MUSCULOSKELETAL:  0+ pitting edema lower extremities  NEUROLOGIC:  Mental Status Exam:  Level of Alertness:   awake  Orientation:   person, place, time  SKIN:  erythema over the groin and upper thighs is almost fully resolved. IMPRESSION/RECOMMENDATIONS:    The patient is s/p recurrent incisional hernia repair complicated by postoperative hematoma, s/p washout. She is doing well and I will see her back in the office in 2 weeks.      Leandra Cunningham MD

## 2023-01-06 ENCOUNTER — OFFICE VISIT (OUTPATIENT)
Dept: SURGERY | Age: 58
End: 2023-01-06

## 2023-01-06 VITALS
DIASTOLIC BLOOD PRESSURE: 93 MMHG | BODY MASS INDEX: 42.59 KG/M2 | WEIGHT: 276 LBS | HEART RATE: 75 BPM | SYSTOLIC BLOOD PRESSURE: 133 MMHG

## 2023-01-06 DIAGNOSIS — Z09 POSTOP CHECK: Primary | ICD-10-CM

## 2023-01-06 PROCEDURE — 99024 POSTOP FOLLOW-UP VISIT: CPT | Performed by: SURGERY

## 2023-01-27 NOTE — PROGRESS NOTES
PATIENT NAME: Oscar An     YOB: 1965     TODAY'S DATE: 1/27/2023    Reason for Visit:  Post-op    HISTORY OF PRESENT ILLNESS:              The patient is a 65 y.o. female with a PMHx as delineated below who presents to clinic for post-op follow-up. She has no complaints and is doing well.      REVIEW OF SYSTEMS:  CONSTITUTIONAL:  negative  HEENT:  negative  RESPIRATORY:  negative  CARDIOVASCULAR:  negative  GASTROINTESTINAL:  negative  GENITOURINARY:  negative  HEMATOLOGIC/LYMPHATIC:  negative  MUSCULOSKELETAL: negative  NEUROLOGICAL:  negative    PMH  Past Medical History:   Diagnosis Date    Arrhythmia     pt reports intermittent arrhythmias -- Dr. Alex Roe is cardiologist, doesn't take meds    Cataplexy     Essential hypertension 09/15/2017    High blood pressure     Migraine     Narcolepsy     takes naps to prevent    Pneumonia     june 2022    Prediabetes     Presence of upper and lower permanent dental bridges     Recurrent incisional hernia     Unspecified sleep apnea     NO CPAP    Varicose veins of lower extremities 10/09/2011       PSH  Past Surgical History:   Procedure Laterality Date    ABDOMEN SURGERY N/A 11/23/2022    WASHOUT OF ABDOMINAL WALL HEMATOMA performed by Yessy Conrad MD at [address] [place] [address]  06/2009    TUBAL LIGATION      tubes tied 13 yrs ago -- [address] N/A 12/3/2019    OPEN INCISIONAL HERNIA REPAIR WITH UMBILECTOMY performed by Yessy Conrad MD at [facility] N/A 11/3/2022    OPEN REPAIR OF RECURRENT INCISIONAL HERNIA performed by Yessy Conrad MD [address] History  Social History     Socioeconomic History    Marital status:      Spouse name: Not on file    Number of children: Not on file    Years of education: Not on file    Highest education level: Not on file   Occupational History    Not on file   Tobacco Use    Smoking status: Never    Smokeless tobacco: Never   Vaping Use    Vaping Use: Never used   Substance and Sexual Activity    Alcohol use: No     Alcohol/week: 0.0 standard drinks    Drug use: Yes     Types: Opiates      Comment: Pain meds from surgery    Sexual activity: Yes     Partners: Male   Other Topics Concern    Not on file   Social History Narrative    Not on file     Social Determinants of Health     Financial Resource Strain: Low Risk     Difficulty of Paying Living Expenses: Not hard at all   Food Insecurity: No Food Insecurity    Worried About Running Out of Food in the Last Year: Never true    Ran Out of Food in the Last Year: Never true   Transportation Needs: Not on file   Physical Activity: Not on file   Stress: Not on file   Social Connections: Not on file   Intimate Partner Violence: Not on file   Housing Stability: Not on file       Family History:       Problem Relation Age of Onset    Diabetes Mother     High Blood Pressure Mother     Mental Illness Father     Arthritis Father     Rheum Arthritis Neg Hx     Osteoarthritis Neg Hx     Asthma Neg Hx     Breast Cancer Neg Hx     Cancer Neg Hx     Heart Failure Neg Hx     High Cholesterol Neg Hx     Hypertension Neg Hx     Migraines Neg Hx     Ovarian Cancer Neg Hx     Rashes/Skin Problems Neg Hx     Seizures Neg Hx     Stroke Neg Hx     Thyroid Disease Neg Hx        Allergy:   Allergies   Allergen Reactions    Adhesive Tape Itching and Other (See Comments)     Redness       PHYSICAL EXAM:  VITALS:  BP (!) 133/93   Pulse 75   Wt 276 lb (125.2 kg)   LMP 01/03/2022 (Approximate) Comment: Pt states \"Had Period in January 2022\"  BMI 42.59 kg/m²     CONSTITUTIONAL:  alert, no apparent distress and morbidly obese  EYES:  sclera clear  ENT:  normocepalic, without obvious abnormality  NECK:  supple, symmetrical, trachea midline  LUNGS:  clear to auscultation  CARDIOVASCULAR:  regular rate and rhythm  ABDOMEN: Vertical incision well approximated, clean/dry/intact with no underlying appreciable seroma/hematoma; otherwise soft, non-distended, non-tender, voluntary guarding absent, no masses palpated and hernia absent  MUSCULOSKELETAL:  0+ pitting edema lower extremities  NEUROLOGIC:  Mental Status Exam:  Level of Alertness:   awake  Orientation:   person, place, time  SKIN:  no bruising or bleeding    IMPRESSION/RECOMMENDATIONS:    The patient is s/p incisional hernia repair with mesh. The patient is recovering well from surgery and I have no concerns at this time. We discussed her continued restrictions and I will see her back in the office on a PRN basis.       Osmin De Jeuss  01/27/23  4:23 PM

## 2023-02-03 NOTE — TELEPHONE ENCOUNTER
PROVIDER HAVE NOT FINISH THE PHYSICAL NOTES WILL DO TODAY Sski Pregnancy And Lactation Text: This medication is Pregnancy Category D and isn't considered safe during pregnancy. It is excreted in breast milk.

## 2023-06-19 ENCOUNTER — OFFICE VISIT (OUTPATIENT)
Dept: INTERNAL MEDICINE CLINIC | Age: 58
End: 2023-06-19
Payer: COMMERCIAL

## 2023-06-19 VITALS
TEMPERATURE: 98.6 F | BODY MASS INDEX: 40.62 KG/M2 | SYSTOLIC BLOOD PRESSURE: 114 MMHG | WEIGHT: 268 LBS | OXYGEN SATURATION: 97 % | HEART RATE: 79 BPM | HEIGHT: 68 IN | DIASTOLIC BLOOD PRESSURE: 80 MMHG

## 2023-06-19 DIAGNOSIS — G89.29 CHRONIC RIGHT SHOULDER PAIN: ICD-10-CM

## 2023-06-19 DIAGNOSIS — R73.03 PREDIABETES: Primary | ICD-10-CM

## 2023-06-19 DIAGNOSIS — M25.511 CHRONIC RIGHT SHOULDER PAIN: ICD-10-CM

## 2023-06-19 DIAGNOSIS — E66.01 CLASS 3 SEVERE OBESITY WITHOUT SERIOUS COMORBIDITY IN ADULT, UNSPECIFIED BMI, UNSPECIFIED OBESITY TYPE (HCC): ICD-10-CM

## 2023-06-19 LAB
CHP ED QC CHECK: NORMAL
GLUCOSE BLD-MCNC: 101 MG/DL
HBA1C MFR BLD: 5.5 %

## 2023-06-19 PROCEDURE — 83037 HB GLYCOSYLATED A1C HOME DEV: CPT | Performed by: NURSE PRACTITIONER

## 2023-06-19 PROCEDURE — 3079F DIAST BP 80-89 MM HG: CPT | Performed by: NURSE PRACTITIONER

## 2023-06-19 PROCEDURE — 99214 OFFICE O/P EST MOD 30 MIN: CPT | Performed by: NURSE PRACTITIONER

## 2023-06-19 PROCEDURE — 3074F SYST BP LT 130 MM HG: CPT | Performed by: NURSE PRACTITIONER

## 2023-06-19 PROCEDURE — 82962 GLUCOSE BLOOD TEST: CPT | Performed by: NURSE PRACTITIONER

## 2023-06-19 SDOH — ECONOMIC STABILITY: HOUSING INSECURITY
IN THE LAST 12 MONTHS, WAS THERE A TIME WHEN YOU DID NOT HAVE A STEADY PLACE TO SLEEP OR SLEPT IN A SHELTER (INCLUDING NOW)?: NO

## 2023-06-19 SDOH — ECONOMIC STABILITY: FOOD INSECURITY: WITHIN THE PAST 12 MONTHS, YOU WORRIED THAT YOUR FOOD WOULD RUN OUT BEFORE YOU GOT MONEY TO BUY MORE.: NEVER TRUE

## 2023-06-19 SDOH — ECONOMIC STABILITY: INCOME INSECURITY: HOW HARD IS IT FOR YOU TO PAY FOR THE VERY BASICS LIKE FOOD, HOUSING, MEDICAL CARE, AND HEATING?: NOT HARD AT ALL

## 2023-06-19 SDOH — ECONOMIC STABILITY: FOOD INSECURITY: WITHIN THE PAST 12 MONTHS, THE FOOD YOU BOUGHT JUST DIDN'T LAST AND YOU DIDN'T HAVE MONEY TO GET MORE.: NEVER TRUE

## 2023-06-19 ASSESSMENT — PATIENT HEALTH QUESTIONNAIRE - PHQ9
SUM OF ALL RESPONSES TO PHQ9 QUESTIONS 1 & 2: 0
SUM OF ALL RESPONSES TO PHQ QUESTIONS 1-9: 0
2. FEELING DOWN, DEPRESSED OR HOPELESS: 0
1. LITTLE INTEREST OR PLEASURE IN DOING THINGS: 0

## 2023-06-19 ASSESSMENT — ENCOUNTER SYMPTOMS
GASTROINTESTINAL NEGATIVE: 1
RESPIRATORY NEGATIVE: 1

## 2023-06-19 NOTE — PROGRESS NOTES
Patient: Ozzie Raymond is a [address] 82 y.o. female who presents today with the following Chief Complaint(s):  Chief Complaint   Patient presents with    Follow-up     3 MTH F/U    Other     Prediabetes      Obesity       HPI  Presents to the office for follow-up.  -Had hernia surgery last year. Had some complications and surgery and they had to go back can and make repairs. Denies any current pain. Does have a keloid forming where the scar is.   -Prediabetes and obesity. Last A1c was 5.7. Inquiring about Ozempic for weight loss. Advised of lack of insurance coverage and pricing. Also requesting for her thyroid level to be checked. Current Outpatient Medications   Medication Sig Dispense Refill    Aspirin-Acetaminophen-Caffeine (EXCEDRIN MIGRAINE PO) Take 1 tablet by mouth as needed      ascorbic acid (VITAMIN C) 500 MG tablet Take 1 tablet by mouth daily       No current facility-administered medications for this visit. Patient's past medical history, surgical history, family history, medications,  and allergies  were all reviewed and updated as appropriate today.     Patient Active Problem List   Diagnosis    Narcolepsy    Cataplexy    Varicose veins of both lower extremities    History of migraine    Essential hypertension    Plantar fasciitis of right foot    Incisional hernia, without obstruction or gangrene    Pain and swelling of right knee    Recurrent incisional hernia    Cellulitis    Abdominal wall hematoma     Past Medical History:   Diagnosis Date    Arrhythmia     pt reports intermittent arrhythmias -- Dr. Paresh Love is cardiologist, doesn't take meds    Cataplexy     Essential hypertension 09/15/2017    High blood pressure     Migraine     Narcolepsy     takes naps to prevent    Pneumonia     june 2022    Prediabetes     Presence of upper and lower permanent dental bridges     Recurrent incisional hernia     Unspecified sleep apnea     NO CPAP    Varicose veins of lower extremities

## 2023-06-19 NOTE — PATIENT INSTRUCTIONS
Increase calcium and vitamin D intake  Your A1c is 5.5    [facility] Laboratory Locations - No appointment necessary. ? indicates the location is open Saturdays in addition to Monday through Friday. Call your preferred location for test preparation, business hours and other information you need. [employer] accepts [employer]. [facility]    ? [place] [address] [address] [address] [facility]. [address] [facility], [address] [facility]    Ph: [address] [facility], [address] [facility] [address]    Ph: [phone]   ? [address] [facility].,    [facility]    Ph: [address]    Ph: [phone] ? [place]   [address]   Ph: [phone]  ? [address] [facility]. Jack Matthews [facility] [address]    Ph: [address] [place] [address] [address]   Ph: [phone]    [place]    ? [address] [place], [place] [address]    Ph: [phone]  [facility]   [address] [facility], [address] [facility]   Ph: [address] Cecily Perez. [facility]    [address]: [phone] [facility] Benito Wei [facility]    Ph: [phone] [facility].  [facility] Robyn Palma [place], [place] [address]    Ph: [phone]

## 2023-09-19 ENCOUNTER — OFFICE VISIT (OUTPATIENT)
Dept: INTERNAL MEDICINE CLINIC | Age: 58
End: 2023-09-19
Payer: COMMERCIAL

## 2023-09-19 VITALS
BODY MASS INDEX: 40.89 KG/M2 | SYSTOLIC BLOOD PRESSURE: 130 MMHG | DIASTOLIC BLOOD PRESSURE: 88 MMHG | WEIGHT: 265 LBS | HEART RATE: 82 BPM | OXYGEN SATURATION: 99 %

## 2023-09-19 DIAGNOSIS — R73.02 IGT (IMPAIRED GLUCOSE TOLERANCE): ICD-10-CM

## 2023-09-19 DIAGNOSIS — E78.2 MIXED HYPERLIPIDEMIA: ICD-10-CM

## 2023-09-19 DIAGNOSIS — E55.9 VITAMIN D DEFICIENCY: ICD-10-CM

## 2023-09-19 DIAGNOSIS — I10 PRIMARY HYPERTENSION: Primary | ICD-10-CM

## 2023-09-19 DIAGNOSIS — Z78.0 MENOPAUSE: ICD-10-CM

## 2023-09-19 DIAGNOSIS — E66.01 SEVERE OBESITY (BMI >= 40) (HCC): ICD-10-CM

## 2023-09-19 LAB
25(OH)D3 SERPL-MCNC: 38.7 NG/ML
BASOPHILS # BLD: 0 K/UL (ref 0–0.2)
BASOPHILS NFR BLD: 0.6 %
CREAT UR-MCNC: 161.6 MG/DL (ref 28–259)
DEPRECATED RDW RBC AUTO: 13.3 % (ref 12.4–15.4)
EOSINOPHIL # BLD: 0.1 K/UL (ref 0–0.6)
EOSINOPHIL NFR BLD: 2.3 %
HCT VFR BLD AUTO: 41.9 % (ref 36–48)
HGB BLD-MCNC: 14.5 G/DL (ref 12–16)
LYMPHOCYTES # BLD: 1.3 K/UL (ref 1–5.1)
LYMPHOCYTES NFR BLD: 44.8 %
MCH RBC QN AUTO: 32.2 PG (ref 26–34)
MCHC RBC AUTO-ENTMCNC: 34.6 G/DL (ref 31–36)
MCV RBC AUTO: 93.2 FL (ref 80–100)
MICROALBUMIN UR DL<=1MG/L-MCNC: <1.2 MG/DL
MICROALBUMIN/CREAT UR: NORMAL MG/G (ref 0–30)
MONOCYTES # BLD: 0.2 K/UL (ref 0–1.3)
MONOCYTES NFR BLD: 7.8 %
NEUTROPHILS # BLD: 1.3 K/UL (ref 1.7–7.7)
NEUTROPHILS NFR BLD: 44.5 %
PLATELET # BLD AUTO: 160 K/UL (ref 135–450)
PMV BLD AUTO: 10.2 FL (ref 5–10.5)
RBC # BLD AUTO: 4.5 M/UL (ref 4–5.2)
WBC # BLD AUTO: 2.9 K/UL (ref 4–11)

## 2023-09-19 PROCEDURE — 3078F DIAST BP <80 MM HG: CPT | Performed by: INTERNAL MEDICINE

## 2023-09-19 PROCEDURE — 99215 OFFICE O/P EST HI 40 MIN: CPT | Performed by: INTERNAL MEDICINE

## 2023-09-19 PROCEDURE — 3074F SYST BP LT 130 MM HG: CPT | Performed by: INTERNAL MEDICINE

## 2023-09-19 RX ORDER — SEMAGLUTIDE 0.25 MG/.5ML
0.25 INJECTION, SOLUTION SUBCUTANEOUS
Qty: 2 ML | Refills: 1 | Status: SHIPPED | OUTPATIENT
Start: 2023-09-19

## 2023-09-20 LAB
ALBUMIN SERPL-MCNC: 4.3 G/DL (ref 3.4–5)
ALBUMIN/GLOB SERPL: 1.5 {RATIO} (ref 1.1–2.2)
ALP SERPL-CCNC: 89 U/L (ref 40–129)
ALT SERPL-CCNC: 20 U/L (ref 10–40)
ANION GAP SERPL CALCULATED.3IONS-SCNC: 13 MMOL/L (ref 3–16)
AST SERPL-CCNC: 15 U/L (ref 15–37)
BILIRUB SERPL-MCNC: 0.3 MG/DL (ref 0–1)
BUN SERPL-MCNC: 16 MG/DL (ref 7–20)
CALCIUM SERPL-MCNC: 9.8 MG/DL (ref 8.3–10.6)
CHLORIDE SERPL-SCNC: 102 MMOL/L (ref 99–110)
CHOLEST SERPL-MCNC: 211 MG/DL (ref 0–199)
CO2 SERPL-SCNC: 23 MMOL/L (ref 21–32)
CREAT SERPL-MCNC: 1 MG/DL (ref 0.6–1.1)
EST. AVERAGE GLUCOSE BLD GHB EST-MCNC: 111.2 MG/DL
GFR SERPLBLD CREATININE-BSD FMLA CKD-EPI: >60 ML/MIN/{1.73_M2}
GLUCOSE SERPL-MCNC: 79 MG/DL (ref 70–99)
HBA1C MFR BLD: 5.5 %
HDLC SERPL-MCNC: 53 MG/DL (ref 40–60)
LDLC SERPL CALC-MCNC: 142 MG/DL
POTASSIUM SERPL-SCNC: 4.8 MMOL/L (ref 3.5–5.1)
PROT SERPL-MCNC: 7.1 G/DL (ref 6.4–8.2)
SODIUM SERPL-SCNC: 138 MMOL/L (ref 136–145)
TRIGL SERPL-MCNC: 81 MG/DL (ref 0–150)
TSH SERPL DL<=0.005 MIU/L-ACNC: 1.1 UIU/ML (ref 0.27–4.2)
VLDLC SERPL CALC-MCNC: 16 MG/DL

## 2023-09-21 LAB — LPA SERPL-MCNC: 72 MG/DL

## 2023-10-08 ASSESSMENT — ENCOUNTER SYMPTOMS
RESPIRATORY NEGATIVE: 1
EYES NEGATIVE: 1
GASTROINTESTINAL NEGATIVE: 1

## 2024-02-20 ENCOUNTER — OFFICE VISIT (OUTPATIENT)
Dept: INTERNAL MEDICINE CLINIC | Age: 59
End: 2024-02-20
Payer: MEDICARE

## 2024-02-20 VITALS
DIASTOLIC BLOOD PRESSURE: 86 MMHG | WEIGHT: 266 LBS | OXYGEN SATURATION: 98 % | HEART RATE: 77 BPM | SYSTOLIC BLOOD PRESSURE: 132 MMHG | BODY MASS INDEX: 41.05 KG/M2

## 2024-02-20 DIAGNOSIS — Z78.0 MENOPAUSE: ICD-10-CM

## 2024-02-20 DIAGNOSIS — G89.29 CHRONIC PAIN OF RIGHT KNEE: ICD-10-CM

## 2024-02-20 DIAGNOSIS — M25.561 CHRONIC PAIN OF RIGHT KNEE: ICD-10-CM

## 2024-02-20 DIAGNOSIS — E66.01 SEVERE OBESITY (BMI >= 40) (HCC): ICD-10-CM

## 2024-02-20 DIAGNOSIS — R73.02 IGT (IMPAIRED GLUCOSE TOLERANCE): Primary | ICD-10-CM

## 2024-02-20 DIAGNOSIS — I10 PRIMARY HYPERTENSION: ICD-10-CM

## 2024-02-20 DIAGNOSIS — E78.2 MIXED HYPERLIPIDEMIA: ICD-10-CM

## 2024-02-20 DIAGNOSIS — E55.9 VITAMIN D DEFICIENCY: ICD-10-CM

## 2024-02-20 PROCEDURE — 83036 HEMOGLOBIN GLYCOSYLATED A1C: CPT | Performed by: INTERNAL MEDICINE

## 2024-02-20 PROCEDURE — 3075F SYST BP GE 130 - 139MM HG: CPT | Performed by: INTERNAL MEDICINE

## 2024-02-20 PROCEDURE — 3079F DIAST BP 80-89 MM HG: CPT | Performed by: INTERNAL MEDICINE

## 2024-02-20 PROCEDURE — 99214 OFFICE O/P EST MOD 30 MIN: CPT | Performed by: INTERNAL MEDICINE

## 2024-02-20 PROCEDURE — 82962 GLUCOSE BLOOD TEST: CPT | Performed by: INTERNAL MEDICINE

## 2024-02-20 RX ORDER — ALPRAZOLAM 0.25 MG/1
0.25 TABLET ORAL
COMMUNITY
Start: 2024-02-14

## 2024-02-20 RX ORDER — METHYLPHENIDATE HYDROCHLORIDE EXTENDED RELEASE 20 MG/1
20 TABLET ORAL DAILY
COMMUNITY
Start: 2024-02-14

## 2024-02-20 ASSESSMENT — PATIENT HEALTH QUESTIONNAIRE - PHQ9
SUM OF ALL RESPONSES TO PHQ QUESTIONS 1-9: 0
2. FEELING DOWN, DEPRESSED OR HOPELESS: 0
SUM OF ALL RESPONSES TO PHQ QUESTIONS 1-9: 0
1. LITTLE INTEREST OR PLEASURE IN DOING THINGS: 0
SUM OF ALL RESPONSES TO PHQ9 QUESTIONS 1 & 2: 0

## 2024-02-20 NOTE — PATIENT INSTRUCTIONS
[facility] Locations - No appointment necessary.  ? indicates the location is open Saturdays in addition to Monday through Friday.   Call your preferred location for test preparation, business hours and other information you need.   [facility] accepts all insurances.  [facility]  [place]    ? Bob   [address]   [address]   [place] [address]    Ph: [phone]  [facility]   [address] [place] [address]    Ph: [phone]   ? Stu   [address]    [place] [address]    Ph: [phone]     [facility]   [address] [place] [address]    Ph: [phone] ? [place]   [address] [place] [address]   Ph: [phone]  ? [facility]   [address] [facility] [place] [address]    Ph: [phone]      [place]   [address] [place] [address]   Ph: [phone]    [place]    ? [facility]   [address] [place] [address]    Ph: [phone]  [facility]   [address] [place] [address]   Ph: [phone]  [place]   [address] [facility], [address]    PH: [phone]    [facility]   [address] [place] [address]    Ph: [phone]  [place]  [address] [place] [address]  Ph: [phone]  [facility]   [address] [place] [address]    Ph: [phone]     Glucosamine/chondroitin as directed   Vit D3 2000 iu daily.    [employer]. Web site.    Cooking for your heart and soul.      [employer].org health site.

## 2024-02-20 NOTE — PROGRESS NOTES
Patient: Elvira Peña is a 58 y.o. female who presents today with the following Chief Complaint(s):    Chief Complaint   Patient presents with    Follow-up         HPInumbness hands. Awakens when moves them. Wants to lose weight is pre diabetic trial gf GLP 1. Knees  exam ok.note MRI.     Current Outpatient Medications   Medication Sig Dispense Refill    ALPRAZolam (XANAX) 0.25 MG tablet Take 1 tablet by mouth.      methylphenidate (METADATE ER) 20 MG extended release tablet Take 1 tablet by mouth daily.      sertraline (ZOLOFT) 50 MG tablet Take 1 tablet by mouth daily      Semaglutide-Weight Management (WEGOVY) 0.25 MG/0.5ML SOAJ SC injection Inject 0.25 mg into the skin every 7 days 2 mL 1    Aspirin-Acetaminophen-Caffeine (EXCEDRIN MIGRAINE PO) Take 1 tablet by mouth as needed      ascorbic acid (VITAMIN C) 500 MG tablet Take 1 tablet by mouth daily       No current facility-administered medications for this visit.       Patient's past medical history, surgical history, family history, medications,and allergies  were all reviewed and updated as appropriate today.      Review of Systems      Physical Exam    Vitals:    02/20/24 1346   BP: 132/86   Pulse: 77   SpO2: 98%       Assessment:  No diagnosis found.    Plan:  There are no diagnoses linked to this encounter.

## 2024-02-24 ASSESSMENT — ENCOUNTER SYMPTOMS
RESPIRATORY NEGATIVE: 1
EYES NEGATIVE: 1
GASTROINTESTINAL NEGATIVE: 1

## 2024-02-24 NOTE — PROGRESS NOTES
Patient: Elvira Peña is a 58 y.o. female who presents today with the following Chief Complaint(s):    Chief Complaint   Patient presents with    Follow-up         HPIShe is here for a check up. H/O hypertension which is diet controlled. She is interested in weight loss and has tried multiple dietary interventions without success. She is prediabetic.          She also complains of right knee pain with prior MRI demonstrating tricompartmental arthritis.     Current Outpatient Medications   Medication Sig Dispense Refill    ALPRAZolam (XANAX) 0.25 MG tablet Take 1 tablet by mouth.      methylphenidate (METADATE ER) 20 MG extended release tablet Take 1 tablet by mouth daily.      sertraline (ZOLOFT) 50 MG tablet Take 1 tablet by mouth daily      Semaglutide-Weight Management (WEGOVY) 0.25 MG/0.5ML SOAJ SC injection Inject 0.25 mg into the skin every 7 days 2 mL 1    Aspirin-Acetaminophen-Caffeine (EXCEDRIN MIGRAINE PO) Take 1 tablet by mouth as needed      ascorbic acid (VITAMIN C) 500 MG tablet Take 1 tablet by mouth daily       No current facility-administered medications for this visit.       Patient's past medical history, surgical history, family history, medications,and allergies  were all reviewed and updated as appropriate today.      Review of Systems   Constitutional:         Class 3 obesity with BMI of 41.1.   HENT: Negative.     Eyes: Negative.    Respiratory: Negative.     Cardiovascular:         Hypertension, see HPI.   Gastrointestinal: Negative.    Endocrine:        Hyperlipidemia, . Lpa 72. Not on statin.     IGT, diet controlled. A1c pending.     Vitamin D def.        Genitourinary: Negative.    Musculoskeletal:         Right knee pain. See HPI.   Skin: Negative.    Neurological: Negative.    Psychiatric/Behavioral: Negative.           Physical Exam  Constitutional:       General: She is not in acute distress.     Appearance: She is well-developed.   HENT:      Head: Normocephalic and

## 2024-08-13 ENCOUNTER — OFFICE VISIT (OUTPATIENT)
Dept: INTERNAL MEDICINE CLINIC | Age: 59
End: 2024-08-13
Payer: COMMERCIAL

## 2024-08-13 VITALS
DIASTOLIC BLOOD PRESSURE: 92 MMHG | OXYGEN SATURATION: 96 % | SYSTOLIC BLOOD PRESSURE: 126 MMHG | BODY MASS INDEX: 42.13 KG/M2 | HEART RATE: 80 BPM | WEIGHT: 273 LBS

## 2024-08-13 DIAGNOSIS — Z00.00 INITIAL MEDICARE ANNUAL WELLNESS VISIT: Primary | ICD-10-CM

## 2024-08-13 DIAGNOSIS — R73.02 IGT (IMPAIRED GLUCOSE TOLERANCE): ICD-10-CM

## 2024-08-13 DIAGNOSIS — Z12.4 CERVICAL CANCER SCREENING: ICD-10-CM

## 2024-08-13 DIAGNOSIS — Z12.31 ENCOUNTER FOR SCREENING MAMMOGRAM FOR MALIGNANT NEOPLASM OF BREAST: ICD-10-CM

## 2024-08-13 DIAGNOSIS — E66.01 SEVERE OBESITY (BMI >= 40) (HCC): ICD-10-CM

## 2024-08-13 DIAGNOSIS — I10 PRIMARY HYPERTENSION: ICD-10-CM

## 2024-08-13 LAB
CHP ED QC CHECK: NORMAL
GLUCOSE BLD-MCNC: 99 MG/DL
HBA1C MFR BLD: 5.8 %

## 2024-08-13 PROCEDURE — 3017F COLORECTAL CA SCREEN DOC REV: CPT | Performed by: INTERNAL MEDICINE

## 2024-08-13 PROCEDURE — G0438 PPPS, INITIAL VISIT: HCPCS | Performed by: INTERNAL MEDICINE

## 2024-08-13 PROCEDURE — 82962 GLUCOSE BLOOD TEST: CPT | Performed by: INTERNAL MEDICINE

## 2024-08-13 PROCEDURE — 83036 HEMOGLOBIN GLYCOSYLATED A1C: CPT | Performed by: INTERNAL MEDICINE

## 2024-08-13 PROCEDURE — 3074F SYST BP LT 130 MM HG: CPT | Performed by: INTERNAL MEDICINE

## 2024-08-13 PROCEDURE — 3080F DIAST BP >= 90 MM HG: CPT | Performed by: INTERNAL MEDICINE

## 2024-08-13 SDOH — ECONOMIC STABILITY: FOOD INSECURITY: WITHIN THE PAST 12 MONTHS, YOU WORRIED THAT YOUR FOOD WOULD RUN OUT BEFORE YOU GOT MONEY TO BUY MORE.: NEVER TRUE

## 2024-08-13 SDOH — ECONOMIC STABILITY: FOOD INSECURITY: WITHIN THE PAST 12 MONTHS, THE FOOD YOU BOUGHT JUST DIDN'T LAST AND YOU DIDN'T HAVE MONEY TO GET MORE.: NEVER TRUE

## 2024-08-13 SDOH — ECONOMIC STABILITY: INCOME INSECURITY: HOW HARD IS IT FOR YOU TO PAY FOR THE VERY BASICS LIKE FOOD, HOUSING, MEDICAL CARE, AND HEATING?: NOT HARD AT ALL

## 2024-08-13 ASSESSMENT — PATIENT HEALTH QUESTIONNAIRE - PHQ9
SUM OF ALL RESPONSES TO PHQ QUESTIONS 1-9: 2
2. FEELING DOWN, DEPRESSED OR HOPELESS: SEVERAL DAYS
1. LITTLE INTEREST OR PLEASURE IN DOING THINGS: SEVERAL DAYS
SUM OF ALL RESPONSES TO PHQ QUESTIONS 1-9: 2
SUM OF ALL RESPONSES TO PHQ9 QUESTIONS 1 & 2: 2

## 2024-08-13 ASSESSMENT — LIFESTYLE VARIABLES: HOW OFTEN DO YOU HAVE A DRINK CONTAINING ALCOHOL: NEVER

## 2024-08-13 NOTE — PROGRESS NOTES
Interventions:  She does not use drugs.         General HRA Questions:  Select all that apply: (!) New or Increased Pain, New or Increased Fatigue  Interventions - Pain:  Discussion.   Suggestions.  Questions answered.   Literature provided.   Interventions Fatigue:  Discussion.   Suggestions.   Questions answered.   Literature provided.       Inactivity:  On average, how many days per week do you engage in moderate to strenuous exercise (like a brisk walk)?: 1 day (!) Abnormal  On average, how many minutes do you engage in exercise at this level?: 30 min  Interventions:  Discussion.   Suggestions.   Questions answered.   Literature provided.      Abnormal BMI (obese):  Body mass index is 42.13 kg/m². (!) Abnormal  Interventions:  Discussion.   Suggestions.   Questions answered.   Literature provided.           Vision Screen:  Do you have difficulty driving, watching TV, or doing any of your daily activities because of your eyesight?: No  Have you had an eye exam within the past year?: (!) No  Interventions:   Referral initiated.                     Objective   Vitals:    08/13/24 1215 08/13/24 1222 08/13/24 1338   BP: (!) 128/92 (!) 128/92 (!) 126/92   Site: Left Upper Arm Left Upper Arm Left Upper Arm   Position: Sitting Sitting Sitting   Cuff Size: Large Adult Small Adult Medium Adult   Pulse: 80     SpO2: 96%     Weight: 123.8 kg (273 lb)        Body mass index is 42.13 kg/m².                    Allergies   Allergen Reactions    Adhesive Tape Itching and Other (See Comments)     Redness     Prior to Visit Medications    Medication Sig Taking? Authorizing Provider   ALPRAZolam (XANAX) 0.25 MG tablet Take 1 tablet by mouth.  ProviderGordy MD   methylphenidate (METADATE ER) 20 MG extended release tablet Take 1 tablet by mouth daily.  ProviderGordy MD   sertraline (ZOLOFT) 50 MG tablet Take 1 tablet by mouth daily  ProviderGordy MD   Semaglutide-Weight Management (WEGOVY) 0.25 MG/0.5ML

## 2024-08-26 ENCOUNTER — HOSPITAL ENCOUNTER (OUTPATIENT)
Dept: MAMMOGRAPHY | Age: 59
Discharge: HOME | End: 2024-08-26

## 2024-08-26 VITALS — BODY MASS INDEX: 41.37 KG/M2 | HEIGHT: 68 IN | WEIGHT: 273 LBS

## 2024-08-26 DIAGNOSIS — Z12.31 VISIT FOR SCREENING MAMMOGRAM: ICD-10-CM

## 2024-08-26 PROCEDURE — 77063 BREAST TOMOSYNTHESIS BI: CPT

## 2024-11-12 ENCOUNTER — OFFICE VISIT (OUTPATIENT)
Dept: INTERNAL MEDICINE CLINIC | Age: 59
End: 2024-11-12

## 2024-11-12 VITALS
BODY MASS INDEX: 41.51 KG/M2 | HEART RATE: 88 BPM | DIASTOLIC BLOOD PRESSURE: 82 MMHG | SYSTOLIC BLOOD PRESSURE: 116 MMHG | WEIGHT: 269 LBS | OXYGEN SATURATION: 98 %

## 2024-11-12 DIAGNOSIS — M17.11 ARTHRITIS OF RIGHT KNEE: ICD-10-CM

## 2024-11-12 DIAGNOSIS — E66.01 SEVERE OBESITY (BMI >= 40): ICD-10-CM

## 2024-11-12 DIAGNOSIS — I10 PRIMARY HYPERTENSION: ICD-10-CM

## 2024-11-12 DIAGNOSIS — E78.2 MIXED HYPERLIPIDEMIA: ICD-10-CM

## 2024-11-12 DIAGNOSIS — R73.02 IGT (IMPAIRED GLUCOSE TOLERANCE): Primary | ICD-10-CM

## 2024-11-12 DIAGNOSIS — E55.9 VITAMIN D DEFICIENCY: ICD-10-CM

## 2024-11-12 LAB
CHP ED QC CHECK: NORMAL
GLUCOSE BLD-MCNC: 89 MG/DL
HBA1C MFR BLD: 5.4 %

## 2024-11-12 RX ORDER — ACYCLOVIR 400 MG/1
TABLET ORAL
Qty: 3 EACH | Refills: 2 | Status: SHIPPED | OUTPATIENT
Start: 2024-11-12

## 2024-11-12 NOTE — PROGRESS NOTES
Patient: Elvira Peña is a 59 y.o. female who presents today with the following Chief Complaint(s):    Chief Complaint   Patient presents with    Follow-up    Diabetes    Hypertension         HPIsalivary gland issue.     Current Outpatient Medications   Medication Sig Dispense Refill    ALPRAZolam (XANAX) 0.25 MG tablet Take 1 tablet by mouth.      methylphenidate (METADATE ER) 20 MG extended release tablet Take 1 tablet by mouth daily.      sertraline (ZOLOFT) 50 MG tablet Take 1 tablet by mouth daily      Semaglutide-Weight Management (WEGOVY) 0.25 MG/0.5ML SOAJ SC injection Inject 0.25 mg into the skin every 7 days 2 mL 1    Aspirin-Acetaminophen-Caffeine (EXCEDRIN MIGRAINE PO) Take 1 tablet by mouth as needed      ascorbic acid (VITAMIN C) 500 MG tablet Take 1 tablet by mouth daily       No current facility-administered medications for this visit.       Patient's past medical history, surgical history, family history, medications,and allergies  were all reviewed and updated as appropriate today.      Review of Systems      Physical Exam    Vitals:    11/12/24 1147   BP: (!) 132/91   Pulse: 88   SpO2: 98%       Assessment:  Encounter Diagnosis   Name Primary?    IGT (impaired glucose tolerance) Yes       Plan:  1. IGT (impaired glucose tolerance)  ***  - POCT glycosylated hemoglobin (Hb A1C)  - POCT Glucose

## 2025-03-11 ENCOUNTER — OFFICE VISIT (OUTPATIENT)
Dept: INTERNAL MEDICINE CLINIC | Age: 60
End: 2025-03-11

## 2025-03-11 VITALS
WEIGHT: 274 LBS | HEART RATE: 78 BPM | SYSTOLIC BLOOD PRESSURE: 128 MMHG | OXYGEN SATURATION: 98 % | BODY MASS INDEX: 42.28 KG/M2 | DIASTOLIC BLOOD PRESSURE: 88 MMHG

## 2025-03-11 DIAGNOSIS — E78.2 MIXED HYPERLIPIDEMIA: ICD-10-CM

## 2025-03-11 DIAGNOSIS — E66.813 CLASS 3 OBESITY: ICD-10-CM

## 2025-03-11 DIAGNOSIS — I10 PRIMARY HYPERTENSION: ICD-10-CM

## 2025-03-11 DIAGNOSIS — L98.9 SKIN LESION: ICD-10-CM

## 2025-03-11 DIAGNOSIS — R73.02 IGT (IMPAIRED GLUCOSE TOLERANCE): Primary | ICD-10-CM

## 2025-03-11 DIAGNOSIS — M17.11 ARTHRITIS OF RIGHT KNEE: ICD-10-CM

## 2025-03-11 DIAGNOSIS — Z78.0 MENOPAUSE: ICD-10-CM

## 2025-03-11 DIAGNOSIS — E55.9 VITAMIN D DEFICIENCY: ICD-10-CM

## 2025-03-11 LAB
CHP ED QC CHECK: NORMAL
GLUCOSE BLD-MCNC: 93 MG/DL
HBA1C MFR BLD: 5.7 %

## 2025-03-11 PROCEDURE — 83036 HEMOGLOBIN GLYCOSYLATED A1C: CPT | Performed by: INTERNAL MEDICINE

## 2025-03-11 PROCEDURE — G2211 COMPLEX E/M VISIT ADD ON: HCPCS | Performed by: INTERNAL MEDICINE

## 2025-03-11 PROCEDURE — 82962 GLUCOSE BLOOD TEST: CPT | Performed by: INTERNAL MEDICINE

## 2025-03-11 PROCEDURE — 3074F SYST BP LT 130 MM HG: CPT | Performed by: INTERNAL MEDICINE

## 2025-03-11 PROCEDURE — 99214 OFFICE O/P EST MOD 30 MIN: CPT | Performed by: INTERNAL MEDICINE

## 2025-03-11 PROCEDURE — 3079F DIAST BP 80-89 MM HG: CPT | Performed by: INTERNAL MEDICINE

## 2025-03-11 SDOH — ECONOMIC STABILITY: FOOD INSECURITY: WITHIN THE PAST 12 MONTHS, YOU WORRIED THAT YOUR FOOD WOULD RUN OUT BEFORE YOU GOT MONEY TO BUY MORE.: NEVER TRUE

## 2025-03-11 SDOH — ECONOMIC STABILITY: FOOD INSECURITY: WITHIN THE PAST 12 MONTHS, THE FOOD YOU BOUGHT JUST DIDN'T LAST AND YOU DIDN'T HAVE MONEY TO GET MORE.: NEVER TRUE

## 2025-03-11 ASSESSMENT — PATIENT HEALTH QUESTIONNAIRE - PHQ9
SUM OF ALL RESPONSES TO PHQ QUESTIONS 1-9: 0
SUM OF ALL RESPONSES TO PHQ QUESTIONS 1-9: 0
1. LITTLE INTEREST OR PLEASURE IN DOING THINGS: NOT AT ALL
SUM OF ALL RESPONSES TO PHQ QUESTIONS 1-9: 0
2. FEELING DOWN, DEPRESSED OR HOPELESS: NOT AT ALL
SUM OF ALL RESPONSES TO PHQ QUESTIONS 1-9: 0

## 2025-03-24 ASSESSMENT — ENCOUNTER SYMPTOMS
GASTROINTESTINAL NEGATIVE: 1
RESPIRATORY NEGATIVE: 1
EYES NEGATIVE: 1

## 2025-03-24 NOTE — PROGRESS NOTES
Patient: Elvira Peña is a 59 y.o. female who presents today with the following Chief Complaint(s):    Chief Complaint   Patient presents with    Follow-up    Diabetes         [id number] for sensors monthly.   Left upper lip pimple like lesion. Be soft and moveable. Derm f/u, beingn in appearance.   Current Outpatient Medications   Medication Sig Dispense Refill    Continuous Glucose Sensor (DEXCOM G7 SENSOR) MISC Monitor blood sugar 4 times daily and as needed. 3 each 2    ALPRAZolam (XANAX) 0.25 MG tablet Take 1 tablet by mouth.      methylphenidate (METADATE ER) 20 MG extended release tablet Take 1 tablet by mouth daily.      sertraline (ZOLOFT) 50 MG tablet Take 1 tablet by mouth daily      Semaglutide-Weight Management (WEGOVY) 0.25 MG/0.5ML SOAJ SC injection Inject 0.25 mg into the skin every 7 days 2 mL 1    Aspirin-Acetaminophen-Caffeine (EXCEDRIN MIGRAINE PO) Take 1 tablet by mouth as needed      ascorbic acid (VITAMIN C) 500 MG tablet Take 1 tablet by mouth daily       No current facility-administered medications for this visit.       Patient's past medical history, surgical history, family history, medications,and allergies  were all reviewed and updated as appropriate today.      Review of Systems      Physical Exam    Vitals:    03/11/25 1518   BP: 128/88   Pulse: 78   SpO2: 98%       Assessment:  Encounter Diagnosis   Name Primary?    IGT (impaired glucose tolerance) Yes       Plan:  1. IGT (impaired glucose tolerance)  ***  - POCT Glucose  - POCT glycosylated hemoglobin (Hb A1C)    
Negative.    Endocrine:        Hyperlipidemia, prior . Lpa 72. Not on statin.     IGT, diet controlled. A1c 5.7.     Vitamin D def. Diet and supplement treatment.        Genitourinary: Negative.    Musculoskeletal:         Right knee pain. See HPI.   Skin: Negative.    Neurological: Negative.    Psychiatric/Behavioral: Negative.           Physical Exam  Constitutional:       General: She is not in acute distress.     Appearance: She is well-developed. She is obese.   HENT:      Head: Normocephalic and atraumatic.      Right Ear: External ear normal.      Left Ear: External ear normal.      Nose: Nose normal.   Eyes:      General: No scleral icterus.     Conjunctiva/sclera: Conjunctivae normal.      Pupils: Pupils are equal, round, and reactive to light.   Neck:      Thyroid: No thyromegaly.   Cardiovascular:      Rate and Rhythm: Normal rate and regular rhythm.      Heart sounds: Normal heart sounds.   Pulmonary:      Effort: Pulmonary effort is normal.      Breath sounds: Normal breath sounds.   Abdominal:      General: Bowel sounds are normal.      Palpations: Abdomen is soft. There is no mass.   Musculoskeletal:      Cervical back: Normal range of motion and neck supple.      Comments: Right knee, crepitant, slight decreased extension.   Lymphadenopathy:      Cervical: No cervical adenopathy.   Skin:     General: Skin is warm and dry.   Neurological:      Mental Status: She is alert and oriented to person, place, and time.      Deep Tendon Reflexes: Reflexes are normal and symmetric.   Psychiatric:         Behavior: Behavior normal.         Thought Content: Thought content normal.         Judgment: Judgment normal.         Vitals:    03/11/25 1518   BP: 128/88   Pulse: 78   SpO2: 98%       Assessment:  Encounter Diagnoses   Name Primary?    Primary hypertension Yes    Mixed hyperlipidemia     Vitamin D deficiency     Menopause     Severe obesity (BMI >= 40) (formerly [facility])     IGT (impaired glucose tolerance)

## 2025-08-05 ENCOUNTER — OFFICE VISIT (OUTPATIENT)
Dept: INTERNAL MEDICINE CLINIC | Age: 60
End: 2025-08-05

## 2025-08-05 VITALS
SYSTOLIC BLOOD PRESSURE: 108 MMHG | BODY MASS INDEX: 41.82 KG/M2 | HEART RATE: 79 BPM | DIASTOLIC BLOOD PRESSURE: 82 MMHG | OXYGEN SATURATION: 80 % | WEIGHT: 271 LBS

## 2025-08-05 DIAGNOSIS — I10 PRIMARY HYPERTENSION: ICD-10-CM

## 2025-08-05 DIAGNOSIS — E55.9 VITAMIN D DEFICIENCY: ICD-10-CM

## 2025-08-05 DIAGNOSIS — R73.02 IGT (IMPAIRED GLUCOSE TOLERANCE): Primary | ICD-10-CM

## 2025-08-05 DIAGNOSIS — E66.01 SEVERE OBESITY (BMI >= 40) (HCC): ICD-10-CM

## 2025-08-05 DIAGNOSIS — M17.11 ARTHRITIS OF RIGHT KNEE: ICD-10-CM

## 2025-08-05 DIAGNOSIS — E78.2 MIXED HYPERLIPIDEMIA: ICD-10-CM

## 2025-08-05 DIAGNOSIS — Z78.0 MENOPAUSE: ICD-10-CM

## 2025-08-05 LAB
CHP ED QC CHECK: NORMAL
GLUCOSE BLD-MCNC: 95 MG/DL
HBA1C MFR BLD: 5.6 %

## 2025-08-05 PROCEDURE — 3074F SYST BP LT 130 MM HG: CPT | Performed by: INTERNAL MEDICINE

## 2025-08-05 PROCEDURE — 99214 OFFICE O/P EST MOD 30 MIN: CPT | Performed by: INTERNAL MEDICINE

## 2025-08-05 PROCEDURE — 3079F DIAST BP 80-89 MM HG: CPT | Performed by: INTERNAL MEDICINE

## 2025-08-05 PROCEDURE — 83036 HEMOGLOBIN GLYCOSYLATED A1C: CPT | Performed by: INTERNAL MEDICINE

## 2025-08-05 PROCEDURE — 82962 GLUCOSE BLOOD TEST: CPT | Performed by: INTERNAL MEDICINE

## 2025-08-05 PROCEDURE — G2211 COMPLEX E/M VISIT ADD ON: HCPCS | Performed by: INTERNAL MEDICINE

## 2025-08-05 RX ORDER — ACYCLOVIR 400 MG/1
TABLET ORAL
Qty: 3 EACH | Refills: 1 | Status: SHIPPED | OUTPATIENT
Start: 2025-08-05

## 2025-08-12 ASSESSMENT — ENCOUNTER SYMPTOMS
GASTROINTESTINAL NEGATIVE: 1
EYES NEGATIVE: 1
RESPIRATORY NEGATIVE: 1

## 2025-08-22 DIAGNOSIS — E78.2 MIXED HYPERLIPIDEMIA: ICD-10-CM

## 2025-08-22 DIAGNOSIS — I10 PRIMARY HYPERTENSION: ICD-10-CM

## 2025-08-22 LAB
ALBUMIN SERPL-MCNC: 4.1 G/DL (ref 3.4–5)
ALBUMIN/GLOB SERPL: 1.6 {RATIO} (ref 1.1–2.2)
ALP SERPL-CCNC: 81 U/L (ref 40–129)
ALT SERPL-CCNC: 27 U/L (ref 10–40)
ANION GAP SERPL CALCULATED.3IONS-SCNC: 8 MMOL/L (ref 3–16)
AST SERPL-CCNC: 18 U/L (ref 15–37)
BASOPHILS # BLD: 0 K/UL (ref 0–0.2)
BASOPHILS NFR BLD: 1.1 %
BILIRUB SERPL-MCNC: 0.4 MG/DL (ref 0–1)
BUN SERPL-MCNC: 13 MG/DL (ref 7–20)
CALCIUM SERPL-MCNC: 10.2 MG/DL (ref 8.3–10.6)
CHLORIDE SERPL-SCNC: 105 MMOL/L (ref 99–110)
CHOLEST SERPL-MCNC: 218 MG/DL (ref 0–199)
CO2 SERPL-SCNC: 28 MMOL/L (ref 21–32)
CREAT SERPL-MCNC: 0.7 MG/DL (ref 0.6–1.1)
CREAT UR-MCNC: 237 MG/DL (ref 28–259)
DEPRECATED RDW RBC AUTO: 13.1 % (ref 12.4–15.4)
EOSINOPHIL # BLD: 0.1 K/UL (ref 0–0.6)
EOSINOPHIL NFR BLD: 3.6 %
GFR SERPLBLD CREATININE-BSD FMLA CKD-EPI: >90 ML/MIN/{1.73_M2}
GLUCOSE SERPL-MCNC: 111 MG/DL (ref 70–99)
HCT VFR BLD AUTO: 42.1 % (ref 36–48)
HDLC SERPL-MCNC: 55 MG/DL (ref 40–60)
HGB BLD-MCNC: 14.6 G/DL (ref 12–16)
LDLC SERPL CALC-MCNC: 150 MG/DL
LYMPHOCYTES # BLD: 1.1 K/UL (ref 1–5.1)
LYMPHOCYTES NFR BLD: 47 %
MCH RBC QN AUTO: 31.2 PG (ref 26–34)
MCHC RBC AUTO-ENTMCNC: 34.6 G/DL (ref 31–36)
MCV RBC AUTO: 90.2 FL (ref 80–100)
MICROALBUMIN UR DL<=1MG/L-MCNC: <1.2 MG/DL
MICROALBUMIN/CREAT UR: NORMAL MG/G (ref 0–30)
MONOCYTES # BLD: 0.1 K/UL (ref 0–1.3)
MONOCYTES NFR BLD: 6 %
NEUTROPHILS # BLD: 1 K/UL (ref 1.7–7.7)
NEUTROPHILS NFR BLD: 42.3 %
PLATELET # BLD AUTO: 163 K/UL (ref 135–450)
PMV BLD AUTO: 9.8 FL (ref 5–10.5)
POTASSIUM SERPL-SCNC: 4.1 MMOL/L (ref 3.5–5.1)
PROT SERPL-MCNC: 6.7 G/DL (ref 6.4–8.2)
RBC # BLD AUTO: 4.67 M/UL (ref 4–5.2)
SODIUM SERPL-SCNC: 141 MMOL/L (ref 136–145)
TRIGL SERPL-MCNC: 63 MG/DL (ref 0–150)
TSH SERPL DL<=0.005 MIU/L-ACNC: 1.03 UIU/ML (ref 0.27–4.2)
VLDLC SERPL CALC-MCNC: 13 MG/DL
WBC # BLD AUTO: 2.4 K/UL (ref 4–11)

## (undated) DEVICE — SURGICAL SET UP - SURE SET: Brand: MEDLINE INDUSTRIES, INC.

## (undated) DEVICE — SOLUTION IV 1000ML 0.9% SOD CHL

## (undated) DEVICE — SUTURE ABSORBABLE BRAIDED 2-0 CT-1 27 IN UD VICRYL J259H

## (undated) DEVICE — SPONGE GZ W4XL8IN COT WVN 12 PLY

## (undated) DEVICE — SYSTEM SKIN CLSR 22CM DERMBND PRINEO

## (undated) DEVICE — SUTURE VCRL SZ 3-0 L18IN ABSRB UD L26MM SH 1/2 CIR J864D

## (undated) DEVICE — BLANKET WRM W29.9XL79.1IN UP BODY FORC AIR MISTRAL-AIR

## (undated) DEVICE — SUTURE VCRL SZ 0 L27IN ABSRB UD L26MM CT-2 1/2 CIR J270H

## (undated) DEVICE — SUTURE PDS II SZ 0 L27IN ABSRB VLT L26MM CT-2 1/2 CIR Z334H

## (undated) DEVICE — SUTURE PERMA-HAND SZ 2-0 L30IN NONABSORBABLE BLK L30MM FSL 679H

## (undated) DEVICE — TOWEL,STOP FLAG GOLD N-W: Brand: MEDLINE

## (undated) DEVICE — SUTURE PERMAHAND SZ 3-0 L30IN NONABSORBABLE BLK SILK BRAID A304H

## (undated) DEVICE — RESERVOIR,SUCTION,100CC,SILICONE: Brand: MEDLINE

## (undated) DEVICE — SUTURE VCRL SZ 0 L27IN ABSRB UD L36MM CT-1 1/2 CIR J260H

## (undated) DEVICE — BLADE ES ELASTOMERIC COAT INSUL DURABLE BEND UPTO 90DEG

## (undated) DEVICE — PLATE ES AD W 9FT CRD 2

## (undated) DEVICE — GLOVE SURG SZ 7 L12IN FNGR THK75MIL WHT LTX POLYMER BEAD

## (undated) DEVICE — SPONGE,DRAIN,NONWVN,4"X4",6PLY,STRL,LF: Brand: MEDLINE

## (undated) DEVICE — APPLICATOR PREP 26ML 0.7% IOD POVACRYLEX 74% ISO ALC ST

## (undated) DEVICE — DRESSING FOAM DISK DIA1IN H 7MM HYDRPHLC CHG IMPREG IN SL

## (undated) DEVICE — COVER,LIGHT HANDLE,FLX,1/PK: Brand: MEDLINE INDUSTRIES, INC.

## (undated) DEVICE — BINDER ABD UNIV H9IN WAIST 45-62IN E SFT COT PREM 3 PNL

## (undated) DEVICE — SUTURE PERMAHAND SZ 2-0 L30IN NONABSORBABLE BLK SILK W/O A305H

## (undated) DEVICE — STANDARD HYPODERMIC NEEDLE,POLYPROPYLENE HUB: Brand: MONOJECT

## (undated) DEVICE — SUTURE PROL SZ 2-0 L30IN NONABSORBABLE BLU L26MM CT-2 1/2 8411H

## (undated) DEVICE — ELECTROSURGICAL PENCIL ROCKER SWITCH NON COATED BLADE ELECTRODE 10 FT (3 M) CORD HOLSTER: Brand: MEGADYNE

## (undated) DEVICE — SURE SET-DOUBLE BASIN-LF: Brand: MEDLINE INDUSTRIES, INC.

## (undated) DEVICE — INTENDED FOR TISSUE SEPARATION, AND OTHER PROCEDURES THAT REQUIRE A SHARP SURGICAL BLADE TO PUNCTURE OR CUT.: Brand: BARD-PARKER ® CARBON RIB-BACK BLADES

## (undated) DEVICE — TOTAL TRAY, 16FR 10ML SIL FOLEY, URN: Brand: MEDLINE

## (undated) DEVICE — 3M™ IOBAN™ 2 ANTIMICROBIAL INCISE DRAPE 6648EZ: Brand: IOBAN™ 2

## (undated) DEVICE — SUTURE PERMAHAND SZ 2-0 L18IN NONABSORBABLE BLK L26MM SH C012D

## (undated) DEVICE — SHEET, T, LAPAROTOMY, STERILE: Brand: MEDLINE

## (undated) DEVICE — SUTURE VCRL SZ 3-0 L27IN ABSRB UD L26MM SH 1/2 CIR J416H

## (undated) DEVICE — SUTURE VCRL SZ 3-0 L18IN ABSRB VLT L22MM SH-1 1/2 CIR J772D

## (undated) DEVICE — SUTURE PDS II SZ 0 L60IN ABSRB VLT L48MM CTX 1/2 CIR Z990G

## (undated) DEVICE — Z DUPLICATE USE 2423173 SEALANT FIBRIN 10 CC VISTASEAL

## (undated) DEVICE — 3M™ TEGADERM™ TRANSPARENT FILM DRESSING FRAME STYLE, 1626W, 4 IN X 4-3/4 IN (10 CM X 12 CM), 50/CT 4CT/CASE: Brand: 3M™ TEGADERM™

## (undated) DEVICE — PAD,ABDOMINAL,5"X9",ST,LF,25/BX: Brand: MEDLINE INDUSTRIES, INC.

## (undated) DEVICE — CLIP INT L ORNG TI TRNSVRS GRV CHEVRON SHP W/ PRECIS TIP TO

## (undated) DEVICE — YANKAUER,OPEN TIP,W/O VENT,STERILE: Brand: MEDLINE INDUSTRIES, INC.

## (undated) DEVICE — SYSTEM SKIN CLOSURE 42CM DERMABOND PRINEO

## (undated) DEVICE — STAPLER SKIN H3.9MM WIRE DIA0.58MM CRWN 6.9MM 35 STPL ROT

## (undated) DEVICE — JEWISH HOSPITAL TURNOVER KIT: Brand: MEDLINE INDUSTRIES, INC.

## (undated) DEVICE — SUTURE ABSRB BRAID COAT VLT SH 3-0 27IN VCRL J311H

## (undated) DEVICE — DRAIN SURG 19FR 100% SIL RADPQ RND CHN FULL FLUT

## (undated) DEVICE — CLIP LIG M BLU TI HRT SHP WIRE HORZ 600 PER BX

## (undated) DEVICE — GENERAL: Brand: MEDLINE INDUSTRIES, INC.

## (undated) DEVICE — SUTURE PDS II SZ 0 L27IN ABSRB VLT L36MM CT-1 1/2 CIR Z340H

## (undated) DEVICE — SYRINGE IRRIG 60ML SFT PLIABLE BLB EZ TO GRP 1 HND USE W/

## (undated) DEVICE — SUTURE PERMAHAND SZ 3-0 L18IN NONABSORBABLE BLK L26MM SH C013D

## (undated) DEVICE — COVER LT HNDL BLU PLAS

## (undated) DEVICE — ANTI-REFLUX VALVE: Brand: SALEM SUMP

## (undated) DEVICE — DISSECTOR LAP DIA5MM BLNT TIP ENDOPATH

## (undated) DEVICE — CLEANER,CAUTERY TIP,2X2",STERILE: Brand: MEDLINE